# Patient Record
Sex: MALE | Race: WHITE | Employment: FULL TIME | URBAN - NONMETROPOLITAN AREA
[De-identification: names, ages, dates, MRNs, and addresses within clinical notes are randomized per-mention and may not be internally consistent; named-entity substitution may affect disease eponyms.]

---

## 2017-02-07 PROBLEM — M25.561 CHRONIC PAIN OF RIGHT KNEE: Status: ACTIVE | Noted: 2017-02-07

## 2017-02-07 PROBLEM — G89.29 CHRONIC PAIN OF RIGHT KNEE: Status: ACTIVE | Noted: 2017-02-07

## 2019-01-11 LAB
ALBUMIN SERPL-MCNC: NORMAL G/DL
ALP BLD-CCNC: NORMAL U/L
ALT SERPL-CCNC: NORMAL U/L
ANION GAP SERPL CALCULATED.3IONS-SCNC: NORMAL MMOL/L
AST SERPL-CCNC: NORMAL U/L
BILIRUB SERPL-MCNC: NORMAL MG/DL
BUN BLDV-MCNC: NORMAL MG/DL
CALCIUM SERPL-MCNC: NORMAL MG/DL
CHLORIDE BLD-SCNC: NORMAL MMOL/L
CHOLESTEROL, TOTAL: 258 MG/DL
CHOLESTEROL/HDL RATIO: 5
CO2: NORMAL
CREAT SERPL-MCNC: NORMAL MG/DL
GFR CALCULATED: NORMAL
GLUCOSE BLD-MCNC: NORMAL MG/DL
HDLC SERPL-MCNC: 52 MG/DL (ref 35–70)
LDL CHOLESTEROL CALCULATED: 180 MG/DL (ref 0–160)
POTASSIUM SERPL-SCNC: NORMAL MMOL/L
SODIUM BLD-SCNC: NORMAL MMOL/L
TOTAL PROTEIN: NORMAL
TRIGL SERPL-MCNC: 125 MG/DL
TSH SERPL DL<=0.05 MIU/L-ACNC: NORMAL M[IU]/L
VLDLC SERPL CALC-MCNC: ABNORMAL MG/DL

## 2019-03-19 ENCOUNTER — OFFICE VISIT (OUTPATIENT)
Dept: ORTHOPEDIC SURGERY | Age: 69
End: 2019-03-19
Payer: COMMERCIAL

## 2019-03-19 VITALS
HEART RATE: 73 BPM | DIASTOLIC BLOOD PRESSURE: 89 MMHG | HEIGHT: 65 IN | SYSTOLIC BLOOD PRESSURE: 155 MMHG | WEIGHT: 156 LBS | BODY MASS INDEX: 25.99 KG/M2

## 2019-03-19 DIAGNOSIS — G89.29 CHRONIC PAIN OF RIGHT KNEE: Primary | ICD-10-CM

## 2019-03-19 DIAGNOSIS — M25.561 CHRONIC PAIN OF RIGHT KNEE: Primary | ICD-10-CM

## 2019-03-19 PROCEDURE — 99213 OFFICE O/P EST LOW 20 MIN: CPT | Performed by: ORTHOPAEDIC SURGERY

## 2019-03-19 RX ORDER — IBUPROFEN 800 MG/1
TABLET ORAL
Refills: 0 | COMMUNITY
Start: 2019-02-28 | End: 2021-10-11

## 2019-03-19 RX ORDER — AMOXICILLIN 500 MG/1
CAPSULE ORAL
Refills: 0 | COMMUNITY
Start: 2019-01-02 | End: 2019-06-24 | Stop reason: ALTCHOICE

## 2019-04-23 ENCOUNTER — OFFICE VISIT (OUTPATIENT)
Dept: CARDIOLOGY CLINIC | Age: 69
End: 2019-04-23
Payer: COMMERCIAL

## 2019-04-23 VITALS
SYSTOLIC BLOOD PRESSURE: 128 MMHG | DIASTOLIC BLOOD PRESSURE: 80 MMHG | HEART RATE: 56 BPM | BODY MASS INDEX: 26.31 KG/M2 | HEIGHT: 65 IN | RESPIRATION RATE: 16 BRPM | WEIGHT: 157.9 LBS

## 2019-04-23 DIAGNOSIS — R00.2 PALPITATIONS: ICD-10-CM

## 2019-04-23 DIAGNOSIS — I34.1 MVP (MITRAL VALVE PROLAPSE): Primary | ICD-10-CM

## 2019-04-23 DIAGNOSIS — R42 DIZZINESS: ICD-10-CM

## 2019-04-23 PROCEDURE — 99214 OFFICE O/P EST MOD 30 MIN: CPT | Performed by: INTERNAL MEDICINE

## 2019-04-23 PROCEDURE — 93000 ELECTROCARDIOGRAM COMPLETE: CPT | Performed by: INTERNAL MEDICINE

## 2019-04-23 NOTE — PROGRESS NOTES
OFFICE FOLLOW-UP    Name: Michael Bauer     Age: 80 y.o. Date of Service: 4/23/2019    Chief Complaint: Follow-up for Mitral valve prolapse, Mitral regurgitation, S/P MV repair    Referring Physician: Dr. Tania Alatorre    Interim History:   Still with intermittent dizziness/vertigo. Otherwise with no new cardiac complaints since last office visit. Maintaining sinus rhythm. He has been off metoprolol for > 3 years due to prior history of hypotension. No chest pain or MOSHER -- he walks 2-3 miles/day with no difficulties.     Review of Systems:   Cardiac: As per HPI  General: No fever, chills  Pulmonary: As per HPI  HEENT: No visual disturbances, difficult swallowing  GI: No nausea, vomiting, abdominal pain  Endocrine: No thyroid disease or diabetes  Musculoskeletal: HAWTHORNE x 4, no arthritis  Skin: Intact, no rashes  Neuro/Psych: No headache or seizures    Past Medical History:  Past Medical History:   Diagnosis Date    Acid reflux     GERD (gastroesophageal reflux disease)        Past Surgical History:  Past Surgical History:   Procedure Laterality Date    COLONOSCOPY      MITRAL VALVE REPLACEMENT  9/17/14    RECTAL SURGERY      TUMOR REMOVAL      lipoma on chest       Family History:  Family History   Problem Relation Age of Onset    Heart Disease Mother     Heart Disease Father     Other Brother     Other Sister        Social History:  Social History     Socioeconomic History    Marital status:      Spouse name: Not on file    Number of children: Not on file    Years of education: Not on file    Highest education level: Not on file   Occupational History    Not on file   Social Needs    Financial resource strain: Not on file    Food insecurity:     Worry: Not on file     Inability: Not on file    Transportation needs:     Medical: Not on file     Non-medical: Not on file   Tobacco Use    Smoking status: Never Smoker    Smokeless tobacco: Former User     Types: Chew    Tobacco comment: snuff user daily Substance and Sexual Activity    Alcohol use: Yes     Comment: 5-6 beers a week    Drug use: No    Sexual activity: Not on file   Lifestyle    Physical activity:     Days per week: Not on file     Minutes per session: Not on file    Stress: Not on file   Relationships    Social connections:     Talks on phone: Not on file     Gets together: Not on file     Attends Scientologist service: Not on file     Active member of club or organization: Not on file     Attends meetings of clubs or organizations: Not on file     Relationship status: Not on file    Intimate partner violence:     Fear of current or ex partner: Not on file     Emotionally abused: Not on file     Physically abused: Not on file     Forced sexual activity: Not on file   Other Topics Concern    Not on file   Social History Narrative    Not on file       Allergies:  No Known Allergies    Current Medications:  Current Outpatient Medications   Medication Sig Dispense Refill    ibuprofen (ADVIL;MOTRIN) 800 MG tablet take 1 tablet by mouth every 8 hours if needed for pain  0    amoxicillin (AMOXIL) 500 MG capsule take 1 capsule by mouth three times a day for 7 days and 4 capsul. ..  (REFER TO PRESCRIPTION NOTES). 0    meclizine (ANTIVERT) 25 MG tablet       omeprazole (PRILOSEC) 20 MG capsule Take 20 mg by mouth every other day       sennosides-docusate sodium (SENOKOT-S) 8.6-50 MG tablet Take 1 tablet by mouth as needed       Naproxen Sodium (ALEVE) 220 MG CAPS Take 2 capsules by mouth as needed for Pain.  meloxicam (MOBIC) 15 MG tablet 15 mg daily  0    aspirin 81 MG chewable tablet Take 1 tablet by mouth daily. 30 tablet 3     No current facility-administered medications for this visit.       Physical Exam:  /80   Pulse 56   Resp 16   Ht 5' 5\" (1.651 m)   Wt 157 lb 14.4 oz (71.6 kg)   BMI 26.28 kg/m²   Wt Readings from Last 3 Encounters:   04/23/19 157 lb 14.4 oz (71.6 kg)   03/19/19 156 lb (70.8 kg)   10/24/17 162 lb (73.5 kg)     Appearance: Awake, no distress  Skin: Intact, no rash  Head: Normocephalic, atraumatic  Eyes: EOMI, no conjunctival erythema  ENMT: MMM, no rhinorrhea  Neck: Supple, no carotid bruits  Lungs: CTA B/L, no wheezing or rales, no distress  Cardiac: Regular rate and rhythm, +S1S2, no murmurs  Abdomen: Soft, nontender, +bowel sounds  Extremities: Moves all extremities x 4, no lower extremity edema  Neurologic: No focal motor deficits apparent, normal mood and affect    Cardiac Tests:  Echocardiogram: 7/3/14  Normal LV size and function  EF 65%  Normal RV function  Mitral valve prolapse (posterior leaflet) with severe anterior directed mitral regurgitation (eccentric jet)  Mild AI  Stage 3 DD    VOLODYMYR: 8/19/14  Normal left ventricular systolic function. Ejection fraction is visually estimated at 65%. Normal right ventricular size and function. No evidence of a left atrial appendage thrombus. No evidence of intra-atrial shunting on bubble study. Severe prolapse of the P2 segment of the posterior mitral leaflet with severe anteriorly-directed mitral regurgitation (eccentric jet). Trace-mild aortic regurgitation. Echocardiogram: 5/7/15  Normal LV size  Normal LV function, EF 60%  Normal RV size and function  S/P mitral valve repair  Mean MV gradient 2.3 mmHg, trace MR  Mild AI  MIld TR  Normal estimated PASP    EKG: SB, rate 56, NSSTT changes    ASSESSMENT / PLAN:  1. Mitral valve prolapse (P2 segment of posterior leaflet) with severe MR --> s/p minimally invasive MV repair on 9/17/14 (normal functioning valve on 5/2015 echocardiogram)  2. History of stage 3 diastolic dysfunction on 2/9293 echocardiogram  3. History of palpitations -- historically in sinus rhythm, he has been off metoprolol for > 3 years due to prior history of hypotension (resolved)  4. Post-op RLE ischemia --> s/p R femoral artery thrombectomy and saphenous vein patch repair on 9/18/14  5.  Long-standing history of dizziness/vertigo --> work-up ongoing per PCP  6.  Normal coronaries on 8/19/14 cardiac catheterization    - 30 day CardioNet ordered today  - Continue current medications  - Serial echocardiograms  - Endocarditis prophylaxis    Michael Mclaughlin MD  [facility]

## 2019-05-02 ENCOUNTER — TELEPHONE (OUTPATIENT)
Dept: CARDIOLOGY CLINIC | Age: 69
End: 2019-05-02

## 2019-05-02 NOTE — TELEPHONE ENCOUNTER
Received a Cardionet strip noting sinus tachycardia 140 bpm on 4/27/19 12:48:27 (scanned to chart). I spoke with patient. He states he takes his dogs out for walks mid day. Denies any problems at this time. He did request to only wear the monitor for 15 days instead of 30. 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 He is afraid that the monitor will get damaged or hinder him working. Please advise.

## 2019-05-10 ENCOUNTER — OFFICE VISIT (OUTPATIENT)
Dept: PODIATRY | Age: 69
End: 2019-05-10
Payer: COMMERCIAL

## 2019-05-10 VITALS
BODY MASS INDEX: 25.86 KG/M2 | WEIGHT: 155.2 LBS | HEIGHT: 65 IN | SYSTOLIC BLOOD PRESSURE: 138 MMHG | DIASTOLIC BLOOD PRESSURE: 80 MMHG

## 2019-05-10 DIAGNOSIS — M79.671 PAIN OF RIGHT FOOT: ICD-10-CM

## 2019-05-10 DIAGNOSIS — R26.2 DIFFICULTY WALKING: ICD-10-CM

## 2019-05-10 DIAGNOSIS — M72.2 PLANTAR FASCIITIS: Primary | ICD-10-CM

## 2019-05-10 PROCEDURE — 99213 OFFICE O/P EST LOW 20 MIN: CPT | Performed by: PODIATRIST

## 2019-05-10 PROCEDURE — MISCD314: Performed by: PODIATRIST

## 2019-05-10 NOTE — PROGRESS NOTES
Patient in today for 2 week follow up evaluation of right foot and ankle pain. Patient states the pain has decreased since his last visit and only has discomfort during high activity times.  pcp Angel Cornell, 
paresthesias are noted along percussion tarsal tunnel region right foot. DERMATOLOGICAL: Edematous issues and ecchymotic skin changes are resolved right lower extremity. MUSCULOSKELETAL: Increased range of motion of the right ankle, subtalar joint, and MTPJ areas noted right lower extremity. No tenderness noted into the plantar fascial heel and arch region right lower extremity. Diagnostic Studies:     No results found. No results found. Procedures:    None    Plan Per Assessment  Lennie Nunez was seen today for foot pain and ankle pain. Diagnoses and all orders for this visit:    Plantar fasciitis    Pain of right foot    Difficulty walking      1. Evaluation and management  2. We did discuss additional treatment options with the patient in detail today. We did recommend continued stretching and strengthening exercises on a daily basis. We also did recommend OTC insoles to be utilized with his existing shoe gear to prevent reoccurrence. 3. The patient was dispensed a pair of Powerstep insoles. They are medically necessary and within the standard of care for the patient's diagnosis. Their purpose is to mechanically control the heel and arch to decrease the stress and strain associated with the patient's biomechanical fault. The patient was instructed on the proper fit and break-in period. The patient was also instructed to watch for areas of rubbing, irritation, blister formation, or any other signs of abnormal pressure. If this occurs, the patient is to contact the office immediately. 4. Patient will be followed up at a later date for continued evaluation and management if any further issues recur. Seen By:    Perlita Garcia DPM    Electronically signed by Perlita Garcia DPM on 5/10/2019 at 9:53 AM    This note was created using voice recognition software. The note was reviewed however may contain grammatical errors.

## 2019-05-28 DIAGNOSIS — R42 DIZZINESS: ICD-10-CM

## 2019-05-28 DIAGNOSIS — R00.2 PALPITATIONS: ICD-10-CM

## 2019-06-17 ENCOUNTER — OFFICE VISIT (OUTPATIENT)
Dept: PODIATRY | Age: 69
End: 2019-06-17
Payer: COMMERCIAL

## 2019-06-17 VITALS — HEIGHT: 65 IN | BODY MASS INDEX: 25.83 KG/M2 | SYSTOLIC BLOOD PRESSURE: 122 MMHG | DIASTOLIC BLOOD PRESSURE: 84 MMHG

## 2019-06-17 DIAGNOSIS — M25.572 LEFT ANKLE PAIN, UNSPECIFIED CHRONICITY: ICD-10-CM

## 2019-06-17 DIAGNOSIS — R60.0 LOCALIZED EDEMA: ICD-10-CM

## 2019-06-17 DIAGNOSIS — S93.402A SPRAIN OF LIGAMENT OF LEFT ANKLE, INITIAL ENCOUNTER: Primary | ICD-10-CM

## 2019-06-17 DIAGNOSIS — R26.2 DIFFICULTY WALKING: ICD-10-CM

## 2019-06-17 PROCEDURE — 99213 OFFICE O/P EST LOW 20 MIN: CPT | Performed by: PODIATRIST

## 2019-06-17 PROCEDURE — 29580 STRAPPING UNNA BOOT: CPT | Performed by: PODIATRIST

## 2019-06-17 NOTE — PROGRESS NOTES
Patient in today for evaluation of possible left ankle sprain. Patient states he fell and twisted it Saturday and the pain has been increasing since the injury occurred.  pcp is Aurelio Tee DO last ov 4-9-19

## 2019-06-18 NOTE — PROGRESS NOTES
19     Ranell Bending    : 1950   Sex: male    Age: 77 y.o. Patient's PCP/Provider is: Barb Khoury DO    Subjective:  Patient is seen today for evaluation regarding sprain to the left ankle which occurred over the weekend. He denies any other complaints. Patient stepped in a hole and twisted his left ankle. Patient has not tried any treatments at this time. Chief Complaint   Patient presents with    Ankle Injury     left ankle       ROS:  Const: Positives and pertinent negatives as per HPI. Musculo: Denies symptoms other than stated above. Neuro: Denies symptoms other than stated above. Skin: Denies symptoms other than stated above. Current Medications:    Current Outpatient Medications:     ibuprofen (ADVIL;MOTRIN) 800 MG tablet, take 1 tablet by mouth every 8 hours if needed for pain, Disp: , Rfl: 0    amoxicillin (AMOXIL) 500 MG capsule, take 1 capsule by mouth three times a day for 7 days and 4 capsul. ..  (REFER TO PRESCRIPTION NOTES). , Disp: , Rfl: 0    meclizine (ANTIVERT) 25 MG tablet, , Disp: , Rfl:     meloxicam (MOBIC) 15 MG tablet, 15 mg daily, Disp: , Rfl: 0    aspirin 81 MG chewable tablet, Take 1 tablet by mouth daily. , Disp: 30 tablet, Rfl: 3    omeprazole (PRILOSEC) 20 MG capsule, Take 20 mg by mouth every other day , Disp: , Rfl:     sennosides-docusate sodium (SENOKOT-S) 8.6-50 MG tablet, Take 1 tablet by mouth as needed , Disp: , Rfl:     Naproxen Sodium (ALEVE) 220 MG CAPS, Take 2 capsules by mouth as needed for Pain., Disp: , Rfl:     Allergies:  No Known Allergies    Vitals:    19 1621   BP: 122/84   Weight: Comment: patient unable to stand on scale-injury   Height: 5' 5\" (1.651 m)       Exam:  NVS intact. Localized edema noted to the lateral left ankle. No ecchymotic changes noted. No skin abrasions or signs of infection noted.           Diagnostic Studies:     Xr Ankle Left (min 3 Views)    Result Date: 2019  Imaging studies performed: 3 views left ankle evaluated. Reason: Left ankle pain Results: No acute fractures or dislocations noted. Mild degenerative changes noted to both the distal medial and lateral malleolar regions. Ankle mortise maintained left. Spurring noted to both the posterior/superior and plantar surfaces calcaneus left. Vladislav Kruger DPM Electronically signed by Vladislav Kruger DPM on 6/17/2019 at 4:54 PM         Procedures: An unna boot  compressive wrap was applied to the left lower extremity. It's purpose is to  decrease  the amount of edema present, and to allow proper healing of the soft tissues. The patient was instructed to keep the unna boot clean, dry and intact until the next follow up visit. The patient was instructed to look for signs of redness, irritation, blistering and pain. If these or any other symptoms were to develop, they were advised to contact the office immediately for reevaluation. Plan Per Assessment  Heena Hagan was seen today for ankle injury. Diagnoses and all orders for this visit:    Sprain of ligament of left ankle, initial encounter    Localized edema    Left ankle pain, unspecified chronicity  -     XR ANKLE LEFT (MIN 3 VIEWS); Future    Difficulty walking      1. Evaluation and management  2. Compression dressing applied left lower extremity as described above. 3. Patient was advised on limiting his daily activities. 4. He will be followed up in 1 week or sooner if needed. Seen By:    Vladislav Kruger DPM    Electronically signed by Vladislav Kruger DPM on 6/17/2019 at 9:12 PM    This note was created using voice recognition software. The note was reviewed however may contain grammatical errors.

## 2019-06-24 ENCOUNTER — OFFICE VISIT (OUTPATIENT)
Dept: PODIATRY | Age: 69
End: 2019-06-24
Payer: COMMERCIAL

## 2019-06-24 VITALS
SYSTOLIC BLOOD PRESSURE: 122 MMHG | WEIGHT: 158 LBS | HEIGHT: 65 IN | DIASTOLIC BLOOD PRESSURE: 72 MMHG | BODY MASS INDEX: 26.33 KG/M2

## 2019-06-24 DIAGNOSIS — R26.2 DIFFICULTY WALKING: ICD-10-CM

## 2019-06-24 DIAGNOSIS — S93.402A SPRAIN OF LIGAMENT OF LEFT ANKLE, INITIAL ENCOUNTER: Primary | ICD-10-CM

## 2019-06-24 DIAGNOSIS — R60.0 LOCALIZED EDEMA: ICD-10-CM

## 2019-06-24 PROCEDURE — L4350 ANKLE CONTROL ORTHO PRE OTS: HCPCS | Performed by: PODIATRIST

## 2019-06-24 PROCEDURE — 99213 OFFICE O/P EST LOW 20 MIN: CPT | Performed by: PODIATRIST

## 2019-06-24 NOTE — PROGRESS NOTES
Patient in today for 1 week evaluation of left ankle pain. Patient states the swelling has decreased and he can walk easier than last week but he is still experiencing discomfort.  pcp is Jean Jenkins DO  Last ov 6-6-09
active range of motion and muscle testing. Diagnostic Studies:     Xr Ankle Left (min 3 Views)    Result Date: 6/17/2019  Imaging studies performed: 3 views left ankle evaluated. Reason: Left ankle pain Results: No acute fractures or dislocations noted. Mild degenerative changes noted to both the distal medial and lateral malleolar regions. Ankle mortise maintained left. Spurring noted to both the posterior/superior and plantar surfaces calcaneus left. Giacomo Devries DPM Electronically signed by Giacomo Devries DPM on 6/17/2019 at 4:54 PM         Procedures:    None    Plan Per Assessment  Altagracia Hurley was seen today for follow-up. Diagnoses and all orders for this visit:    Sprain of ligament of left ankle, initial encounter    Localized edema    Difficulty walking      1. Evaluation and management  2. Discussed continued treatment options with the patient in detail today. Did recommend the use of an ankle support to prevent further reoccurrence of symptoms. The patient was dispensed an ankle brace. It is medically necessary and within the standard of care for the patient's diagnosis. Its purpose is to stabilize the ankle; thus allowing the inflammation and pain to subside. It will also allow the patient to remain ambulatory but at the same time controlling the motion at the ankle subtalar joints. The patient was instructed on its proper application and use. The patient was was also instructed to watch for areas of rubbing irritation, blister formation, or any other signs of abnormal pressure. If this occurs, the patient is to contact the office immediately. The ABN was reviewed and signed by the patient prior to leaving this appointment. 3. Patient was advised on appropriate shoe gear to utilize to give him added support with his daily activities. 4. Patient will be followed up in 1 month's time or sooner if needed for reevaluation.       Seen By:    Giacomo Devries

## 2019-07-24 ENCOUNTER — OFFICE VISIT (OUTPATIENT)
Dept: PODIATRY | Age: 69
End: 2019-07-24
Payer: COMMERCIAL

## 2019-07-24 VITALS
HEIGHT: 65 IN | BODY MASS INDEX: 26.66 KG/M2 | SYSTOLIC BLOOD PRESSURE: 108 MMHG | WEIGHT: 160 LBS | DIASTOLIC BLOOD PRESSURE: 70 MMHG

## 2019-07-24 DIAGNOSIS — R26.2 DIFFICULTY WALKING: ICD-10-CM

## 2019-07-24 DIAGNOSIS — S93.402D SPRAIN OF LIGAMENT OF LEFT ANKLE, SUBSEQUENT ENCOUNTER: Primary | ICD-10-CM

## 2019-07-24 PROBLEM — R60.0 LOCALIZED EDEMA: Status: RESOLVED | Noted: 2019-06-17 | Resolved: 2019-07-24

## 2019-07-24 PROCEDURE — 99213 OFFICE O/P EST LOW 20 MIN: CPT | Performed by: PODIATRIST

## 2019-07-24 NOTE — PROGRESS NOTES
Patient here for a 1 month follow up on L ankle sprain. He states his ankle is feeling better sometimes, but he is still having pain off and on. PCP Matteo MARI 01/23/19.

## 2019-08-07 RX ORDER — SILDENAFIL CITRATE 20 MG/1
20 TABLET ORAL PRN
COMMUNITY
End: 2020-06-11 | Stop reason: SDUPTHER

## 2019-08-07 RX ORDER — SENNA PLUS 8.6 MG/1
1 TABLET ORAL
COMMUNITY

## 2019-08-07 RX ORDER — SENNA PLUS 8.6 MG/1
TABLET ORAL
COMMUNITY
End: 2019-08-09

## 2019-08-07 RX ORDER — AMOXICILLIN 500 MG/1
500 CAPSULE ORAL
COMMUNITY
End: 2019-08-09

## 2019-08-09 ENCOUNTER — OFFICE VISIT (OUTPATIENT)
Dept: FAMILY MEDICINE CLINIC | Age: 69
End: 2019-08-09
Payer: COMMERCIAL

## 2019-08-09 VITALS
BODY MASS INDEX: 25.16 KG/M2 | TEMPERATURE: 97.6 F | SYSTOLIC BLOOD PRESSURE: 130 MMHG | OXYGEN SATURATION: 98 % | WEIGHT: 151 LBS | DIASTOLIC BLOOD PRESSURE: 80 MMHG | HEIGHT: 65 IN | HEART RATE: 58 BPM

## 2019-08-09 DIAGNOSIS — R63.4 WEIGHT LOSS: Primary | ICD-10-CM

## 2019-08-09 PROCEDURE — G0444 DEPRESSION SCREEN ANNUAL: HCPCS | Performed by: FAMILY MEDICINE

## 2019-08-09 PROCEDURE — 99213 OFFICE O/P EST LOW 20 MIN: CPT | Performed by: FAMILY MEDICINE

## 2019-08-09 ASSESSMENT — PATIENT HEALTH QUESTIONNAIRE - PHQ9
1. LITTLE INTEREST OR PLEASURE IN DOING THINGS: 3
4. FEELING TIRED OR HAVING LITTLE ENERGY: 2
6. FEELING BAD ABOUT YOURSELF - OR THAT YOU ARE A FAILURE OR HAVE LET YOURSELF OR YOUR FAMILY DOWN: 0
9. THOUGHTS THAT YOU WOULD BE BETTER OFF DEAD, OR OF HURTING YOURSELF: 0
SUM OF ALL RESPONSES TO PHQ QUESTIONS 1-9: 12
3. TROUBLE FALLING OR STAYING ASLEEP: 1
10. IF YOU CHECKED OFF ANY PROBLEMS, HOW DIFFICULT HAVE THESE PROBLEMS MADE IT FOR YOU TO DO YOUR WORK, TAKE CARE OF THINGS AT HOME, OR GET ALONG WITH OTHER PEOPLE: 0
8. MOVING OR SPEAKING SO SLOWLY THAT OTHER PEOPLE COULD HAVE NOTICED. OR THE OPPOSITE, BEING SO FIGETY OR RESTLESS THAT YOU HAVE BEEN MOVING AROUND A LOT MORE THAN USUAL: 3
5. POOR APPETITE OR OVEREATING: 3
7. TROUBLE CONCENTRATING ON THINGS, SUCH AS READING THE NEWSPAPER OR WATCHING TELEVISION: 0
SUM OF ALL RESPONSES TO PHQ QUESTIONS 1-9: 12
SUM OF ALL RESPONSES TO PHQ9 QUESTIONS 1 & 2: 3
2. FEELING DOWN, DEPRESSED OR HOPELESS: 0

## 2019-08-09 NOTE — PROGRESS NOTES
8/9/19    Chief Complaint   Patient presents with    Weight Loss        S: patient here with wife with history of 10 lb weight loss since last visit. Admits he has 4-5 beers at night and doesn't eat. Dentures are not right either. Family History   Problem Relation Age of Onset    Heart Disease Mother     Atrial Fibrillation Mother     Heart Disease Father     Heart Attack Father     Other Brother     Other Sister        Past Surgical History:   Procedure Laterality Date    COLONOSCOPY      MITRAL VALVE REPLACEMENT  9/17/14    RECTAL SURGERY      TUMOR REMOVAL      lipoma on chest       Social History     Tobacco Use    Smoking status: Never Smoker    Smokeless tobacco: Former User     Types: Chew    Tobacco comment: snuff user daily   Substance Use Topics    Alcohol use: Yes     Comment: 5-6 beers a week    Drug use: No       ROS:  No CP, No palpitations,   No sob, No cough,   No abd pain, No heartburn,   No headaches,   No tingling, No numbness, No weakness,   No bowel changes, No hematochezia, No melena,  No bladder changes, No hematuria  No skin rashes, No skin lesions. No vision changes, No hearing changes,   No polyuria, polydipsia, polyphagia. Stable mood. ROS otherwise negative unless as listed in HPI. Chart reviewed and updated where appropriate for PMH, Fam, and Soc Hx. Physical Exam   /80   Pulse 58   Temp 97.6 °F (36.4 °C)   Ht 5' 5\" (1.651 m)   Wt 151 lb (68.5 kg)   SpO2 98%   BMI 25.13 kg/m²   Wt Readings from Last 3 Encounters:   08/09/19 151 lb (68.5 kg)   07/24/19 160 lb (72.6 kg)   06/24/19 158 lb (71.7 kg)       Constitutional:    He is oriented to person, place, and time. He appears well-developed and well-nourished.    HENT:    Right Ear: Tympanic membrane, external ear and ear canal normal.    Left Ear: Tympanic membrane, external ear and ear canal normal.    Nose: Nose normal.    Mouth/Throat: Oropharynx is clear and moist.   Eyes:    Conjunctivae are for this visit:    Weight loss    Reviewed PMhx, diet, meds, exercise, beer!! Dc beer and eat supper, discussed supplements. Recheck one month. No follow-ups on file. Patient counseled to follow up sooner or seek more acute care if symptoms worsening. Electronically signed by Myriam Han DO on 8/9/2019    This note may have been created using dictation software.  Efforts were made to reduce grammatical or syntax errors, but some may persist.

## 2019-09-30 ENCOUNTER — OFFICE VISIT (OUTPATIENT)
Dept: FAMILY MEDICINE CLINIC | Age: 69
End: 2019-09-30
Payer: COMMERCIAL

## 2019-09-30 VITALS
BODY MASS INDEX: 24.68 KG/M2 | TEMPERATURE: 98.5 F | SYSTOLIC BLOOD PRESSURE: 116 MMHG | DIASTOLIC BLOOD PRESSURE: 76 MMHG | HEIGHT: 65 IN | HEART RATE: 60 BPM | WEIGHT: 148.13 LBS | OXYGEN SATURATION: 99 %

## 2019-09-30 DIAGNOSIS — Z00.00 ROUTINE GENERAL MEDICAL EXAMINATION AT A HEALTH CARE FACILITY: Primary | ICD-10-CM

## 2019-09-30 PROCEDURE — G0439 PPPS, SUBSEQ VISIT: HCPCS | Performed by: PHYSICIAN ASSISTANT

## 2019-09-30 ASSESSMENT — LIFESTYLE VARIABLES
HOW OFTEN DO YOU HAVE A DRINK CONTAINING ALCOHOL: 4
AUDIT-C TOTAL SCORE: 5
HOW MANY STANDARD DRINKS CONTAINING ALCOHOL DO YOU HAVE ON A TYPICAL DAY: 1
HOW OFTEN DO YOU HAVE SIX OR MORE DRINKS ON ONE OCCASION: 0

## 2019-09-30 ASSESSMENT — PATIENT HEALTH QUESTIONNAIRE - PHQ9
SUM OF ALL RESPONSES TO PHQ9 QUESTIONS 1 & 2: 0
SUM OF ALL RESPONSES TO PHQ QUESTIONS 1-9: 0
SUM OF ALL RESPONSES TO PHQ QUESTIONS 1-9: 0
2. FEELING DOWN, DEPRESSED OR HOPELESS: 0
SUM OF ALL RESPONSES TO PHQ QUESTIONS 1-9: 0
1. LITTLE INTEREST OR PLEASURE IN DOING THINGS: 0
SUM OF ALL RESPONSES TO PHQ QUESTIONS 1-9: 0

## 2019-09-30 NOTE — PROGRESS NOTES
driving, watching TV, or doing any of your daily activities because of your eyesight?: No  Have you had an eye exam within the past year?: Yes  Hearing/Vision Interventions:  · Hearing concerns:  patient declines any further evaluation/treatment for hearing issues, hHe has been evaluated by Audiology    Safety:  Safety  Do you have working smoke detectors?: Yes  Have all throw rugs been removed or fastened?: (!) No  Do you have non-slip mats or surfaces in all bathtubs/showers?: Yes  Do all of your stairways have a railing or banister?: Yes  Are your doorways, halls and stairs free of clutter?: Yes  Do you always fasten your seatbelt when you are in a car?: Yes  Safety Interventions:  · Home safety tips provided    Personalized Preventive Plan   Current Health Maintenance Status  Immunization History   Administered Date(s) Administered    Influenza Vaccine, unspecified formulation 2014    Influenza Virus Vaccine 2014, 2017, 2018    Influenza, Triv, inactivated, subunit, adjuvanted, IM (Fluad 65 yrs and older) 2018    Pneumococcal Conjugate 13-valent (Danelle Nan) 2019        Health Maintenance   Topic Date Due    Hepatitis C screen  1950    DTaP/Tdap/Td vaccine (1 - Tdap) 1970    Shingles Vaccine (1 of 2) 2001    Colon cancer screen colonoscopy  2018    Annual Wellness Visit (AWV)  2019    Flu vaccine (1) 2019    Pneumococcal 65+ years Vaccine (2 of 2 - PPSV23) 2020    Lipid screen  2024     Recommendations for Preventive Services Due: see orders and patient instructions/AVS.  . Recommended screening schedule for the next 5-10 years is provided to the patient in written form: see Patient Instructions/AVS.    There are no diagnoses linked to this encounter. Medicare Annual Wellness Visit  Name: Luis Duggan Date: 2019   MRN: [id number] Sex: Male   Age: 77 y.o.  Ethnicity: Non-/Non    :

## 2019-09-30 NOTE — PATIENT INSTRUCTIONS
decide for yourself. The person you choose is called your health care agent. Another type of advance directive is a living will. It lets you write down what kinds of treatment or life support you want or do not want. What should you think about when choosing a health care agent? Choose your health care agent carefully. This person may or may not be a family member. Talk to the person before you make your final decision. Make sure he or she is comfortable with this responsibility. It's a good idea to choose someone who:  Is at least 25years old. Knows you well and understands what makes life meaningful for you. Understands your Holiness and moral values. Will do what you want, not what he or she wants. Will be able to make difficult choices at a stressful time. Will be able to refuse or stop treatment, if that is what you would want, even if you could die. Will be firm and confident with health professionals if needed. Will ask questions to get necessary information. Lives near you or agrees to travel to you if needed. Your family may help you make medical decisions while you can still be part of that process. But it is important to choose one person to be your health care agent in case you are not able to make decisions for yourself. If you don't fill out the legal form and name a health care agent, the decisions your family can make may be limited. Who will make decisions for you if you do not have a health care agent? If you don't have a health care agent or a living will, your family members may disagree about your medical care. And then some medical professionals who may not know you as well might have to make decisions for you. In some cases, a  makes the decisions. When you name a health care agent, it is very clear who has the power to make health decisions for you. How do you name a health care agent? You name your health care agent on a legal form.  It is usually called a durable

## 2019-12-12 ENCOUNTER — OFFICE VISIT (OUTPATIENT)
Dept: FAMILY MEDICINE CLINIC | Age: 69
End: 2019-12-12
Payer: COMMERCIAL

## 2019-12-12 VITALS
WEIGHT: 152.6 LBS | OXYGEN SATURATION: 99 % | SYSTOLIC BLOOD PRESSURE: 140 MMHG | HEART RATE: 76 BPM | BODY MASS INDEX: 25.43 KG/M2 | HEIGHT: 65 IN | DIASTOLIC BLOOD PRESSURE: 86 MMHG | TEMPERATURE: 97.2 F

## 2019-12-12 DIAGNOSIS — M70.21 OLECRANON BURSITIS OF RIGHT ELBOW: ICD-10-CM

## 2019-12-12 DIAGNOSIS — R42 VERTIGO: Primary | ICD-10-CM

## 2019-12-12 PROCEDURE — 99213 OFFICE O/P EST LOW 20 MIN: CPT | Performed by: FAMILY MEDICINE

## 2019-12-12 RX ORDER — NAPROXEN 500 MG/1
500 TABLET ORAL 2 TIMES DAILY WITH MEALS
Qty: 20 TABLET | Refills: 1 | Status: SHIPPED
Start: 2019-12-12 | End: 2022-06-17 | Stop reason: SDUPTHER

## 2020-01-03 ENCOUNTER — OFFICE VISIT (OUTPATIENT)
Dept: FAMILY MEDICINE CLINIC | Age: 70
End: 2020-01-03
Payer: MEDICARE

## 2020-01-03 VITALS
TEMPERATURE: 97.8 F | DIASTOLIC BLOOD PRESSURE: 82 MMHG | HEART RATE: 59 BPM | SYSTOLIC BLOOD PRESSURE: 140 MMHG | HEIGHT: 65 IN | BODY MASS INDEX: 26.12 KG/M2 | WEIGHT: 156.8 LBS | OXYGEN SATURATION: 99 %

## 2020-01-03 PROCEDURE — G8510 SCR DEP NEG, NO PLAN REQD: HCPCS | Performed by: FAMILY MEDICINE

## 2020-01-03 PROCEDURE — 99213 OFFICE O/P EST LOW 20 MIN: CPT | Performed by: FAMILY MEDICINE

## 2020-01-03 ASSESSMENT — PATIENT HEALTH QUESTIONNAIRE - PHQ9
SUM OF ALL RESPONSES TO PHQ QUESTIONS 1-9: 0
1. LITTLE INTEREST OR PLEASURE IN DOING THINGS: 0
SUM OF ALL RESPONSES TO PHQ QUESTIONS 1-9: 0
2. FEELING DOWN, DEPRESSED OR HOPELESS: 0
SUM OF ALL RESPONSES TO PHQ9 QUESTIONS 1 & 2: 0

## 2020-01-03 NOTE — PROGRESS NOTES
Mouth/Throat: Oropharynx is clear and moist.   Eyes:    Conjunctivae are normal.    Pupils are equal, round, and reactive to light. EOMI. Neck:    Normal range of motion. No thyromegaly or nodules noted. No bruit. Cardiovascular:    Normal rate, regular rhythm and normal heart sounds. No murmur. No gallop and no friction rub. Pulmonary/Chest:    Effort normal and breath sounds normal.    No wheezes. No rales or rhonchi. Abdominal:    Soft. Bowel sounds are normal.    No distension. No tenderness. Musculoskeletal: Right olecranon bursa with effusion. No erythema. Normal range of motion. No joint swelling noted. No peripheral edema. Neurological:    He is alert and oriented to person, place, and time. Motor and sensation grossly intact. Normal Gait. Skin:    Skin is warm and dry. No rashes, lesions. Psychiatric:    He has a normal mood and affect. Normal groom and dress. Current Outpatient Medications on File Prior to Visit   Medication Sig Dispense Refill    naproxen (NAPROSYN) 500 MG tablet Take 1 tablet by mouth 2 times daily (with meals) 20 tablet 1    sildenafil (REVATIO) 20 MG tablet Take 20 mg by mouth as needed      senna (SENOKOT) 8.6 MG tablet Take 1 tablet by mouth      ibuprofen (ADVIL;MOTRIN) 800 MG tablet take 1 tablet by mouth every 8 hours if needed for pain  0    omeprazole (PRILOSEC) 20 MG capsule Take 20 mg by mouth every other day       Naproxen Sodium (ALEVE) 220 MG CAPS Take 2 capsules by mouth as needed for Pain.  meclizine (ANTIVERT) 25 MG tablet        No current facility-administered medications on file prior to visit.         Patient Active Problem List   Diagnosis Code    MVP (mitral valve prolapse) I34.1    Mitral regurgitation I34.0    S/P MVR (mitral valve repair) H32.403    Diastolic dysfunction I54.40    Lower limb ischemia I99.8    Palpitations R00.2    Chronic pain of right knee M25.561, G89.29    Plantar

## 2020-01-08 ENCOUNTER — TELEPHONE (OUTPATIENT)
Dept: FAMILY MEDICINE CLINIC | Age: 70
End: 2020-01-08

## 2020-01-08 NOTE — TELEPHONE ENCOUNTER
Patient calling in. He has been unable to reach . He is wondering if he can have the referral switched to [address] [place], [place] [address] in [place]?   Please advise, thank you

## 2020-06-11 ENCOUNTER — HOSPITAL ENCOUNTER (OUTPATIENT)
Age: 70
Discharge: HOME | End: 2020-06-13
Payer: MEDICARE

## 2020-06-11 ENCOUNTER — OFFICE VISIT (OUTPATIENT)
Dept: PRIMARY CARE CLINIC | Age: 70
End: 2020-06-11
Payer: MEDICARE

## 2020-06-11 VITALS
SYSTOLIC BLOOD PRESSURE: 120 MMHG | DIASTOLIC BLOOD PRESSURE: 80 MMHG | OXYGEN SATURATION: 98 % | WEIGHT: 170 LBS | HEIGHT: 65 IN | TEMPERATURE: 98.5 F | HEART RATE: 81 BPM | BODY MASS INDEX: 28.32 KG/M2

## 2020-06-11 PROBLEM — S93.402A SPRAIN OF LIGAMENT OF LEFT ANKLE: Status: RESOLVED | Noted: 2019-06-17 | Resolved: 2020-06-11

## 2020-06-11 LAB
ALBUMIN SERPL-MCNC: 4.4 G/DL (ref 3.5–5.2)
ALP BLD-CCNC: 64 U/L (ref 40–129)
ALT SERPL-CCNC: 19 U/L (ref 0–40)
ANION GAP SERPL CALCULATED.3IONS-SCNC: 12 MMOL/L (ref 7–16)
AST SERPL-CCNC: 23 U/L (ref 0–39)
BASOPHILS ABSOLUTE: 0.03 E9/L (ref 0–0.2)
BASOPHILS RELATIVE PERCENT: 0.6 % (ref 0–2)
BILIRUB SERPL-MCNC: 0.3 MG/DL (ref 0–1.2)
BUN BLDV-MCNC: 19 MG/DL (ref 8–23)
CALCIUM SERPL-MCNC: 9.8 MG/DL (ref 8.6–10.2)
CHLORIDE BLD-SCNC: 103 MMOL/L (ref 98–107)
CHOLESTEROL, TOTAL: 255 MG/DL (ref 0–199)
CO2: 23 MMOL/L (ref 22–29)
CREAT SERPL-MCNC: 1.3 MG/DL (ref 0.7–1.2)
CREATININE URINE: 164 MG/DL (ref 40–278)
EOSINOPHILS ABSOLUTE: 0.11 E9/L (ref 0.05–0.5)
EOSINOPHILS RELATIVE PERCENT: 2.1 % (ref 0–6)
FERRITIN: 307 NG/ML
FOLATE: >20 NG/ML (ref 4.8–24.2)
GFR AFRICAN AMERICAN: >60
GFR NON-AFRICAN AMERICAN: 55 ML/MIN/1.73
GLUCOSE BLD-MCNC: 101 MG/DL (ref 74–99)
HBA1C MFR BLD: 5.3 % (ref 4–5.6)
HCT VFR BLD CALC: 45.6 % (ref 37–54)
HDLC SERPL-MCNC: 61 MG/DL
HEMOGLOBIN: 14.5 G/DL (ref 12.5–16.5)
IMMATURE GRANULOCYTES #: 0.01 E9/L
IMMATURE GRANULOCYTES %: 0.2 % (ref 0–5)
IRON SATURATION: 42 % (ref 20–55)
IRON: 119 MCG/DL (ref 59–158)
LDL CHOLESTEROL CALCULATED: 169 MG/DL (ref 0–99)
LYMPHOCYTES ABSOLUTE: 1.12 E9/L (ref 1.5–4)
LYMPHOCYTES RELATIVE PERCENT: 21.8 % (ref 20–42)
MCH RBC QN AUTO: 30.4 PG (ref 26–35)
MCHC RBC AUTO-ENTMCNC: 31.8 % (ref 32–34.5)
MCV RBC AUTO: 95.6 FL (ref 80–99.9)
MICROALBUMIN UR-MCNC: 27 MG/L
MICROALBUMIN/CREAT UR-RTO: 16.5 (ref 0–30)
MONOCYTES ABSOLUTE: 0.44 E9/L (ref 0.1–0.95)
MONOCYTES RELATIVE PERCENT: 8.6 % (ref 2–12)
NEUTROPHILS ABSOLUTE: 3.43 E9/L (ref 1.8–7.3)
NEUTROPHILS RELATIVE PERCENT: 66.7 % (ref 43–80)
PDW BLD-RTO: 12.5 FL (ref 11.5–15)
PLATELET # BLD: 320 E9/L (ref 130–450)
PMV BLD AUTO: 10.7 FL (ref 7–12)
POTASSIUM SERPL-SCNC: 4.7 MMOL/L (ref 3.5–5)
PROSTATE SPECIFIC ANTIGEN: 1.91 NG/ML (ref 0–4)
RBC # BLD: 4.77 E12/L (ref 3.8–5.8)
SODIUM BLD-SCNC: 138 MMOL/L (ref 132–146)
T4 FREE: 0.96 NG/DL (ref 0.93–1.7)
TOTAL IRON BINDING CAPACITY: 285 MCG/DL (ref 250–450)
TOTAL PROTEIN: 7.4 G/DL (ref 6.4–8.3)
TRIGL SERPL-MCNC: 125 MG/DL (ref 0–149)
TSH SERPL DL<=0.05 MIU/L-ACNC: 2.98 UIU/ML (ref 0.27–4.2)
VITAMIN B-12: 356 PG/ML (ref 211–946)
VITAMIN D 25-HYDROXY: 19 NG/ML (ref 30–100)
VLDLC SERPL CALC-MCNC: 25 MG/DL
WBC # BLD: 5.1 E9/L (ref 4.5–11.5)

## 2020-06-11 PROCEDURE — G0103 PSA SCREENING: HCPCS

## 2020-06-11 PROCEDURE — 82746 ASSAY OF FOLIC ACID SERUM: CPT

## 2020-06-11 PROCEDURE — 84439 ASSAY OF FREE THYROXINE: CPT

## 2020-06-11 PROCEDURE — 84443 ASSAY THYROID STIM HORMONE: CPT

## 2020-06-11 PROCEDURE — 83550 IRON BINDING TEST: CPT

## 2020-06-11 PROCEDURE — 82306 VITAMIN D 25 HYDROXY: CPT

## 2020-06-11 PROCEDURE — 80061 LIPID PANEL: CPT

## 2020-06-11 PROCEDURE — 85025 COMPLETE CBC W/AUTO DIFF WBC: CPT

## 2020-06-11 PROCEDURE — 99214 OFFICE O/P EST MOD 30 MIN: CPT | Performed by: FAMILY MEDICINE

## 2020-06-11 PROCEDURE — 83540 ASSAY OF IRON: CPT

## 2020-06-11 PROCEDURE — 82570 ASSAY OF URINE CREATININE: CPT

## 2020-06-11 PROCEDURE — 83036 HEMOGLOBIN GLYCOSYLATED A1C: CPT

## 2020-06-11 PROCEDURE — 82607 VITAMIN B-12: CPT

## 2020-06-11 PROCEDURE — 82044 UR ALBUMIN SEMIQUANTITATIVE: CPT

## 2020-06-11 PROCEDURE — 80053 COMPREHEN METABOLIC PANEL: CPT

## 2020-06-11 PROCEDURE — 36415 COLL VENOUS BLD VENIPUNCTURE: CPT

## 2020-06-11 PROCEDURE — 82728 ASSAY OF FERRITIN: CPT

## 2020-06-11 RX ORDER — SILDENAFIL CITRATE 20 MG/1
20 TABLET ORAL PRN
Qty: 30 TABLET | Refills: 5 | Status: SHIPPED
Start: 2020-06-11 | End: 2020-12-11 | Stop reason: SDUPTHER

## 2020-06-11 ASSESSMENT — ENCOUNTER SYMPTOMS
DIARRHEA: 0
COUGH: 0
CONSTIPATION: 0
VOMITING: 0
BACK PAIN: 0
ABDOMINAL PAIN: 0
SHORTNESS OF BREATH: 0
NAUSEA: 0
WHEEZING: 0

## 2020-06-11 NOTE — PROGRESS NOTES
20  [place] Apgar : 1950 Sex: male  Age: 72 y.o. Chief Complaint   Patient presents with    Medication Refill     HPI:  72 y.o. male patient of Dr. Ryne Noonan presents today for 6 month(s) follow up of chronic medical conditions and FBW. Patient's chart, medical, surgical and medication history all reviewed. Fatigue: Patient complains of fatigue. Symptoms began several months ago. Sentinal symptom the patient feels fatigue began with: none. Symptoms of his fatigue have been change in appetite, decreased libido, fatigue with paradoxical insomnia, general malaise and hypersomnolence. Patient describes the following psychologic symptoms: none. Patient denies fever, significant change in weight, symptoms of arthritis, exercise intolerance, unusual rashes, cold intolerance, constipation and change in hair texture. and witnessed or suspected sleep apnea. Symptoms have gradually worsened. Severity has been moderate. Previous visits for this problem: none. ROS:  Review of Systems   Constitutional: Positive for activity change, appetite change and fatigue. Negative for chills, fever and unexpected weight change. Respiratory: Negative for cough, shortness of breath and wheezing. Cardiovascular: Negative for chest pain and palpitations. Gastrointestinal: Negative for abdominal pain, constipation, diarrhea, nausea and vomiting. Musculoskeletal: Negative for arthralgias and back pain. Skin: Negative for rash. Neurological: Negative for dizziness and headaches. Psychiatric/Behavioral: Positive for sleep disturbance. Negative for dysphoric mood. The patient is not nervous/anxious. All other systems reviewed and are negative.      Current Outpatient Medications on File Prior to Visit   Medication Sig Dispense Refill    naproxen (NAPROSYN) 500 MG tablet Take 1 tablet by mouth 2 times daily (with meals) 20 tablet 1    senna (SENOKOT) 8.6 MG tablet Take 1 tablet by mouth      ibuprofen  Other Brother         Hemachromatosis     Other Sister         MS     Social History     Socioeconomic History    Marital status:      Spouse name: Not on file    Number of children: Not on file    Years of education: Not on file    Highest education level: Not on file   Occupational History    Occupation: [employer], mows grass,etc   Social Needs    Financial resource strain: Not on file    Food insecurity     Worry: Not on file     Inability: Not on file   [employer] needs     Medical: Not on file     Non-medical: Not on file   Tobacco Use    Smoking status: Never Smoker    Smokeless tobacco: Former User     Types: Chew    Tobacco comment: snuff user daily   Substance and Sexual Activity    Alcohol use: Yes     Comment: 5-6 beers a week    Drug use: No    Sexual activity: Not on file   Lifestyle    Physical activity     Days per week: Not on file     Minutes per session: Not on file    Stress: Not on file   Relationships    Social connections     Talks on phone: Not on file     Gets together: Not on file     Attends Church service: Not on file     Active member of club or organization: Not on file     Attends meetings of clubs or organizations: Not on file     Relationship status: Not on file    Intimate partner violence     Fear of current or ex partner: Not on file     Emotionally abused: Not on file     Physically abused: Not on file     Forced sexual activity: Not on file   Other Topics Concern    Not on file   Social History Narrative    Not on file       Vitals:    06/11/20 0815   BP: 120/80   Pulse: 81   Temp: 98.5 °F (36.9 °C)   SpO2: 98%   Weight: 170 lb (77.1 kg)   Height: 5' 4.5\" (1.638 m)       Physical Exam:  Physical Exam  Vitals signs and nursing note reviewed. Constitutional:       General: He is not in acute distress. Appearance: Normal appearance. He is well-developed and normal weight. He is not ill-appearing.    HENT:      Head: Normocephalic AST 25 09/15/2014    ALT 19 09/15/2014     Uric Acid:  No results found for: Sky Devries  HgBA1c:    Lab Results   Component Value Date    LABA1C 5.3 09/15/2014     Microalbumen/Creatinine ratio:  No components found for: RUCREAT  FLP:    Lab Results   Component Value Date    TRIG 125 01/11/2019    HDL 52 01/11/2019    LDLCALC 180 01/11/2019     TSH:  No results found for: TSH  VITAMIN B12: No components found for: B12  IRON:  No results found for: IRON  FERRITIN:  No results found for: FERRITIN       Assessment and Plan:  Luis Robles was seen today for medication refill. Diagnoses and all orders for this visit:    Other fatigue  -     TSH without Reflex; Future  -     Vitamin B12 & Folate; Future  -     T4, Free; Future  Will check labs to determine patient's cause for fatigue. Dizziness  Discussed with patient and wife that symptoms may be more related to sinuses. Needs to take Zyrtec and Flonase every day. Has had ENT work up. Vitamin D deficiency  -     Vitamin D 25 Hydroxy; Future    Vasculogenic erectile dysfunction, unspecified vasculogenic erectile dysfunction type  -     sildenafil (REVATIO) 20 MG tablet; Take 1 tablet by mouth as needed (ED)    Impaired fasting blood sugar  -     Hemoglobin A1C; Future    Anemia, unspecified type  -     CBC Auto Differential; Future  -     Iron and TIBC; Future  -     Ferritin; Future    Screening for cardiovascular condition  -     CBC Auto Differential; Future  -     Comprehensive Metabolic Panel; Future  -     Lipid Panel; Future  -     Microalbumin / Creatinine Urine Ratio; Future    Screening for prostate cancer  -     Psa screening; Future    At high risk for falls  On the basis of positive falls risk screening, assessment and plan is as follows: patient declines any further evaluation/treatment for increased falls risk. Return in about 6 months (around 12/11/2020) for Recheck.       Seen By:  Corona Webb DO

## 2020-06-12 ENCOUNTER — TELEPHONE (OUTPATIENT)
Dept: PRIMARY CARE CLINIC | Age: 70
End: 2020-06-12

## 2020-06-26 ENCOUNTER — TELEPHONE (OUTPATIENT)
Dept: FAMILY MEDICINE CLINIC | Age: 70
End: 2020-06-26

## 2020-06-26 RX ORDER — ERGOCALCIFEROL 1.25 MG/1
50000 CAPSULE ORAL WEEKLY
Qty: 12 CAPSULE | Refills: 1 | Status: SHIPPED
Start: 2020-06-26 | End: 2020-12-11 | Stop reason: SDUPTHER

## 2020-06-26 NOTE — TELEPHONE ENCOUNTER
Vito's labs all look stable. His kidney function is mildly reduced, but not significantly. We will monitor this. His cholesterol is improved. His vitamin D is low at 19. I will send a once a week prescription for him to take.      Please mail them a copy of labs

## 2020-12-01 ENCOUNTER — TELEPHONE (OUTPATIENT)
Dept: FAMILY MEDICINE CLINIC | Age: 70
End: 2020-12-01

## 2020-12-01 NOTE — TELEPHONE ENCOUNTER
Nanda Berger has an appointment with you on the 11th and asking for a lab order to be put in today or tomorrow.

## 2020-12-03 DIAGNOSIS — I10 ESSENTIAL HYPERTENSION: ICD-10-CM

## 2020-12-03 DIAGNOSIS — E55.9 VITAMIN D INSUFFICIENCY: ICD-10-CM

## 2020-12-03 DIAGNOSIS — R73.01 IMPAIRED FASTING GLUCOSE: ICD-10-CM

## 2020-12-03 DIAGNOSIS — D50.9 IRON DEFICIENCY ANEMIA, UNSPECIFIED IRON DEFICIENCY ANEMIA TYPE: ICD-10-CM

## 2020-12-03 LAB
ALBUMIN SERPL-MCNC: 3.9 G/DL (ref 3.5–5.2)
ALP BLD-CCNC: 69 U/L (ref 40–129)
ALT SERPL-CCNC: 14 U/L (ref 0–40)
ANION GAP SERPL CALCULATED.3IONS-SCNC: 9 MMOL/L (ref 7–16)
AST SERPL-CCNC: 19 U/L (ref 0–39)
BASOPHILS ABSOLUTE: 0.05 E9/L (ref 0–0.2)
BASOPHILS RELATIVE PERCENT: 0.8 % (ref 0–2)
BILIRUB SERPL-MCNC: 0.4 MG/DL (ref 0–1.2)
BILIRUBIN URINE: NEGATIVE
BLOOD, URINE: NEGATIVE
BUN BLDV-MCNC: 14 MG/DL (ref 8–23)
CALCIUM SERPL-MCNC: 9.4 MG/DL (ref 8.6–10.2)
CHLORIDE BLD-SCNC: 103 MMOL/L (ref 98–107)
CHOLESTEROL, TOTAL: 243 MG/DL (ref 0–199)
CLARITY: CLEAR
CO2: 27 MMOL/L (ref 22–29)
COLOR: YELLOW
CREAT SERPL-MCNC: 1.1 MG/DL (ref 0.7–1.2)
EOSINOPHILS ABSOLUTE: 0.35 E9/L (ref 0.05–0.5)
EOSINOPHILS RELATIVE PERCENT: 5.4 % (ref 0–6)
FERRITIN: 280 NG/ML
FOLATE: 19.8 NG/ML (ref 4.8–24.2)
GFR AFRICAN AMERICAN: >60
GFR NON-AFRICAN AMERICAN: >60 ML/MIN/1.73
GLUCOSE BLD-MCNC: 84 MG/DL (ref 74–99)
GLUCOSE URINE: NEGATIVE MG/DL
HBA1C MFR BLD: 5.4 % (ref 4–5.6)
HCT VFR BLD CALC: 44.8 % (ref 37–54)
HDLC SERPL-MCNC: 51 MG/DL
HEMOGLOBIN: 14 G/DL (ref 12.5–16.5)
IMMATURE GRANULOCYTES #: 0.02 E9/L
IMMATURE GRANULOCYTES %: 0.3 % (ref 0–5)
IRON SATURATION: 49 % (ref 20–55)
IRON: 125 MCG/DL (ref 59–158)
KETONES, URINE: NEGATIVE MG/DL
LDL CHOLESTEROL CALCULATED: 154 MG/DL (ref 0–99)
LEUKOCYTE ESTERASE, URINE: NEGATIVE
LYMPHOCYTES ABSOLUTE: 1.76 E9/L (ref 1.5–4)
LYMPHOCYTES RELATIVE PERCENT: 27 % (ref 20–42)
MCH RBC QN AUTO: 29.8 PG (ref 26–35)
MCHC RBC AUTO-ENTMCNC: 31.3 % (ref 32–34.5)
MCV RBC AUTO: 95.3 FL (ref 80–99.9)
MONOCYTES ABSOLUTE: 0.72 E9/L (ref 0.1–0.95)
MONOCYTES RELATIVE PERCENT: 11.1 % (ref 2–12)
NEUTROPHILS ABSOLUTE: 3.61 E9/L (ref 1.8–7.3)
NEUTROPHILS RELATIVE PERCENT: 55.4 % (ref 43–80)
NITRITE, URINE: NEGATIVE
PDW BLD-RTO: 12.6 FL (ref 11.5–15)
PH UA: 6.5 (ref 5–9)
PLATELET # BLD: 304 E9/L (ref 130–450)
PMV BLD AUTO: 10.6 FL (ref 7–12)
POTASSIUM SERPL-SCNC: 4.2 MMOL/L (ref 3.5–5)
PROTEIN UA: NEGATIVE MG/DL
RBC # BLD: 4.7 E12/L (ref 3.8–5.8)
SODIUM BLD-SCNC: 139 MMOL/L (ref 132–146)
SPECIFIC GRAVITY UA: 1.02 (ref 1–1.03)
TOTAL IRON BINDING CAPACITY: 256 MCG/DL (ref 250–450)
TOTAL PROTEIN: 7.1 G/DL (ref 6.4–8.3)
TRIGL SERPL-MCNC: 191 MG/DL (ref 0–149)
TSH SERPL DL<=0.05 MIU/L-ACNC: 3.12 UIU/ML (ref 0.27–4.2)
UROBILINOGEN, URINE: 0.2 E.U./DL
VITAMIN B-12: 329 PG/ML (ref 211–946)
VITAMIN D 25-HYDROXY: 35 NG/ML (ref 30–100)
VLDLC SERPL CALC-MCNC: 38 MG/DL
WBC # BLD: 6.5 E9/L (ref 4.5–11.5)

## 2020-12-11 ENCOUNTER — OFFICE VISIT (OUTPATIENT)
Dept: FAMILY MEDICINE CLINIC | Age: 70
End: 2020-12-11
Payer: MEDICARE

## 2020-12-11 VITALS
TEMPERATURE: 97.8 F | SYSTOLIC BLOOD PRESSURE: 138 MMHG | WEIGHT: 167 LBS | HEIGHT: 64 IN | BODY MASS INDEX: 28.51 KG/M2 | DIASTOLIC BLOOD PRESSURE: 86 MMHG | HEART RATE: 64 BPM | OXYGEN SATURATION: 98 %

## 2020-12-11 PROCEDURE — 90732 PPSV23 VACC 2 YRS+ SUBQ/IM: CPT | Performed by: FAMILY MEDICINE

## 2020-12-11 PROCEDURE — G0439 PPPS, SUBSEQ VISIT: HCPCS | Performed by: FAMILY MEDICINE

## 2020-12-11 PROCEDURE — G0009 ADMIN PNEUMOCOCCAL VACCINE: HCPCS | Performed by: FAMILY MEDICINE

## 2020-12-11 RX ORDER — ERGOCALCIFEROL 1.25 MG/1
50000 CAPSULE ORAL WEEKLY
Qty: 12 CAPSULE | Refills: 1 | Status: SHIPPED
Start: 2020-12-11 | End: 2021-06-14

## 2020-12-11 RX ORDER — SILDENAFIL CITRATE 20 MG/1
20 TABLET ORAL PRN
Qty: 30 TABLET | Refills: 5 | Status: SHIPPED
Start: 2020-12-11 | End: 2021-06-11 | Stop reason: SDUPTHER

## 2020-12-11 RX ORDER — ATORVASTATIN CALCIUM 20 MG/1
20 TABLET, FILM COATED ORAL EVERY EVENING
Qty: 90 TABLET | Refills: 1 | Status: SHIPPED
Start: 2020-12-11 | End: 2021-08-02

## 2020-12-11 ASSESSMENT — LIFESTYLE VARIABLES
HAVE YOU OR SOMEONE ELSE BEEN INJURED AS A RESULT OF YOUR DRINKING: 0
HOW OFTEN DO YOU HAVE A DRINK CONTAINING ALCOHOL: TWO TO THREE TIMES A WEEK
HOW OFTEN DURING THE LAST YEAR HAVE YOU HAD A FEELING OF GUILT OR REMORSE AFTER DRINKING: 0
HOW OFTEN DO YOU HAVE SIX OR MORE DRINKS ON ONE OCCASION: MONTHLY
HOW OFTEN DURING THE LAST YEAR HAVE YOU FAILED TO DO WHAT WAS NORMALLY EXPECTED FROM YOU BECAUSE OF DRINKING: NEVER
AUDIT TOTAL SCORE: 6
HAS A RELATIVE, FRIEND, DOCTOR, OR ANOTHER HEALTH PROFESSIONAL EXPRESSED CONCERN ABOUT YOUR DRINKING OR SUGGESTED YOU CUT DOWN: 0
HOW OFTEN DURING THE LAST YEAR HAVE YOU FOUND THAT YOU WERE NOT ABLE TO STOP DRINKING ONCE YOU HAD STARTED: 0
HAS A RELATIVE, FRIEND, DOCTOR, OR ANOTHER HEALTH PROFESSIONAL EXPRESSED CONCERN ABOUT YOUR DRINKING OR SUGGESTED YOU CUT DOWN: NO
AUDIT-C TOTAL SCORE: 6
HOW OFTEN DO YOU HAVE SIX OR MORE DRINKS ON ONE OCCASION: 2
HOW OFTEN DURING THE LAST YEAR HAVE YOU HAD A FEELING OF GUILT OR REMORSE AFTER DRINKING: NEVER
HOW OFTEN DURING THE LAST YEAR HAVE YOU FOUND THAT YOU WERE NOT ABLE TO STOP DRINKING ONCE YOU HAD STARTED: NEVER
HOW MANY STANDARD DRINKS CONTAINING ALCOHOL DO YOU HAVE ON A TYPICAL DAY: 1
AUDIT-C TOTAL SCORE: 0
HOW OFTEN DO YOU HAVE A DRINK CONTAINING ALCOHOL: 3
HOW OFTEN DURING THE LAST YEAR HAVE YOU NEEDED AN ALCOHOLIC DRINK FIRST THING IN THE MORNING TO GET YOURSELF GOING AFTER A NIGHT OF HEAVY DRINKING: 0
HOW MANY STANDARD DRINKS CONTAINING ALCOHOL DO YOU HAVE ON A TYPICAL DAY: THREE OR FOUR
HAVE YOU OR SOMEONE ELSE BEEN INJURED AS A RESULT OF YOUR DRINKING: NO
HOW OFTEN DURING THE LAST YEAR HAVE YOU BEEN UNABLE TO REMEMBER WHAT HAPPENED THE NIGHT BEFORE BECAUSE YOU HAD BEEN DRINKING: NEVER
HOW OFTEN DURING THE LAST YEAR HAVE YOU BEEN UNABLE TO REMEMBER WHAT HAPPENED THE NIGHT BEFORE BECAUSE YOU HAD BEEN DRINKING: 0
HOW OFTEN DURING THE LAST YEAR HAVE YOU FAILED TO DO WHAT WAS NORMALLY EXPECTED FROM YOU BECAUSE OF DRINKING: 0
AUDIT TOTAL SCORE: 0
HOW OFTEN DURING THE LAST YEAR HAVE YOU NEEDED AN ALCOHOLIC DRINK FIRST THING IN THE MORNING TO GET YOURSELF GOING AFTER A NIGHT OF HEAVY DRINKING: NEVER

## 2020-12-11 ASSESSMENT — PATIENT HEALTH QUESTIONNAIRE - PHQ9
SUM OF ALL RESPONSES TO PHQ9 QUESTIONS 1 & 2: 0
2. FEELING DOWN, DEPRESSED OR HOPELESS: 0
SUM OF ALL RESPONSES TO PHQ QUESTIONS 1-9: 0
1. LITTLE INTEREST OR PLEASURE IN DOING THINGS: 0
SUM OF ALL RESPONSES TO PHQ QUESTIONS 1-9: 0
SUM OF ALL RESPONSES TO PHQ QUESTIONS 1-9: 0

## 2021-02-08 LAB — COLOGUARD TEST, EXTERNAL: NEGATIVE

## 2021-03-01 ENCOUNTER — TELEPHONE (OUTPATIENT)
Dept: CARDIOLOGY CLINIC | Age: 71
End: 2021-03-01

## 2021-03-01 DIAGNOSIS — R00.2 PALPITATIONS: Primary | ICD-10-CM

## 2021-03-01 NOTE — TELEPHONE ENCOUNTER
Patient called with complaints of his heart pounding when he lays down to go to sleep. Symptoms have been occurring over the last 2-3 weeks. No other complaints. Last seen 2019. Please advise.

## 2021-03-02 ENCOUNTER — NURSE ONLY (OUTPATIENT)
Dept: CARDIOLOGY CLINIC | Age: 71
End: 2021-03-02

## 2021-03-02 NOTE — PROGRESS NOTES
Patient was seen in office today for the placement of a 48 hour holter per . Patient tolerated well & understood instructions. Device # [id number]  Shari Parks MA.

## 2021-03-02 NOTE — TELEPHONE ENCOUNTER
Patient notified of Dr. Ching Jay recommendation. Patient states he would like to talk to his insurance company first regarding coverage. He will call me back if he wishes to proceed.

## 2021-03-15 DIAGNOSIS — R00.2 PALPITATIONS: ICD-10-CM

## 2021-03-22 ENCOUNTER — TELEPHONE (OUTPATIENT)
Dept: CARDIOLOGY CLINIC | Age: 71
End: 2021-03-22

## 2021-03-22 NOTE — TELEPHONE ENCOUNTER
----- Message from Hannah Jones MD sent at 3/22/2021  6:32 AM EDT -----  Sinus rhythm, average HR normal (HR 79), infrequent PAC's and PVC's, continue current care

## 2021-06-02 ENCOUNTER — TELEPHONE (OUTPATIENT)
Dept: FAMILY MEDICINE CLINIC | Age: 71
End: 2021-06-02

## 2021-06-02 DIAGNOSIS — E55.9 VITAMIN D DEFICIENCY: ICD-10-CM

## 2021-06-02 DIAGNOSIS — Z12.5 SCREENING FOR PROSTATE CANCER: ICD-10-CM

## 2021-06-02 DIAGNOSIS — R73.01 IMPAIRED FASTING GLUCOSE: ICD-10-CM

## 2021-06-02 DIAGNOSIS — I10 ESSENTIAL HYPERTENSION: Primary | ICD-10-CM

## 2021-06-02 NOTE — TELEPHONE ENCOUNTER
Anai - Wife  She is calling about his appointment with you on the 11th. He would like to go to the lab before that appt for blood work. Her order is in, but he does not have one.

## 2021-06-11 ENCOUNTER — OFFICE VISIT (OUTPATIENT)
Dept: FAMILY MEDICINE CLINIC | Age: 71
End: 2021-06-11
Payer: MEDICARE

## 2021-06-11 VITALS
HEART RATE: 56 BPM | OXYGEN SATURATION: 98 % | TEMPERATURE: 97.2 F | SYSTOLIC BLOOD PRESSURE: 138 MMHG | DIASTOLIC BLOOD PRESSURE: 88 MMHG | HEIGHT: 63 IN | WEIGHT: 157 LBS | BODY MASS INDEX: 27.82 KG/M2

## 2021-06-11 DIAGNOSIS — G89.29 CHRONIC PAIN OF RIGHT KNEE: ICD-10-CM

## 2021-06-11 DIAGNOSIS — N52.9 VASCULOGENIC ERECTILE DYSFUNCTION, UNSPECIFIED VASCULOGENIC ERECTILE DYSFUNCTION TYPE: ICD-10-CM

## 2021-06-11 DIAGNOSIS — Z98.890 S/P MVR (MITRAL VALVE REPAIR): ICD-10-CM

## 2021-06-11 DIAGNOSIS — I34.1 MVP (MITRAL VALVE PROLAPSE): ICD-10-CM

## 2021-06-11 DIAGNOSIS — R42 DIZZINESS AND GIDDINESS: ICD-10-CM

## 2021-06-11 DIAGNOSIS — E78.2 MIXED HYPERLIPIDEMIA: Primary | ICD-10-CM

## 2021-06-11 DIAGNOSIS — M25.561 CHRONIC PAIN OF RIGHT KNEE: ICD-10-CM

## 2021-06-11 DIAGNOSIS — I34.0 NONRHEUMATIC MITRAL VALVE REGURGITATION: ICD-10-CM

## 2021-06-11 PROBLEM — R26.2 DIFFICULTY WALKING: Status: RESOLVED | Noted: 2019-06-17 | Resolved: 2021-06-11

## 2021-06-11 PROCEDURE — 99214 OFFICE O/P EST MOD 30 MIN: CPT | Performed by: FAMILY MEDICINE

## 2021-06-11 RX ORDER — MECLIZINE HYDROCHLORIDE 25 MG/1
25 TABLET ORAL 3 TIMES DAILY PRN
Qty: 90 TABLET | Refills: 5 | Status: SHIPPED
Start: 2021-06-11 | End: 2021-12-17

## 2021-06-11 RX ORDER — SILDENAFIL CITRATE 20 MG/1
20 TABLET ORAL PRN
Qty: 30 TABLET | Refills: 5 | Status: SHIPPED
Start: 2021-06-11 | End: 2021-09-21

## 2021-06-11 ASSESSMENT — PATIENT HEALTH QUESTIONNAIRE - PHQ9
2. FEELING DOWN, DEPRESSED OR HOPELESS: 0
SUM OF ALL RESPONSES TO PHQ QUESTIONS 1-9: 0
1. LITTLE INTEREST OR PLEASURE IN DOING THINGS: 0
SUM OF ALL RESPONSES TO PHQ9 QUESTIONS 1 & 2: 0

## 2021-06-11 ASSESSMENT — ENCOUNTER SYMPTOMS
CONSTIPATION: 0
NAUSEA: 0
COUGH: 0
BACK PAIN: 0
ABDOMINAL PAIN: 0
DIARRHEA: 0
WHEEZING: 0
SHORTNESS OF BREATH: 0
VOMITING: 0

## 2021-06-11 NOTE — PROGRESS NOTES
21  Freddie Burroughs : 1950 Sex: male  Age: 80 y.o. Chief Complaint   Patient presents with    Fatigue     HPI:  80 y.o. male patient presents today for 6 month(s) follow up of chronic medical conditions, medication refills and FBW. Patient's chart, medical, surgical and medication history all reviewed. Hyperlipidemia  The 10-year ASCVD risk score (Jessi Mancia, et al., 2013) is: 22.1%    Values used to calculate the score:      Age: 80 years      Sex: Male      Is Non- : No      Diabetic: No      Tobacco smoker: No      Systolic Blood Pressure: 915 mmHg      Is BP treated: No      HDL Cholesterol: 51 mg/dL      Total Cholesterol: 243 mg/dL    Fatigue  Patient complains of fatigue. Symptoms began several months ago. Sentinal symptom the patient feels fatigue began with: none. Symptoms of his fatigue have been change in appetite, decreased libido, fatigue with paradoxical insomnia, general malaise and hypersomnolence. Patient describes the following psychologic symptoms: none. Patient denies fever, significant change in weight, symptoms of arthritis, exercise intolerance, unusual rashes, cold intolerance, constipation and change in hair texture. and witnessed or suspected sleep apnea. Symptoms have gradually worsened. Severity has been moderate. Previous visits for this problem: yes, last seen 1 year ago by me. Previous labs have provided no benefit. Patient having intermittent episodes of vertigo for the last 3-4 years. States they come on all of a sudden- will turn his head quickly or bend in a certain way prior to onset- and then he has to drop to the ground and sit there for a while before it stops. Becomes very nauseated and diaphoretic. Has seen ENT and Audiology without benefit. Also having significant palpitations at bedtime. Has seen cardiology. Cardiac event monitor only showing few PACs and PVCs, but nothing significant at night when he feels this. ROS:  Review of Systems   Constitutional: Positive for activity change, appetite change, diaphoresis and fatigue. Negative for chills, fever and unexpected weight change. Respiratory: Negative for cough, shortness of breath and wheezing. Cardiovascular: Positive for palpitations. Negative for chest pain. Gastrointestinal: Negative for abdominal pain, constipation, diarrhea, nausea and vomiting. Musculoskeletal: Positive for arthralgias and gait problem. Negative for back pain. Skin: Negative for rash. Neurological: Positive for dizziness. Negative for headaches. Psychiatric/Behavioral: Positive for sleep disturbance. Negative for dysphoric mood. The patient is not nervous/anxious. All other systems reviewed and are negative. Current Outpatient Medications on File Prior to Visit   Medication Sig Dispense Refill    vitamin D (ERGOCALCIFEROL) 1.25 MG (50446 UT) CAPS capsule Take 1 capsule by mouth once a week 12 capsule 1    atorvastatin (LIPITOR) 20 MG tablet Take 1 tablet by mouth every evening 90 tablet 1    naproxen (NAPROSYN) 500 MG tablet Take 1 tablet by mouth 2 times daily (with meals) 20 tablet 1    senna (SENOKOT) 8.6 MG tablet Take 1 tablet by mouth      ibuprofen (ADVIL;MOTRIN) 800 MG tablet take 1 tablet by mouth every 8 hours if needed for pain  0    omeprazole (PRILOSEC) 20 MG capsule Take 20 mg by mouth every other day        No current facility-administered medications on file prior to visit.        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 Carlos Garcia; will need phlebotomy if ferritin >500    DDD (degenerative disc disease), cervical     Diverticulosis     DJD (degenerative joint disease)     C/T/L Spines    GERD Resource Strain:     Difficulty of Paying Living Expenses:    Food Insecurity:     Worried About Running Out of Food in the Last Year:     [address] in the Last Year:    Transportation Needs:     Lack of Transportation (Medical):  Lack of Transportation (Non-Medical):    Physical Activity:     Days of Exercise per Week:     Minutes of Exercise per Session:    Stress:     Feeling of Stress :    Social Connections:     Frequency of Communication with Friends and Family:     Frequency of Social Gatherings with Friends and Family:     Attends Mu-[employer] Services:     Active Member of Clubs or Organizations:     Attends Club or Organization Meetings:     Marital Status:    Intimate Partner Violence:     Fear of Current or Ex-Partner:     Emotionally Abused:     Physically Abused:     Sexually Abused:        Vitals:    06/11/21 1312   BP: 138/88   Pulse: 56   Temp: 97.2 °F (36.2 °C)   TempSrc: Temporal   SpO2: 98%   Weight: 157 lb (71.2 kg)   Height: 5' 2.75\" (1.594 m)       Physical Exam:  Physical Exam  Vitals and nursing note reviewed. Constitutional:       General: He is not in acute distress. Appearance: Normal appearance. He is well-developed and normal weight. He is not ill-appearing. HENT:      Head: Normocephalic and atraumatic. Right Ear: Hearing and external ear normal.      Left Ear: Hearing and external ear normal.      Nose:      Comments: Patient wearing mask  Eyes:      General: Lids are normal. No scleral icterus. Extraocular Movements: Extraocular movements intact. Conjunctiva/sclera: Conjunctivae normal.   Neck:      Thyroid: No thyromegaly. Vascular: No carotid bruit. Cardiovascular:      Rate and Rhythm: Normal rate and regular rhythm. Heart sounds: Normal heart sounds. No murmur heard. Pulmonary:      Effort: Pulmonary effort is normal. No respiratory distress. Breath sounds: Normal breath sounds. No wheezing.    Musculoskeletal: General: No tenderness or deformity. Normal range of motion. Cervical back: Normal range of motion and neck supple. Right lower leg: No edema. Left lower leg: No edema. Lymphadenopathy:      Cervical: No cervical adenopathy. Skin:     General: Skin is warm and dry. Findings: No rash. Neurological:      General: No focal deficit present. Mental Status: He is alert and oriented to person, place, and time. Gait: Gait normal.   Psychiatric:         Mood and Affect: Mood and affect normal.         Speech: Speech normal.         Behavior: Behavior normal.         Thought Content:  Thought content normal.         Labs:  CBC with Differential:    Lab Results   Component Value Date    WBC 6.5 12/03/2020    RBC 4.70 12/03/2020    HGB 14.0 12/03/2020    HCT 44.8 12/03/2020     12/03/2020    MCV 95.3 12/03/2020    MCH 29.8 12/03/2020    MCHC 31.3 12/03/2020    RDW 12.6 12/03/2020    LYMPHOPCT 27.0 12/03/2020    MONOPCT 11.1 12/03/2020    BASOPCT 0.8 12/03/2020    MONOSABS 0.72 12/03/2020    LYMPHSABS 1.76 12/03/2020    EOSABS 0.35 12/03/2020    BASOSABS 0.05 12/03/2020     CMP:    Lab Results   Component Value Date     12/03/2020    K 4.2 12/03/2020     12/03/2020    CO2 27 12/03/2020    BUN 14 12/03/2020    CREATININE 1.1 12/03/2020    GFRAA >60 12/03/2020    LABGLOM >60 12/03/2020    GLUCOSE 84 12/03/2020    PROT 7.1 12/03/2020    LABALBU 3.9 12/03/2020    CALCIUM 9.4 12/03/2020    BILITOT 0.4 12/03/2020    ALKPHOS 69 12/03/2020    AST 19 12/03/2020    ALT 14 12/03/2020     Uric Acid:  No results found for: Lidia Verma  HgBA1c:    Lab Results   Component Value Date    LABA1C 5.4 12/03/2020     Microalbumen/Creatinine ratio:  No components found for: RUCREAT  FLP:    Lab Results   Component Value Date    TRIG 191 12/03/2020    HDL 51 12/03/2020    LDLCALC 154 12/03/2020    LABVLDL 38 12/03/2020     TSH:    Lab Results   Component Value Date    TSH 3.120 12/03/2020     VITAMIN B12: No components found for: B12  IRON:    Lab Results   Component Value Date    IRON 125 12/03/2020     FERRITIN:    Lab Results   Component Value Date    FERRITIN 280 12/03/2020          Assessment and Plan:  Estela Veras was seen today for fatigue. Diagnoses and all orders for this visit:    Mixed hyperlipidemia  Due for fasting labs. Already ordered. Dizziness and giddiness  -     meclizine (ANTIVERT) 25 MG tablet; Take 1 tablet by mouth 3 times daily as needed for Dizziness  -     US CAROTID ARTERY BILATERAL; Future  -     ECHO Complete 2D W Doppler W Color; Future  Will check Echo and Carotids due to dizziness and palpitations. Has history of diastolic dysfunction, but hasn't been rechecked in a while. Nonrheumatic mitral valve regurgitation  -     ECHO Complete 2D W Doppler W Color; Future    MVP (mitral valve prolapse)  -     ECHO Complete 2D W Doppler W Color; Future    S/P MVR (mitral valve repair)  -     ECHO Complete 2D W Doppler W Color; Future    Vasculogenic erectile dysfunction, unspecified vasculogenic erectile dysfunction type  -     sildenafil (REVATIO) 20 MG tablet; Take 1 tablet by mouth as needed (ED)    Chronic pain of right knee  Needs replacement, but patient not ready yet. Return in about 6 months (around 12/11/2021) for AWV.       Seen By:  Nicole Amaral DO

## 2021-06-14 DIAGNOSIS — E55.9 VITAMIN D DEFICIENCY: ICD-10-CM

## 2021-06-14 RX ORDER — ERGOCALCIFEROL 1.25 MG/1
CAPSULE ORAL
Qty: 12 CAPSULE | Refills: 1 | Status: SHIPPED
Start: 2021-06-14 | End: 2021-12-17 | Stop reason: SDUPTHER

## 2021-06-16 DIAGNOSIS — E55.9 VITAMIN D DEFICIENCY: ICD-10-CM

## 2021-06-16 DIAGNOSIS — R73.01 IMPAIRED FASTING GLUCOSE: ICD-10-CM

## 2021-06-16 DIAGNOSIS — I10 ESSENTIAL HYPERTENSION: ICD-10-CM

## 2021-06-16 LAB
ALBUMIN SERPL-MCNC: 4.2 G/DL (ref 3.5–5.2)
ALP BLD-CCNC: 66 U/L (ref 40–129)
ALT SERPL-CCNC: 13 U/L (ref 0–40)
ANION GAP SERPL CALCULATED.3IONS-SCNC: 15 MMOL/L (ref 7–16)
AST SERPL-CCNC: 21 U/L (ref 0–39)
BACTERIA: ABNORMAL /HPF
BASOPHILS ABSOLUTE: 0.04 E9/L (ref 0–0.2)
BASOPHILS RELATIVE PERCENT: 0.8 % (ref 0–2)
BILIRUB SERPL-MCNC: 0.3 MG/DL (ref 0–1.2)
BILIRUBIN URINE: NEGATIVE
BLOOD, URINE: NEGATIVE
BUN BLDV-MCNC: 23 MG/DL (ref 6–23)
CALCIUM SERPL-MCNC: 9.9 MG/DL (ref 8.6–10.2)
CHLORIDE BLD-SCNC: 107 MMOL/L (ref 98–107)
CHOLESTEROL, TOTAL: 211 MG/DL (ref 0–199)
CLARITY: ABNORMAL
CO2: 20 MMOL/L (ref 22–29)
COLOR: YELLOW
CREAT SERPL-MCNC: 1.2 MG/DL (ref 0.7–1.2)
EOSINOPHILS ABSOLUTE: 0.26 E9/L (ref 0.05–0.5)
EOSINOPHILS RELATIVE PERCENT: 4.9 % (ref 0–6)
GFR AFRICAN AMERICAN: >60
GFR NON-AFRICAN AMERICAN: 60 ML/MIN/1.73
GLUCOSE BLD-MCNC: 77 MG/DL (ref 74–99)
GLUCOSE URINE: NEGATIVE MG/DL
HBA1C MFR BLD: 5.2 % (ref 4–5.6)
HCT VFR BLD CALC: 42.2 % (ref 37–54)
HDLC SERPL-MCNC: 55 MG/DL
HEMOGLOBIN: 13.3 G/DL (ref 12.5–16.5)
IMMATURE GRANULOCYTES #: 0.01 E9/L
IMMATURE GRANULOCYTES %: 0.2 % (ref 0–5)
KETONES, URINE: NEGATIVE MG/DL
LDL CHOLESTEROL CALCULATED: 140 MG/DL (ref 0–99)
LEUKOCYTE ESTERASE, URINE: NEGATIVE
LYMPHOCYTES ABSOLUTE: 1.4 E9/L (ref 1.5–4)
LYMPHOCYTES RELATIVE PERCENT: 26.6 % (ref 20–42)
MCH RBC QN AUTO: 29.6 PG (ref 26–35)
MCHC RBC AUTO-ENTMCNC: 31.5 % (ref 32–34.5)
MCV RBC AUTO: 94 FL (ref 80–99.9)
MONOCYTES ABSOLUTE: 0.58 E9/L (ref 0.1–0.95)
MONOCYTES RELATIVE PERCENT: 11 % (ref 2–12)
NEUTROPHILS ABSOLUTE: 2.98 E9/L (ref 1.8–7.3)
NEUTROPHILS RELATIVE PERCENT: 56.5 % (ref 43–80)
NITRITE, URINE: NEGATIVE
PDW BLD-RTO: 13.2 FL (ref 11.5–15)
PH UA: 5.5 (ref 5–9)
PLATELET # BLD: 335 E9/L (ref 130–450)
PMV BLD AUTO: 10.4 FL (ref 7–12)
POTASSIUM SERPL-SCNC: 4.8 MMOL/L (ref 3.5–5)
PROTEIN UA: NEGATIVE MG/DL
RBC # BLD: 4.49 E12/L (ref 3.8–5.8)
RBC UA: ABNORMAL /HPF (ref 0–2)
SODIUM BLD-SCNC: 142 MMOL/L (ref 132–146)
SPECIFIC GRAVITY UA: >=1.03 (ref 1–1.03)
TOTAL PROTEIN: 7.4 G/DL (ref 6.4–8.3)
TRIGL SERPL-MCNC: 79 MG/DL (ref 0–149)
TSH SERPL DL<=0.05 MIU/L-ACNC: 3.58 UIU/ML (ref 0.27–4.2)
UROBILINOGEN, URINE: 0.2 E.U./DL
VITAMIN D 25-HYDROXY: 32 NG/ML (ref 30–100)
VLDLC SERPL CALC-MCNC: 16 MG/DL
WBC # BLD: 5.3 E9/L (ref 4.5–11.5)
WBC UA: ABNORMAL /HPF (ref 0–5)

## 2021-07-19 ENCOUNTER — HOSPITAL ENCOUNTER (OUTPATIENT)
Dept: NON INVASIVE DIAGNOSTICS | Age: 71
Discharge: HOME | End: 2021-07-19
Payer: MEDICARE

## 2021-07-19 DIAGNOSIS — Z98.890 S/P MVR (MITRAL VALVE REPAIR): ICD-10-CM

## 2021-07-19 DIAGNOSIS — I34.1 MVP (MITRAL VALVE PROLAPSE): ICD-10-CM

## 2021-07-19 DIAGNOSIS — R42 DIZZINESS AND GIDDINESS: ICD-10-CM

## 2021-07-19 DIAGNOSIS — I34.0 NONRHEUMATIC MITRAL VALVE REGURGITATION: ICD-10-CM

## 2021-07-19 LAB
LV EF: 60 %
LVEF MODALITY: NORMAL

## 2021-07-19 PROCEDURE — 93306 TTE W/DOPPLER COMPLETE: CPT

## 2021-07-31 DIAGNOSIS — E78.2 MIXED HYPERLIPIDEMIA: ICD-10-CM

## 2021-08-02 RX ORDER — ATORVASTATIN CALCIUM 20 MG/1
20 TABLET, FILM COATED ORAL EVERY EVENING
Qty: 90 TABLET | Refills: 1 | Status: SHIPPED
Start: 2021-08-02 | End: 2021-12-17 | Stop reason: SDUPTHER

## 2021-08-03 ENCOUNTER — OFFICE VISIT (OUTPATIENT)
Dept: ORTHOPEDIC SURGERY | Age: 71
End: 2021-08-03
Payer: MEDICARE

## 2021-08-03 DIAGNOSIS — M17.11 PRIMARY OSTEOARTHRITIS OF RIGHT KNEE: ICD-10-CM

## 2021-08-03 DIAGNOSIS — M25.561 RIGHT KNEE PAIN, UNSPECIFIED CHRONICITY: Primary | ICD-10-CM

## 2021-08-03 PROCEDURE — 99204 OFFICE O/P NEW MOD 45 MIN: CPT | Performed by: ORTHOPAEDIC SURGERY

## 2021-08-03 NOTE — PROGRESS NOTES
Follow Up Visit     Ingrid Mccoy returns today for follow up visit regarding right knee pain. Treatment thus far has included Cortisone injection and Euflexxa series completed in March 2017 with mild improvement. He reports symptoms are about the same. His pain has worsened. He is interested in surgery discussion. REVIEW OF SYSTEMS:     Constitutional:  Negative for weight loss, fevers, chills, fatigue  Cardiovascular: Negative for chest pain, palpitations  Pulmonary: Negative for shortness of breath, labored breathing, cough  GI: negative for abdominal pain, nausea, vomitting   MSK: per HPI  Skin: negative for rash, open wounds    All other systems reviewed and are negative       Physical Exam:     No data recorded    General: Alert and oriented x3, no acute distress  Cardiovascular/pulmonary: No labored breathing, peripheral perfusion intact  Musculoskeletal:    Exam of the knee shows lateral joint line tenderness mild valgus alignment. Range of motion is about 5 to 120 degrees. The knee is grossly stable. Controlled Substances Monitoring:      Imaging:  X-rays right knee 4 views show end-stage arthritic change of the right knee with valgus alignment    Impression: End-stage arthritis right knee with valgus alignment      Assessment: Right knee osteoarthritis that has failed conservative treatment      Plan:   We discussed his knee today. He has attempted injections and extensive conservative treatment and his pain has progressed. He is now at a point where he is interested in proceeding with surgery. We discussed this would involve right knee Sabas robot assisted total knee arthroplasty. We will obtain a CT scan for planning. He will require preoperative clearance. We will see him back for a preoperative visit.     Dee Brooke MD  Orthopaedic Surgery   8/3/21  8:57 AM

## 2021-08-06 ENCOUNTER — TELEPHONE (OUTPATIENT)
Dept: FAMILY MEDICINE CLINIC | Age: 71
End: 2021-08-06

## 2021-08-06 NOTE — TELEPHONE ENCOUNTER
Patients wife calling to let you know that patient has decided to have knee replacement done. She was told by Dr Lupe Wallace to call and see if you could clear him for surgery.  His surgery date is 10/18/21 Sarecycline Counseling: Patient advised regarding possible photosensitivity and discoloration of the teeth, skin, lips, tongue and gums.  Patient instructed to avoid sunlight, if possible.  When exposed to sunlight, patients should wear protective clothing, sunglasses, and sunscreen.  The patient was instructed to call the office immediately if the following severe adverse effects occur:  hearing changes, easy bruising/bleeding, severe headache, or vision changes.  The patient verbalized understanding of the proper use and possible adverse effects of sarecycline.  All of the patient's questions and concerns were addressed. Azithromycin Counseling:  I discussed with the patient the risks of azithromycin including but not limited to GI upset, allergic reaction, drug rash, diarrhea, and yeast infections. Bactrim Pregnancy And Lactation Text: This medication is Pregnancy Category D and is known to cause fetal risk.  It is also excreted in breast milk. Dapsone Counseling: I discussed with the patient the risks of dapsone including but not limited to hemolytic anemia, agranulocytosis, rashes, methemoglobinemia, kidney failure, peripheral neuropathy, headaches, GI upset, and liver toxicity.  Patients who start dapsone require monitoring including baseline LFTs and weekly CBCs for the first month, then every month thereafter.  The patient verbalized understanding of the proper use and possible adverse effects of dapsone.  All of the patient's questions and concerns were addressed. Tazorac Counseling:  Patient advised that medication is irritating and drying.  Patient may need to apply sparingly and wash off after an hour before eventually leaving it on overnight.  The patient verbalized understanding of the proper use and possible adverse effects of tazorac.  All of the patient's questions and concerns were addressed. Detail Level: Zone Use Enhanced Medication Counseling?: No Benzoyl Peroxide Pregnancy And Lactation Text: This medication is Pregnancy Category C. It is unknown if benzoyl peroxide is excreted in breast milk. Doxycycline Pregnancy And Lactation Text: This medication is Pregnancy Category D and not consider safe during pregnancy. It is also excreted in breast milk but is considered safe for shorter treatment courses. Topical Sulfur Applications Counseling: Topical Sulfur Counseling: Patient counseled that this medication may cause skin irritation or allergic reactions.  In the event of skin irritation, the patient was advised to reduce the amount of the drug applied or use it less frequently.   The patient verbalized understanding of the proper use and possible adverse effects of topical sulfur application.  All of the patient's questions and concerns were addressed. Isotretinoin Counseling: Patient should get monthly blood tests, not donate blood, not drive at night if vision affected, not share medication, and not undergo elective surgery for 6 months after tx completed. Side effects reviewed, pt to contact office should one occur. High Dose Vitamin A Pregnancy And Lactation Text: High dose vitamin A therapy is contraindicated during pregnancy and breast feeding. Tazorac Pregnancy And Lactation Text: This medication is not safe during pregnancy. It is unknown if this medication is excreted in breast milk. Sarecycline Pregnancy And Lactation Text: This medication is Pregnancy Category D and not consider safe during pregnancy. It is also excreted in breast milk. Tetracycline Counseling: Patient counseled regarding possible photosensitivity and increased risk for sunburn.  Patient instructed to avoid sunlight, if possible.  When exposed to sunlight, patients should wear protective clothing, sunglasses, and sunscreen.  The patient was instructed to call the office immediately if the following severe adverse effects occur:  hearing changes, easy bruising/bleeding, severe headache, or vision changes.  The patient verbalized understanding of the proper use and possible adverse effects of tetracycline.  All of the patient's questions and concerns were addressed. Patient understands to avoid pregnancy while on therapy due to potential birth defects. Azithromycin Pregnancy And Lactation Text: This medication is considered safe during pregnancy and is also secreted in breast milk. Topical Clindamycin Counseling: Patient counseled that this medication may cause skin irritation or allergic reactions.  In the event of skin irritation, the patient was advised to reduce the amount of the drug applied or use it less frequently.   The patient verbalized understanding of the proper use and possible adverse effects of clindamycin.  All of the patient's questions and concerns were addressed. Birth Control Pills Counseling: Birth Control Pill Counseling: I discussed with the patient the potential side effects of OCPs including but not limited to increased risk of stroke, heart attack, thrombophlebitis, deep venous thrombosis, hepatic adenomas, breast changes, GI upset, headaches, and depression.  The patient verbalized understanding of the proper use and possible adverse effects of OCPs. All of the patient's questions and concerns were addressed. Topical Retinoid counseling:  Patient advised to apply a pea-sized amount only at bedtime and wait 30 minutes after washing their face before applying.  If too drying, patient may add a non-comedogenic moisturizer. The patient verbalized understanding of the proper use and possible adverse effects of retinoids.  All of the patient's questions and concerns were addressed. Dapsone Pregnancy And Lactation Text: This medication is Pregnancy Category C and is not considered safe during pregnancy or breast feeding. Erythromycin Counseling:  I discussed with the patient the risks of erythromycin including but not limited to GI upset, allergic reaction, drug rash, diarrhea, increase in liver enzymes, and yeast infections. Isotretinoin Pregnancy And Lactation Text: This medication is Pregnancy Category X and is considered extremely dangerous during pregnancy. It is unknown if it is excreted in breast milk. Topical Sulfur Applications Pregnancy And Lactation Text: This medication is Pregnancy Category C and has an unknown safety profile during pregnancy. It is unknown if this topical medication is excreted in breast milk. Minocycline Counseling: Patient advised regarding possible photosensitivity and discoloration of the teeth, skin, lips, tongue and gums.  Patient instructed to avoid sunlight, if possible.  When exposed to sunlight, patients should wear protective clothing, sunglasses, and sunscreen.  The patient was instructed to call the office immediately if the following severe adverse effects occur:  hearing changes, easy bruising/bleeding, severe headache, or vision changes.  The patient verbalized understanding of the proper use and possible adverse effects of minocycline.  All of the patient's questions and concerns were addressed. Spironolactone Counseling: Patient advised regarding risks of diarrhea, abdominal pain, hyperkalemia, birth defects (for female patients), liver toxicity and renal toxicity. The patient may need blood work to monitor liver and kidney function and potassium levels while on therapy. The patient verbalized understanding of the proper use and possible adverse effects of spironolactone.  All of the patient's questions and concerns were addressed. Doxycycline Counseling:  Patient counseled regarding possible photosensitivity and increased risk for sunburn.  Patient instructed to avoid sunlight, if possible.  When exposed to sunlight, patients should wear protective clothing, sunglasses, and sunscreen.  The patient was instructed to call the office immediately if the following severe adverse effects occur:  hearing changes, easy bruising/bleeding, severe headache, or vision changes.  The patient verbalized understanding of the proper use and possible adverse effects of doxycycline.  All of the patient's questions and concerns were addressed. Erythromycin Pregnancy And Lactation Text: This medication is Pregnancy Category B and is considered safe during pregnancy. It is also excreted in breast milk. High Dose Vitamin A Counseling: Side effects reviewed, pt to contact office should one occur. Topical Retinoid Pregnancy And Lactation Text: This medication is Pregnancy Category C. It is unknown if this medication is excreted in breast milk. Bactrim Counseling:  I discussed with the patient the risks of sulfa antibiotics including but not limited to GI upset, allergic reaction, drug rash, diarrhea, dizziness, photosensitivity, and yeast infections.  Rarely, more serious reactions can occur including but not limited to aplastic anemia, agranulocytosis, methemoglobinemia, blood dyscrasias, liver or kidney failure, lung infiltrates or desquamative/blistering drug rashes. Birth Control Pills Pregnancy And Lactation Text: This medication should be avoided if pregnant and for the first 30 days post-partum. Spironolactone Pregnancy And Lactation Text: This medication can cause feminization of the male fetus and should be avoided during pregnancy. The active metabolite is also found in breast milk. Benzoyl Peroxide Counseling: Patient counseled that medicine may cause skin irritation and bleach clothing.  In the event of skin irritation, the patient was advised to reduce the amount of the drug applied or use it less frequently.   The patient verbalized understanding of the proper use and possible adverse effects of benzoyl peroxide.  All of the patient's questions and concerns were addressed. Topical Clindamycin Pregnancy And Lactation Text: This medication is Pregnancy Category B and is considered safe during pregnancy. It is unknown if it is excreted in breast milk. Detail Level: Detailed

## 2021-09-17 ENCOUNTER — OFFICE VISIT (OUTPATIENT)
Dept: CARDIOLOGY CLINIC | Age: 71
End: 2021-09-17
Payer: MEDICARE

## 2021-09-17 VITALS
HEIGHT: 63 IN | BODY MASS INDEX: 27.39 KG/M2 | SYSTOLIC BLOOD PRESSURE: 126 MMHG | DIASTOLIC BLOOD PRESSURE: 72 MMHG | WEIGHT: 154.6 LBS | RESPIRATION RATE: 18 BRPM | HEART RATE: 57 BPM

## 2021-09-17 DIAGNOSIS — Z01.810 PREOP CARDIOVASCULAR EXAM: ICD-10-CM

## 2021-09-17 DIAGNOSIS — R00.2 PALPITATIONS: ICD-10-CM

## 2021-09-17 DIAGNOSIS — I34.1 MVP (MITRAL VALVE PROLAPSE): Primary | ICD-10-CM

## 2021-09-17 DIAGNOSIS — I51.89 DIASTOLIC DYSFUNCTION: ICD-10-CM

## 2021-09-17 DIAGNOSIS — I34.0 NONRHEUMATIC MITRAL VALVE REGURGITATION: ICD-10-CM

## 2021-09-17 PROCEDURE — 93000 ELECTROCARDIOGRAM COMPLETE: CPT | Performed by: INTERNAL MEDICINE

## 2021-09-17 PROCEDURE — 99214 OFFICE O/P EST MOD 30 MIN: CPT | Performed by: INTERNAL MEDICINE

## 2021-09-17 NOTE — PROGRESS NOTES
OFFICE FOLLOW-UP    Name: Omid Betts     Age: 80 y.o. Date of Service: 9/17/2021    Chief Complaint: Follow-up for Mitral valve prolapse, Mitral regurgitation, S/P MV repair, preoperative cardiac evaluation    Referring Physician: Dr. Curtis Tellez    Interim History:   Still with intermittent dizziness/vertigo. Otherwise with no new cardiac complaints since last office visit. Maintaining sinus rhythm. He has been off metoprolol for > 4 years due to prior history of hypotension. No chest pain or MOSHER -- he walks 2-3 miles/day with no difficulties. Right knee replacement pending.     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 Master Caba; will need phlebotomy if ferritin >500    DDD (degenerative disc disease), cervical     Diverticulosis     DJD (degenerative joint disease)     C/T/L Spines    GERD (gastroesophageal reflux disease)     H/O calcium pyrophosphate deposition disease (CPPD)     [facility]2016    negative Gene mutation per testing - [place] 4/21/16 will require phlebotomy if Ferritin >500    History of calcium pyrophosphate deposition disease (CPPD) 02/2016    xray left hand, 2nd/3rd MCP    History of DVT (deep vein thrombosis)     post op    Hyperlipidemia     Hypertension     Osteoporosis     SNHL (sensorineural hearing loss) 07/05/2016    has seen Dr. Cee Alfaro       Past Surgical History:  Past Surgical History:   Procedure Laterality Date    CARDIAC CATHETERIZATION  08/19/2014    Normal    COLONOSCOPY  2013    Dr. Ivonne Hartley- repeat 5 years    CYST INCISION AND DRAINAGE      Pilonidal    LIPOMA RESECTION Left 1970's    under breast; Dr. Harleen Bynum  09/17/2014    [place]    OTHER SURGICAL HISTORY  02/17/2017    Euflexxa Injection Right knee - Darliss Rathke X3    RECTAL SURGERY      pilonidal cyst    TUMOR REMOVAL      lipoma on chest       Family History:  Family History   Problem Relation Age of Onset    Heart Disease Mother         A fib    Atrial Fibrillation Mother     Heart Disease Father     Heart Attack Father     Other Brother         Hemachromatosis     Other Sister         MS       Social History:  Social History     Socioeconomic History    Marital status:      Spouse name: Not on file    Number of children: Not on file    Years of education: Not on file    Highest education level: Not on file   Occupational History    Occupation: [employer], mows grass,etc   Tobacco Use    Smoking status: Never Smoker    Smokeless tobacco: Former User     Types: Chew    Tobacco comment: snuff user daily   Vaping Use    Vaping Use: Never used   Substance and Sexual Activity    Alcohol use: Yes     Comment: 5-6 beers a week    Drug use: No    Sexual activity: Not on file   Other Topics Concern    Not on file   Social History Narrative    Not on file     Social Determinants of Health     Financial Resource Strain:     Difficulty of Paying Living Expenses:    Food Insecurity:     Worried About [address] in the Last Year:     [facility] in the Last Year:    Transportation Needs:     Lack of Transportation (Medical):      Lack of Transportation (Non-Medical):    Physical Activity:     Days of Exercise per Week:     Minutes of Exercise per Session:    Stress:     Feeling of Stress :    Social Connections:     Frequency of Communication with Friends and Family:     Frequency of Social Gatherings with Friends and Family:     Attends Cheondoism Services:     Active Member of Clubs or Organizations:     Attends Club or Organization Meetings:     Marital Status:    Intimate Partner Violence:     Fear of Current or Ex-Partner:     Emotionally Abused:     Physically Abused:     Sexually Abused: Allergies:  No Known Allergies    Current Medications:  Current Outpatient Medications   Medication Sig Dispense Refill    atorvastatin (LIPITOR) 20 MG tablet take 1 tablet by mouth every evening 90 tablet 1    vitamin D (ERGOCALCIFEROL) 1.25 MG (62996 UT) CAPS capsule take 1 capsule by mouth every week 12 capsule 1    sildenafil (REVATIO) 20 MG tablet Take 1 tablet by mouth as needed (ED) 30 tablet 5    meclizine (ANTIVERT) 25 MG tablet Take 1 tablet by mouth 3 times daily as needed for Dizziness 90 tablet 5    naproxen (NAPROSYN) 500 MG tablet Take 1 tablet by mouth 2 times daily (with meals) 20 tablet 1    senna (SENOKOT) 8.6 MG tablet Take 1 tablet by mouth      ibuprofen (ADVIL;MOTRIN) 800 MG tablet take 1 tablet by mouth every 8 hours if needed for pain  0    omeprazole (PRILOSEC) 20 MG capsule Take 20 mg by mouth every other day        No current facility-administered medications for this visit.      Physical Exam:  /72   Pulse 57   Resp 18   Ht 5' 3\" (1.6 m)   Wt 154 lb 9.6 oz (70.1 kg)   BMI 27.39 kg/m²   Wt Readings from Last 3 Encounters:   09/17/21 154 lb 9.6 oz (70.1 kg)   06/11/21 157 lb (71.2 kg)   12/11/20 167 lb (75.8 kg)     Appearance: Awake, no distress  Skin: Intact, no rash  Head: Normocephalic, atraumatic  Eyes: EOMI, no conjunctival erythema  ENMT: MMM, no rhinorrhea  Neck: Supple, no carotid bruits  Lungs: CTA B/L, no wheezing or rales, no distress  Cardiac: Regular rate and rhythm, +S1S2, no murmurs  Abdomen: Soft, nontender, +bowel sounds  Extremities: Moves all extremities x 4, no lower extremity edema  Neurologic: No focal motor deficits apparent, normal mood and affect    Cardiac Tests:  Echocardiogram: 7/3/14  Normal LV size and function  EF 65%  Normal RV function  Mitral valve prolapse (posterior leaflet) with severe anterior directed mitral regurgitation (eccentric jet)  Mild AI  Stage 3 DD    VOLODYMYR: 8/19/14  Normal left ventricular systolic function. Ejection fraction is visually estimated at 65%. Normal right ventricular size and function. No evidence of a left atrial appendage thrombus. No evidence of intra-atrial shunting on bubble study. Severe prolapse of the P2 segment of the posterior mitral leaflet with severe anteriorly-directed mitral regurgitation (eccentric jet). Trace-mild aortic regurgitation. Echocardiogram: 5/7/15  Normal LV size  Normal LV function, EF 60%  Normal RV size and function  S/P mitral valve repair  Mean MV gradient 2.3 mmHg, trace MR  Mild AI  MIld TR  Normal estimated PASP    Echocardiogram: 7/19/21 (Dr. Efren Larios)   Left ventricular internal dimensions were normal in diastole and systole. No regional wall motion abnormalities seen. Normal left ventricular ejection fraction. Ejection fraction is visually estimated at 60%. The left atrium is mildly dilated. Borderline dilated right ventricle. Moderate mitral annular calcification. Mild mitral regurgitation is present. MV mean gradient 2.9 mmHg   The aortic valve appears mildly sclerotic. Mild aortic regurgitation is noted. EKG: SB, rate 57, no acute STT changes    ASSESSMENT / PLAN:  1. Mitral valve prolapse (P2 segment of posterior leaflet) with severe MR --> s/p minimally invasive MV repair on 9/17/14 --> results of 5/2015 and 7/2021 echocardiograms outlined above  2. History of stage 3 diastolic dysfunction on 4/1147 echocardiogram  3.  History of palpitations -- historically in sinus rhythm, he has been off metoprolol for > 4 years due to prior history of hypotension (resolved)  4. Post-op RLE ischemia --> s/p R femoral artery thrombectomy and saphenous vein patch repair on 9/18/14  5. Long-standing history of dizziness/vertigo --> work-up ongoing per PCP  6. Normal coronaries on 8/19/14 cardiac catheterization  7. Preoperative cardiac evaluation (right knee replacement)    - 3/2021 Holter monitoring (48 hours): sinus rhythm, average HR 79, no atrial fibrillation, 0.19% PVC burden (isolated PVC's, no runs), 0.06% PAC burden (longest run 6 beats)    - Results of 7/2021 echocardiogram and 3/2021 Holter monitoring reviewed with the patient today  - Continue current medications  - Endocarditis prophylaxis  - He may proceed with surgery from a cardiology standpoint    Greater than 30 minutes was spent counseling the patient, reviewing the rationale for the above recommendations and reviewing the patient's current medication list, problem list and results of all previously ordered testing.     Reyes Loaiza MD  [facility]) Cardiology

## 2021-09-21 ENCOUNTER — TELEPHONE (OUTPATIENT)
Dept: PHARMACY | Facility: CLINIC | Age: 71
End: 2021-09-21

## 2021-09-21 ENCOUNTER — OFFICE VISIT (OUTPATIENT)
Dept: PRIMARY CARE CLINIC | Age: 71
End: 2021-09-21
Payer: MEDICARE

## 2021-09-21 VITALS
HEIGHT: 63 IN | OXYGEN SATURATION: 99 % | HEART RATE: 63 BPM | SYSTOLIC BLOOD PRESSURE: 126 MMHG | TEMPERATURE: 97.3 F | DIASTOLIC BLOOD PRESSURE: 72 MMHG | WEIGHT: 153 LBS | BODY MASS INDEX: 27.11 KG/M2

## 2021-09-21 DIAGNOSIS — Z01.818 PRE-OP EXAMINATION: ICD-10-CM

## 2021-09-21 DIAGNOSIS — I34.1 MVP (MITRAL VALVE PROLAPSE): ICD-10-CM

## 2021-09-21 DIAGNOSIS — Z23 NEED FOR INFLUENZA VACCINATION: ICD-10-CM

## 2021-09-21 DIAGNOSIS — Z98.890 S/P MVR (MITRAL VALVE REPAIR): ICD-10-CM

## 2021-09-21 DIAGNOSIS — M17.11 PRIMARY OSTEOARTHRITIS OF RIGHT KNEE: Primary | ICD-10-CM

## 2021-09-21 PROCEDURE — 90694 VACC AIIV4 NO PRSRV 0.5ML IM: CPT | Performed by: FAMILY MEDICINE

## 2021-09-21 PROCEDURE — G0008 ADMIN INFLUENZA VIRUS VAC: HCPCS | Performed by: FAMILY MEDICINE

## 2021-09-21 PROCEDURE — 99214 OFFICE O/P EST MOD 30 MIN: CPT | Performed by: FAMILY MEDICINE

## 2021-09-21 RX ORDER — SILDENAFIL CITRATE 20 MG/1
20 TABLET ORAL PRN
COMMUNITY
End: 2022-01-04 | Stop reason: SDUPTHER

## 2021-09-21 ASSESSMENT — ENCOUNTER SYMPTOMS
DIARRHEA: 0
SHORTNESS OF BREATH: 0
CONSTIPATION: 0
BACK PAIN: 0
NAUSEA: 0
VOMITING: 0
COUGH: 0
WHEEZING: 0
ABDOMINAL PAIN: 0

## 2021-09-21 NOTE — PROGRESS NOTES
21  Jodie Heard : 1950 Sex: male  Age: 80 y.o. Chief Complaint   Patient presents with    Pre-op Exam     right total knee- dr Shaylee Aburto 10/18     HPI:  80 y.o. male presents today for pre-op examination. Patient's chart, medical, surgical and medication history all reviewed. Pre-Operative Risk assessment using 2014 ACC/AHA guidelines   Procedure: R TKA   Date of procedure:  10/18/21  Surgeon: Dr. Bard Lopez  Emergent procedure No  Active Cardiac Condition No (decompensated HF, Arrhythmia, MI <3 weeks, severe valve disease)  Risk Level of Procedure Intermediate Risk (intraperitoneal, intrathoracic, HENT, orthopedic, or carotid endarterectomy, etc.)  Revised Cardiac Risk Index Risk factors: History of heart failure  Measurement of Exercise Tolerance before Surgery >4 Yes    According to the 2014 ACC/AHA pre-operative risk assessment guidelines Jodie Heard is a low risk for major cardiac complications during a intermediate risk procedure and may continue as planned. Specific medication recommendations are listed below. Medications recommended to continue should be taken with a sip of water even when NPO. According to [facility] perioperative risk calculation, there is a 0.17% risk of myocardial infarction or cardiac arrest, intraoperatively or up to 30 days post-op. Further recommendations from consultants: None- patient already cleared by cardiology    Medication Recommendations:  Statins should be continued the day of surgery- after surgery  HOLD Aleve for 1 week prior to surgery- OK to use Tylenol      ROS:  Review of Systems   Constitutional: Negative for chills, fever and unexpected weight change. Respiratory: Negative for cough, shortness of breath and wheezing. Cardiovascular: Negative for chest pain and palpitations. Gastrointestinal: Negative for abdominal pain, constipation, diarrhea, nausea and vomiting. Musculoskeletal: Positive for arthralgias and gait problem.  Negative for back pain. Skin: Negative for rash. Neurological: Positive for dizziness. Negative for headaches. Psychiatric/Behavioral: Positive for sleep disturbance. Negative for dysphoric mood. The patient is not nervous/anxious. All other systems reviewed and are negative. Current Outpatient Medications on File Prior to Visit   Medication Sig Dispense Refill    sildenafil (REVATIO) 20 MG tablet Take 20 mg by mouth as needed      atorvastatin (LIPITOR) 20 MG tablet take 1 tablet by mouth every evening 90 tablet 1    meclizine (ANTIVERT) 25 MG tablet Take 1 tablet by mouth 3 times daily as needed for Dizziness 90 tablet 5    naproxen (NAPROSYN) 500 MG tablet Take 1 tablet by mouth 2 times daily (with meals) 20 tablet 1    senna (SENOKOT) 8.6 MG tablet Take 1 tablet by mouth      ibuprofen (ADVIL;MOTRIN) 800 MG tablet take 1 tablet by mouth every 8 hours if needed for pain  0    omeprazole (PRILOSEC) 20 MG capsule Take 20 mg by mouth every other day       vitamin D (ERGOCALCIFEROL) 1.25 MG (17406 UT) CAPS capsule take 1 capsule by mouth every week 12 capsule 1     No current facility-administered medications on file prior to visit.        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 Bruce Washington     Past Surgical History:   Procedure Laterality Date    CARDIAC CATHETERIZATION  08/19/2014    Normal    COLONOSCOPY  2013    Dr. Ronit Araujo- repeat 5 years    CYST INCISION AND DRAINAGE      Pilonidal    LIPOMA RESECTION Left 1970's    under breast; Dr. Andino Gone  09/17/2014    [place]    OTHER SURGICAL HISTORY  02/17/2017    Euflexxa Injection Right knee - Ginny Bi X3    RECTAL SURGERY      pilonidal cyst    TUMOR REMOVAL      lipoma on chest     Family History   Problem Relation Age of Onset    Heart Disease Mother         A fib    Atrial Fibrillation Mother     Heart Disease Father     Heart Attack Father     Other Brother         Hemachromatosis     Other Sister         MS     Social History     Socioeconomic History    Marital status:      Spouse name: Not on file    Number of children: Not on file    Years of education: Not on file    Highest education level: Not on file   Occupational History    Occupation: [employer], mows grass,etc   Tobacco Use    Smoking status: Never Smoker    Smokeless tobacco: Former User     Types: Chew    Tobacco comment: snuff user daily   Vaping Use    Vaping Use: Never used   Substance and Sexual Activity    Alcohol use: Yes     Comment: 5-6 beers a week    Drug use: No    Sexual activity: Not on file   Other Topics Concern    Not on file   Social History Narrative    Not on file     Social Determinants of Health     Financial Resource Strain:     Difficulty of Paying Living Expenses:    Food Insecurity:     Worried About [address] in the Last Year:     [address] [place] in the Last Year:    Transportation Needs:     Lack of Transportation (Medical):      Lack of Transportation (Non-Medical):    Physical Activity:     Days of Exercise per Week:     Minutes of Exercise per Session:    Stress:     Feeling of Stress :    Social Connections:     Frequency of Communication with Friends and Family:     Frequency of Social Gatherings with Friends and Family:     Attends Yazidi Services:     Active Member of Clubs or Organizations:     Attends Club or Organization Meetings:     Marital Status:    Intimate Partner Violence:     Fear of Current or Ex-Partner:     Emotionally Abused:     Physically Abused:     Sexually Abused:        Vitals:    09/21/21 1528   BP: 126/72   Pulse: 63   Temp: 97.3 °F (36.3 °C)   SpO2: 99%   Weight: 153 lb (69.4 kg)   Height: 5' 3\" (1.6 m)       Physical Exam:  Physical Exam  Vitals and nursing note reviewed. Constitutional:       General: He is not in acute distress. Appearance: Normal appearance. He is well-developed and normal weight. He is not ill-appearing. HENT:      Head: Normocephalic and atraumatic. Right Ear: Hearing and external ear normal.      Left Ear: Hearing and external ear normal.      Nose:      Comments: Patient wearing mask  Eyes:      General: Lids are normal. No scleral icterus. Extraocular Movements: Extraocular movements intact. Conjunctiva/sclera: Conjunctivae normal.   Neck:      Thyroid: No thyromegaly. Vascular: No carotid bruit. Cardiovascular:      Rate and Rhythm: Normal rate and regular rhythm. Heart sounds: Normal heart sounds. No murmur heard. Pulmonary:      Effort: Pulmonary effort is normal. No respiratory distress. Breath sounds: Normal breath sounds. No wheezing. Musculoskeletal:         General: No tenderness or deformity. Normal range of motion. Cervical back: Normal range of motion and neck supple. Right lower leg: No edema. Left lower leg: No edema. Lymphadenopathy:      Cervical: No cervical adenopathy. Skin:     General: Skin is warm and dry. Findings: No rash. Neurological:      General: No focal deficit present. Mental Status: He is alert and oriented to person, place, and time. Gait: Gait normal.   Psychiatric:         Mood and Affect: Mood and affect normal.         Speech: Speech normal.         Behavior: Behavior normal.         Thought Content:  Thought content normal.         Labs:  CBC with Differential:    Lab Results   Component Value Date    WBC 5.3 06/16/2021    RBC 4.49 06/16/2021    HGB 13.3 06/16/2021    HCT 42.2 06/16/2021     06/16/2021    MCV 94.0 06/16/2021    MCH 29.6 06/16/2021    MCHC 31.5 06/16/2021    RDW 13.2 06/16/2021    LYMPHOPCT 26.6 06/16/2021    MONOPCT 11.0 06/16/2021    BASOPCT 0.8 06/16/2021    MONOSABS 0.58 06/16/2021    LYMPHSABS 1.40 06/16/2021    EOSABS 0.26 06/16/2021    BASOSABS 0.04 06/16/2021     CMP:    Lab Results   Component Value Date     06/16/2021    K 4.8 06/16/2021     06/16/2021    CO2 20 06/16/2021    BUN 23 06/16/2021    CREATININE 1.2 06/16/2021    GFRAA >60 06/16/2021    LABGLOM 60 06/16/2021    GLUCOSE 77 06/16/2021    PROT 7.4 06/16/2021    LABALBU 4.2 06/16/2021    CALCIUM 9.9 06/16/2021    BILITOT 0.3 06/16/2021    ALKPHOS 66 06/16/2021    AST 21 06/16/2021    ALT 13 06/16/2021     U/A:    Lab Results   Component Value Date    COLORU Yellow 06/16/2021    PROTEINU Negative 06/16/2021    PHUR 5.5 06/16/2021    WBCUA NONE 06/16/2021    RBCUA NONE 06/16/2021    BACTERIA FEW 06/16/2021    CLARITYU CLOUDY 06/16/2021    SPECGRAV >=1.030 06/16/2021    LEUKOCYTESUR Negative 06/16/2021    UROBILINOGEN 0.2 06/16/2021    BILIRUBINUR Negative 06/16/2021    BLOODU Negative 06/16/2021    GLUCOSEU Negative 06/16/2021     HgBA1c:    Lab Results   Component Value Date    LABA1C 5.2 06/16/2021     FLP:    Lab Results   Component Value Date    TRIG 79 06/16/2021    HDL 55 06/16/2021    LDLCALC 140 06/16/2021    LABVLDL 16 06/16/2021     TSH:    Lab Results   Component Value Date TSH 3.580 06/16/2021     PSA:   Lab Results   Component Value Date    PSA 1.91 06/11/2020        Assessment and Plan:  Ricky Tucker was seen today for pre-op exam.    Diagnoses and all orders for this visit:    Primary osteoarthritis of right knee  Patient seen and cleared by cardiology. Medically optimized at this time for surgery. Perioperative medication administration reviewed. Pre-op examination    MVP (mitral valve prolapse)  Cleared by cardio    S/P MVR (mitral valve repair)    Need for influenza vaccination  -     INFLUENZA, QUADV, ADJUVANTED, 65 YRS =, IM, PF, PREFILL SYR, 0.5ML (FLUAD)        Return if symptoms worsen or fail to improve.       Seen By:  Joe Narayanan, DO

## 2021-09-21 NOTE — TELEPHONE ENCOUNTER
[facility] HEALTH CLINICAL PHARMACY REVIEW: ADHERENCE REVIEW  Identified care gap per [employer]; fills at [facility]: Statin adherence    Last Visit: 6/11/21    Patient also appears to be prescribed: ATORVASTATIN TAB 20MG     Patient found in Outcomes MTM and is not currently eligible for CMR/TIP    ASSESSMENT  STATIN ADHERENCE    Per Insurance Records through [employer] (2020 [facility] = 0%; YTD [place] = 76%; Potential Fail Date: 9/23/21):   ATORVASTATIN TAB 20MG last filled on 5/10/21 for 90 day supply. Next refill due: 8/8/21    Per Reconciled Dispense Report:  ATORVASTATIN TAB 20MG last filled on 8/2/21 for 90 day supply. Per [facility]:   ATORVASTATIN TAB 20MG last picked up on 5/10/21 for 90 day supply. 2 refills remaining.  Billed through [employer]     Per pharmacy, Atorvastatin was RTS on 8/2/21      Lab Results   Component Value Date    CHOL 211 (H) 06/16/2021    TRIG 79 06/16/2021    HDL 55 06/16/2021    LDLCALC 140 (H) 06/16/2021     ALT   Date Value Ref Range Status   06/16/2021 13 0 - 40 U/L Final     AST   Date Value Ref Range Status   06/16/2021 21 0 - 39 U/L Final     The 10-year ASCVD risk score (Melina Patino, et al., 2013) is: 17.4%    Values used to calculate the score:      Age: 80 years      Sex: Male      Is Non- : No      Diabetic: No      Tobacco smoker: No      Systolic Blood Pressure: 390 mmHg      Is BP treated: No      HDL Cholesterol: 55 mg/dL      Total Cholesterol: 211 mg/dL     PLAN  The following are interventions that have been identified:   - Patient overdue refilling Atorvastatin and active on home medication list.     Unable to leave message a woman answered and then hung up after introduction        Future Appointments   Date Time Provider Belkis Morrison   9/21/2021  3:30 PM DO SELAM Leal [facility]   10/4/2021  9:00 AM SHEREE BRIDGETTE CT [facility] Shaq R   12/17/2021  1:30 PM Kassandra Alvarez DO [facility] [address] [place] [address] [address] [place] [address]  // Department, toll free [phone], Option 2

## 2021-09-30 ENCOUNTER — TELEPHONE (OUTPATIENT)
Dept: ORTHOPEDIC SURGERY | Age: 71
End: 2021-09-30

## 2021-09-30 NOTE — TELEPHONE ENCOUNTER
Patient wishes to proceed with surgery scheduling     Surgery: Right Knee CRISPIN Assisted Total Joint Arthroplasty   OR: 10/18/2021  [place]   PNB block

## 2021-09-30 NOTE — TELEPHONE ENCOUNTER
Spoke with Valentine with [facility]   Clinical documentation submitted   Pending # [id number]

## 2021-10-01 NOTE — TELEPHONE ENCOUNTER
Noted fail date has passed - will gulshan for f/u on 1/1/22 to send to PCP to consider updating his prescription on file.     Miriam Paredes, PharmD, [place] // Department, toll free [phone], option 1

## 2021-10-01 NOTE — TELEPHONE ENCOUNTER
Spoke with and he stated he have a hard time with pills sometimes and takes his medication every 2 days instead of every evening. He would like the directions updated on his pill bottle.  He stated he does have quite a few pills remaining    Sending encounter to pharmacist to request an updated SIG

## 2021-10-04 ENCOUNTER — HOSPITAL ENCOUNTER (OUTPATIENT)
Dept: CT IMAGING | Age: 71
Discharge: HOME | End: 2021-10-06
Payer: MEDICARE

## 2021-10-04 DIAGNOSIS — M17.11 PRIMARY OSTEOARTHRITIS OF RIGHT KNEE: ICD-10-CM

## 2021-10-04 PROCEDURE — 73700 CT LOWER EXTREMITY W/O DYE: CPT

## 2021-10-11 ENCOUNTER — ANESTHESIA EVENT (OUTPATIENT)
Dept: OPERATING ROOM | Age: 71
End: 2021-10-11
Payer: MEDICARE

## 2021-10-11 ENCOUNTER — HOSPITAL ENCOUNTER (OUTPATIENT)
Dept: PREADMISSION TESTING | Age: 71
Discharge: HOME | End: 2021-10-11
Payer: MEDICARE

## 2021-10-11 VITALS
RESPIRATION RATE: 18 BRPM | SYSTOLIC BLOOD PRESSURE: 145 MMHG | OXYGEN SATURATION: 99 % | HEIGHT: 64 IN | DIASTOLIC BLOOD PRESSURE: 65 MMHG | HEART RATE: 57 BPM | WEIGHT: 151 LBS | BODY MASS INDEX: 25.78 KG/M2 | TEMPERATURE: 96.9 F

## 2021-10-11 DIAGNOSIS — Z01.818 PREOP TESTING: ICD-10-CM

## 2021-10-11 LAB
ANION GAP SERPL CALCULATED.3IONS-SCNC: 9 MMOL/L (ref 7–16)
BASOPHILS ABSOLUTE: 0.04 E9/L (ref 0–0.2)
BASOPHILS RELATIVE PERCENT: 0.8 % (ref 0–2)
BUN BLDV-MCNC: 17 MG/DL (ref 6–23)
CALCIUM SERPL-MCNC: 9.7 MG/DL (ref 8.6–10.2)
CHLORIDE BLD-SCNC: 105 MMOL/L (ref 98–107)
CO2: 26 MMOL/L (ref 22–29)
CREAT SERPL-MCNC: 1.2 MG/DL (ref 0.7–1.2)
EOSINOPHILS ABSOLUTE: 0.17 E9/L (ref 0.05–0.5)
EOSINOPHILS RELATIVE PERCENT: 3.5 % (ref 0–6)
GFR AFRICAN AMERICAN: >60
GFR NON-AFRICAN AMERICAN: 60 ML/MIN/1.73
GLUCOSE BLD-MCNC: 102 MG/DL (ref 74–99)
HCT VFR BLD CALC: 42.6 % (ref 37–54)
HEMOGLOBIN: 13.5 G/DL (ref 12.5–16.5)
IMMATURE GRANULOCYTES #: 0.01 E9/L
IMMATURE GRANULOCYTES %: 0.2 % (ref 0–5)
LYMPHOCYTES ABSOLUTE: 1.27 E9/L (ref 1.5–4)
LYMPHOCYTES RELATIVE PERCENT: 26.4 % (ref 20–42)
MCH RBC QN AUTO: 29.7 PG (ref 26–35)
MCHC RBC AUTO-ENTMCNC: 31.7 % (ref 32–34.5)
MCV RBC AUTO: 93.6 FL (ref 80–99.9)
MONOCYTES ABSOLUTE: 0.5 E9/L (ref 0.1–0.95)
MONOCYTES RELATIVE PERCENT: 10.4 % (ref 2–12)
NEUTROPHILS ABSOLUTE: 2.82 E9/L (ref 1.8–7.3)
NEUTROPHILS RELATIVE PERCENT: 58.7 % (ref 43–80)
PDW BLD-RTO: 13 FL (ref 11.5–15)
PLATELET # BLD: 366 E9/L (ref 130–450)
PMV BLD AUTO: 10.3 FL (ref 7–12)
POTASSIUM SERPL-SCNC: 4.6 MMOL/L (ref 3.5–5)
PREALBUMIN: 26 MG/DL (ref 20–40)
RBC # BLD: 4.55 E12/L (ref 3.8–5.8)
SODIUM BLD-SCNC: 140 MMOL/L (ref 132–146)
WBC # BLD: 4.8 E9/L (ref 4.5–11.5)

## 2021-10-11 PROCEDURE — 84134 ASSAY OF PREALBUMIN: CPT

## 2021-10-11 PROCEDURE — 85025 COMPLETE CBC W/AUTO DIFF WBC: CPT

## 2021-10-11 PROCEDURE — 36415 COLL VENOUS BLD VENIPUNCTURE: CPT

## 2021-10-11 PROCEDURE — 80048 BASIC METABOLIC PNL TOTAL CA: CPT

## 2021-10-11 PROCEDURE — 87081 CULTURE SCREEN ONLY: CPT

## 2021-10-11 RX ORDER — FENTANYL CITRATE 50 UG/ML
25 INJECTION, SOLUTION INTRAMUSCULAR; INTRAVENOUS PRN
Status: CANCELLED | OUTPATIENT
Start: 2021-10-11

## 2021-10-11 RX ORDER — LIDOCAINE HYDROCHLORIDE 10 MG/ML
10 INJECTION, SOLUTION INFILTRATION; PERINEURAL
Status: CANCELLED | OUTPATIENT
Start: 2021-10-11 | End: 2021-10-11

## 2021-10-11 RX ORDER — ROPIVACAINE HYDROCHLORIDE 5 MG/ML
30 INJECTION, SOLUTION EPIDURAL; INFILTRATION; PERINEURAL
Status: CANCELLED | OUTPATIENT
Start: 2021-10-11 | End: 2021-10-11

## 2021-10-11 RX ORDER — MIDAZOLAM HYDROCHLORIDE 2 MG/2ML
0.5 INJECTION, SOLUTION INTRAMUSCULAR; INTRAVENOUS PRN
Status: CANCELLED | OUTPATIENT
Start: 2021-10-11

## 2021-10-11 ASSESSMENT — KOOS JR
STRAIGHTENING KNEE FULLY: 1
TWISING OR PIVOTING ON KNEE: 4
STANDING UPRIGHT: 4
HOW SEVERE IS YOUR KNEE STIFFNESS AFTER FIRST WAKING IN MORNING: 4
RISING FROM SITTING: 3
GOING UP OR DOWN STAIRS: 3
BENDING TO THE FLOOR TO PICK UP OBJECT: 2

## 2021-10-11 ASSESSMENT — PAIN SCALES - GENERAL: PAINLEVEL_OUTOF10: 5

## 2021-10-11 ASSESSMENT — PAIN DESCRIPTION - PROGRESSION: CLINICAL_PROGRESSION: GRADUALLY WORSENING

## 2021-10-11 ASSESSMENT — PAIN DESCRIPTION - DESCRIPTORS: DESCRIPTORS: THROBBING;SHARP;CONSTANT

## 2021-10-11 ASSESSMENT — PAIN DESCRIPTION - LOCATION: LOCATION: KNEE

## 2021-10-11 ASSESSMENT — PAIN DESCRIPTION - PAIN TYPE: TYPE: CHRONIC PAIN

## 2021-10-11 ASSESSMENT — PAIN DESCRIPTION - FREQUENCY: FREQUENCY: CONTINUOUS

## 2021-10-11 ASSESSMENT — PAIN DESCRIPTION - ONSET: ONSET: AWAKENED FROM SLEEP

## 2021-10-11 ASSESSMENT — PAIN DESCRIPTION - ORIENTATION: ORIENTATION: RIGHT

## 2021-10-11 NOTE — ANESTHESIA PRE PROCEDURE
Department of Anesthesiology  Preprocedure Note       Name:  Petey Presley   Age:  80 y.o.  :  1950                                          MRN:  [id number]         Date:  10/11/2021      Surgeon: Jani Singh):  Melida Betts MD    Procedure: Procedure(s):  RIGHT KNEE TOTAL ROBOTIC CRISPIN ASSISTED ARTHROPLASTY   +++GRANT+++   ++PNB++    Medications prior to admission:   Prior to Admission medications    Medication Sig Start Date End Date Taking?  Authorizing Provider   sildenafil (REVATIO) 20 MG tablet Take 20 mg by mouth as needed   Yes Historical Provider, MD   atorvastatin (LIPITOR) 20 MG tablet take 1 tablet by mouth every evening 8/2/21 10/31/21 Yes Kassandra Alvarez DO   vitamin D (ERGOCALCIFEROL) 1.25 MG (65937 UT) CAPS capsule take 1 capsule by mouth every week 21  Yes Kassandra Alvarez DO   meclizine (ANTIVERT) 25 MG tablet Take 1 tablet by mouth 3 times daily as needed for Dizziness 21  Yes Kassandra Alvarez DO   naproxen (NAPROSYN) 500 MG tablet Take 1 tablet by mouth 2 times daily (with meals) 19  Yes Ryan Obando DO   senna (SENOKOT) 8.6 MG tablet Take 1 tablet by mouth   Yes Historical Provider, MD   omeprazole (PRILOSEC) 20 MG capsule Take 20 mg by mouth every other day    Yes Historical Provider, MD       Current medications:    Current Outpatient Medications   Medication Sig Dispense Refill    sildenafil (REVATIO) 20 MG tablet Take 20 mg by mouth as needed      atorvastatin (LIPITOR) 20 MG tablet take 1 tablet by mouth every evening 90 tablet 1    vitamin D (ERGOCALCIFEROL) 1.25 MG (75247 UT) CAPS capsule take 1 capsule by mouth every week 12 capsule 1    meclizine (ANTIVERT) 25 MG tablet Take 1 tablet by mouth 3 times daily as needed for Dizziness 90 tablet 5    naproxen (NAPROSYN) 500 MG tablet Take 1 tablet by mouth 2 times daily (with meals) 20 tablet 1    senna (SENOKOT) 8.6 MG tablet Take 1 tablet by mouth      omeprazole (PRILOSEC) 20 MG capsule Take 20 mg by mouth every other day        No current facility-administered medications for this encounter.        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 Clair Gambino       Past Surgical History:        Procedure Laterality Date    CARDIAC CATHETERIZATION  08/19/2014    Normal    COLONOSCOPY  2013    Dr. Hoskins Batch- repeat 5 years    CYST INCISION AND DRAINAGE      Pilonidal    LIPOMA RESECTION Left 1970's    under breast; Dr. Cris Lora  09/17/2014    [place]    OTHER SURGICAL HISTORY  02/17/2017    Euflexxa Injection Right knee - Jarred Nagelet X3       Social History:    Social History     Tobacco Use    Smoking status: Never Smoker    Smokeless tobacco: Former User     Types: Leopold Luster Tobacco comment: snuff user daily   Substance Use Topics    Alcohol use: Yes     Comment: 5-6 beers a week                                Counseling given: Not Answered  Comment: snuff user daily      Vital Signs (Current):   Vitals:    10/11/21 0837   BP: (!) 145/65   Pulse: 57   Resp: 18   Temp: 96.9 °F (36.1 °C)   TempSrc: Infrared   SpO2: 99%   Weight: 151 lb (68.5 kg)   Height: 5' 4\" (1.626 m)                                              BP Readings from Last 3 Encounters:   10/11/21 (!) 145/65   09/21/21 126/72   09/17/21 126/72       NPO Status:                                                                                 BMI:   Wt Readings from Last 3 Encounters:   10/11/21 151 lb (68.5 kg)   09/21/21 153 lb (69.4 kg)   09/17/21 154 lb 9.6 oz (70.1 kg)     Body mass index is 25.92 kg/m². CBC:   Lab Results   Component Value Date    WBC 4.8 10/11/2021    RBC 4.55 10/11/2021    HGB 13.5 10/11/2021    HCT 42.6 10/11/2021    MCV 93.6 10/11/2021    RDW 13.0 10/11/2021     10/11/2021       CMP:   Lab Results   Component Value Date     06/16/2021    K 4.8 06/16/2021     06/16/2021    CO2 20 06/16/2021    BUN 23 06/16/2021    CREATININE 1.2 06/16/2021    GFRAA >60 06/16/2021    LABGLOM 60 06/16/2021    GLUCOSE 77 06/16/2021    PROT 7.4 06/16/2021    CALCIUM 9.9 06/16/2021    BILITOT 0.3 06/16/2021    ALKPHOS 66 06/16/2021    AST 21 06/16/2021    ALT 13 06/16/2021       POC Tests: No results for input(s): POCGLU, POCNA, POCK, POCCL, POCBUN, POCHEMO, POCHCT in the last 72 hours.     Coags:   Lab Results   Component Value Date    PROTIME 13.0 09/17/2014    INR 1.2 09/17/2014    APTT 27.7 09/17/2014       HCG (If Applicable): No results found for: PREGTESTUR, PREGSERUM, HCG, HCGQUANT     ABGs: No results found for: PHART, PO2ART, LEE9OAZ, AEA6VEM, BEART, K9NABXKM     Type & Screen (If Applicable):  No results found for: LABABO, LABRH    Drug/Infectious Status (If Applicable):  No results found for: HIV, HEPCAB    COVID-19 Screening (If Applicable): No results found for: COVID19        Anesthesia Evaluation  Patient summary reviewed and Nursing notes reviewed no history of anesthetic complications:   Airway: Mallampati: II  TM distance: >3 FB   Neck ROM: full  Mouth opening: > = 3 FB Dental:    (+) upper dentures and lower dentures      Pulmonary:Negative Pulmonary ROS breath sounds clear to auscultation                             Cardiovascular:  Exercise tolerance: good (>4 METS),   (+) hypertension:, valvular problems/murmurs: MR, hyperlipidemia        Rhythm: regular  Rate: normal      Cleared by cardiology     Beta Blocker:  Not on Beta Blocker         Neuro/Psych:                ROS comment: vertigo GI/Hepatic/Renal:   (+) GERD: well controlled,           Endo/Other:    (+) : arthritis: OA., .                 Abdominal:             Vascular:   + DVT, . Other Findings:           Anesthesia Plan      general and spinal     ASA 3     (Prefers spinal and PNB.)        Anesthetic plan and risks discussed with patient.                       Mirta Mirza MD   10/11/2021

## 2021-10-11 NOTE — PROGRESS NOTES
[facility] PRE-ADMISSION TESTING INSTRUCTIONS    The Preadmission Testing patient is instructed accordingly using the following criteria (check applicable):    ARRIVAL INSTRUCTIONS:  [x] Parking the day of Surgery is located in the Main Entrance lot. Upon entering the door, make an immediate right to the surgery reception desk    [x] Bring photo ID and insurance card    [] Bring in a copy of Living will or Durable Power of  papers. [x] Please be sure to arrange for responsible adult to provide transportation to and from the hospital    [] Please arrange for responsible adult to be with you for the 24 hour period post procedure due to having anesthesia      GENERAL INSTRUCTIONS:    [x] Nothing by mouth after midnight, including gum, candy, mints or water    [x] You may brush your teeth, but do not swallow any water    [x] Take medications as instructed with 1-2 oz of water    [] Stop herbal supplements and vitamins 5 days prior to procedure    [] Follow preop dosing of blood thinners per physician instructions    [] Take 1/2 dose of evening insulin, but no insulin after midnight    [] No oral diabetic medications after midnight    [] If diabetic and have low blood sugar or feel symptomatic, take 1-2oz apple juice only    [] Bring inhalers day of surgery    [] Bring C-PAP/ Bi-Pap day of surgery    [] Bring urine specimen day of surgery    [] Shower or bath with soap, lather and rinse well, AM of Surgery, no lotion, powders or creams to surgical site    [] Follow bowel prep as instructed per surgeon    [] No tobacco products within 24 hours of surgery     [x] No alcohol or illegal drug use within 24 hours of surgery.     [x] Jewelry, body piercing's, eyeglasses, contact lenses and dentures are not permitted into surgery (bring cases)      [] Please do not wear any nail polish, make up or hair products on the day of surgery    [x] You can expect a call the business day prior to procedure to notify you if your arrival time changes    [x] If you receive a survey after surgery we would greatly appreciate your comments    [] Parent/guardian of a minor must accompany their child and remain on the premises  the entire time they are under our care     [] Pediatric patients may bring favorite toy, blanket or comfort item with them    [] A caregiver or family member must remain with the patient during their stay if they are mentally handicapped, have dementia, disoriented or unable to use a call light or would be a safety concern if left unattended    [x] Please notify surgeon if you develop any illness between now and time of surgery (cold, cough, sore throat, fever, nausea, vomiting) or any signs of infections  including skin, wounds, and dental.    [x]  The Outpatient Pharmacy is available to fill your prescription here on your day of surgery, ask your preop nurse for details    [] Other instructions    EDUCATIONAL MATERIALS PROVIDED:    [x] PAT Preoperative Education Packet/Booklet     [x] Medication List    [] Transfusion bracelet applied with instructions    [x] Shower with soap, lather and rinse well, and use CHG wipes provided the evening before surgery as instructed    [x] Incentive spirometer with instructions        Have you been tested for COVID  No           Have you been told you were positive for COVID No  Have you had any known exposure to someone that is positive for COVID No  Do you have a cough                   No              Do you have shortness of breath No                 Do you have a sore throat            No                Are you having chills                    No                Are you having muscle aches. No                    Please come to the hospital wearing a mask and have your significant other wear a mask as well. Both of you should check your temperature before leaving to come here,  if it is 100 or higher please call [phone] for instruction.

## 2021-10-12 ENCOUNTER — TELEPHONE (OUTPATIENT)
Dept: ORTHOPEDIC SURGERY | Age: 71
End: 2021-10-12

## 2021-10-12 LAB — MRSA CULTURE ONLY: NORMAL

## 2021-10-12 NOTE — TELEPHONE ENCOUNTER
Left message for pt to call the office to be rescheduled for his pre-operative appt, he will need to be seen in [facility] d/t missing appt today and surgery being Monday.

## 2021-10-13 ENCOUNTER — OFFICE VISIT (OUTPATIENT)
Dept: ORTHOPEDIC SURGERY | Age: 71
End: 2021-10-13
Payer: MEDICARE

## 2021-10-13 ENCOUNTER — TELEPHONE (OUTPATIENT)
Dept: ORTHOPEDIC SURGERY | Age: 71
End: 2021-10-13

## 2021-10-13 VITALS — BODY MASS INDEX: 25.78 KG/M2 | RESPIRATION RATE: 18 BRPM | WEIGHT: 151 LBS | HEIGHT: 64 IN

## 2021-10-13 DIAGNOSIS — M17.11 PRIMARY OSTEOARTHRITIS OF RIGHT KNEE: Primary | ICD-10-CM

## 2021-10-13 PROCEDURE — 99213 OFFICE O/P EST LOW 20 MIN: CPT | Performed by: NURSE PRACTITIONER

## 2021-10-13 NOTE — PROGRESS NOTES
Department of Orthopedic Surgery  Attending Pre-operative History and Physical        DIAGNOSIS: Right knee osteoarthritis    INDICATION: Failed conservative treatment    PROCEDURE: Right Sabas robotic assisted total knee arthroplasty    CHIEF COMPLAINT: Right knee pain    History Obtained From:  patient    HISTORY OF PRESENT ILLNESS:      The patient is a 80 y.o. male with significant past medical history of right knee osteoarthritis who presents with right knee pain. Patient is felt conservative treatment including series of cortisone injections, Visco supplement injections, over-the-counter medications, activity modification, and home exercises. He continues to have persistent pain recurrent swelling, stiffness that affects activities of daily living. For this reason he wishes to proceed with surgical intervention specifically a right Sabas robotic assisted total knee arthroplasty. The risks, benefits, and alternatives to the procedure were discussed at length with the patient and family members. Risks include but are not limited to infection, neurovascular injury, persistent pain, arthrofibrosis, failure of hardware, need for revision surgery, pulmonary embolism, and the risks of general anesthesia. Patient verbalized understanding of the risks and wishes to proceed.       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 Rita Mayes       Past Surgical History:        Procedure Laterality Date    CARDIAC CATHETERIZATION  08/19/2014    Normal    COLONOSCOPY  2013    Dr. Carine Barahona- repeat 5 years    CYST INCISION AND DRAINAGE      Pilonidal    LIPOMA RESECTION Left 1970's    under breast; Dr. Vitor Garcia  09/17/2014    [address] HISTORY  02/17/2017    Euflexxa Injection Right knee - Bertis Crape X3       Medications Prior to Admission:   Not in a hospital admission. Allergies:  Patient has no known allergies. History of allergic reaction to anesthesia:  No    Social History:   TOBACCO:   reports that he has never smoked. He has quit using smokeless tobacco.  His smokeless tobacco use included chew. ETOH:   reports current alcohol use. DRUGS:   reports no history of drug use.     Family History:       Problem Relation Age of Onset    Heart Disease Mother         A fib    Atrial Fibrillation Mother     Heart Disease Father     Heart Attack Father     Other Brother         Hemachromatosis     Other Sister         MS       REVIEW OF SYSTEMS:  CONSTITUTIONAL:  negative  RESPIRATORY:  negative  CARDIOVASCULAR:  negative  MUSCULOSKELETAL:  negative except for  HPI  NEUROLOGICAL:  negative    PHYSICAL EXAM:     VITALS:  Resp 18   Ht 5' 3.5\" (1.613 m)   Wt 151 lb (68.5 kg)   BMI 26.33 kg/m²     Gen: AOx3, NAD    Heart:  normal apical pulses, normal S1 and S2 and no edema    Lungs:  No increased work of breathing, good air exchange     Abdomen:  no scars, non-distended and non-tender    Extremities:  No clubbing, cyanosis, or edema    Musculoskeletal: Right knee exam range of motion 0-130, joint line tenderness present on palpation. No swelling or deformity visualized on inspection. Stable patella tracking midline with crepitus. Valgus varus exams were intact. DATA:  CBC:   Lab Results   Component Value Date    WBC 4.8 10/11/2021    RBC 4.55 10/11/2021    HGB 13.5 10/11/2021    HCT 42.6 10/11/2021    MCV 93.6 10/11/2021    MCH 29.7 10/11/2021    MCHC 31.7 10/11/2021    RDW 13.0 10/11/2021     10/11/2021    MPV 10.3 10/11/2021     BMP:    Lab Results   Component Value Date     10/11/2021    K 4.6 10/11/2021     10/11/2021    CO2 26 10/11/2021    BUN 17 10/11/2021    LABALBU 4.2 06/16/2021    CREATININE 1.2 10/11/2021    CALCIUM 9.7 10/11/2021    GFRAA >60 10/11/2021    LABGLOM 60 10/11/2021    GLUCOSE 102 10/11/2021       Radiology Review: No new imaging obtained today. Previous x-rays of the right knee were reviewed showing tricompartmental degenerative changes with bone-on-bone to the lateral compartment    ASSESSMENT AND PLAN:    1. Patient is a 80 y.o. male with above specified procedure planned right Sabas robotic assisted total knee arthroplasty with anesthesia    2. Procedure options, risks and benefits reviewed with patient. Patient expresses understanding and has signed consent form to proceed. 3.  Patient and family were provided with pre-op and post-op instructions, prescription medications, and any other supplied needed post op (slings, braces, etc.)    Alma Whitaker CNP  Orthopaedic Surgery   10/13/21  2:14 PM    Staff Addendum    I have seen and evaluated the patient and agree with the assessment and plan as documented by Alma Whitaker CNP. I have performed the key components of the history and physical examination and concur with the findings and plan, and have made changes where appropriate/necessary.           Reba Malik MD  [address]

## 2021-10-13 NOTE — TELEPHONE ENCOUNTER
Patient called NS to pre op visit on 10/12/2021, he called office x3 on 10/13/201. Patient was called and left a voicemail he needs to be seen to call office to set up appointment for Friday 10/15/2021, needs seen prior to 10/18/2021 surgery.     JESUSITA

## 2021-10-18 ENCOUNTER — ANESTHESIA (OUTPATIENT)
Dept: OPERATING ROOM | Age: 71
End: 2021-10-18
Payer: MEDICARE

## 2021-10-18 ENCOUNTER — APPOINTMENT (OUTPATIENT)
Dept: GENERAL RADIOLOGY | Age: 71
End: 2021-10-18
Attending: ORTHOPAEDIC SURGERY
Payer: MEDICARE

## 2021-10-18 ENCOUNTER — HOSPITAL ENCOUNTER (OUTPATIENT)
Age: 71
Setting detail: OBSERVATION
Discharge: HOME | End: 2021-10-19
Attending: ORTHOPAEDIC SURGERY | Admitting: ORTHOPAEDIC SURGERY
Payer: MEDICARE

## 2021-10-18 VITALS — SYSTOLIC BLOOD PRESSURE: 130 MMHG | DIASTOLIC BLOOD PRESSURE: 69 MMHG | OXYGEN SATURATION: 97 % | TEMPERATURE: 93.2 F

## 2021-10-18 DIAGNOSIS — Z01.818 PREOP TESTING: ICD-10-CM

## 2021-10-18 DIAGNOSIS — Z96.651 STATUS POST TOTAL KNEE REPLACEMENT, RIGHT: Primary | ICD-10-CM

## 2021-10-18 PROCEDURE — G0378 HOSPITAL OBSERVATION PER HR: HCPCS

## 2021-10-18 PROCEDURE — 3700000001 HC ADD 15 MINUTES (ANESTHESIA): Performed by: ORTHOPAEDIC SURGERY

## 2021-10-18 PROCEDURE — 2720000010 HC SURG SUPPLY STERILE: Performed by: ORTHOPAEDIC SURGERY

## 2021-10-18 PROCEDURE — 6360000002 HC RX W HCPCS: Performed by: NURSE PRACTITIONER

## 2021-10-18 PROCEDURE — 2580000003 HC RX 258: Performed by: NURSE PRACTITIONER

## 2021-10-18 PROCEDURE — 3600000015 HC SURGERY LEVEL 5 ADDTL 15MIN: Performed by: ORTHOPAEDIC SURGERY

## 2021-10-18 PROCEDURE — 51701 INSERT BLADDER CATHETER: CPT

## 2021-10-18 PROCEDURE — 88305 TISSUE EXAM BY PATHOLOGIST: CPT

## 2021-10-18 PROCEDURE — 6370000000 HC RX 637 (ALT 250 FOR IP): Performed by: NURSE PRACTITIONER

## 2021-10-18 PROCEDURE — C1713 ANCHOR/SCREW BN/BN,TIS/BN: HCPCS | Performed by: ORTHOPAEDIC SURGERY

## 2021-10-18 PROCEDURE — C1776 JOINT DEVICE (IMPLANTABLE): HCPCS | Performed by: ORTHOPAEDIC SURGERY

## 2021-10-18 PROCEDURE — 2500000003 HC RX 250 WO HCPCS: Performed by: NURSE PRACTITIONER

## 2021-10-18 PROCEDURE — 88311 DECALCIFY TISSUE: CPT

## 2021-10-18 PROCEDURE — 64447 NJX AA&/STRD FEMORAL NRV IMG: CPT | Performed by: ANESTHESIOLOGY

## 2021-10-18 PROCEDURE — 6360000002 HC RX W HCPCS: Performed by: ANESTHESIOLOGY

## 2021-10-18 PROCEDURE — 2580000003 HC RX 258: Performed by: NURSE ANESTHETIST, CERTIFIED REGISTERED

## 2021-10-18 PROCEDURE — 3600000005 HC SURGERY LEVEL 5 BASE: Performed by: ORTHOPAEDIC SURGERY

## 2021-10-18 PROCEDURE — 2500000003 HC RX 250 WO HCPCS: Performed by: NURSE ANESTHETIST, CERTIFIED REGISTERED

## 2021-10-18 PROCEDURE — 73560 X-RAY EXAM OF KNEE 1 OR 2: CPT

## 2021-10-18 PROCEDURE — 51798 US URINE CAPACITY MEASURE: CPT

## 2021-10-18 PROCEDURE — 7100000001 HC PACU RECOVERY - ADDTL 15 MIN: Performed by: ORTHOPAEDIC SURGERY

## 2021-10-18 PROCEDURE — 6360000002 HC RX W HCPCS: Performed by: NURSE ANESTHETIST, CERTIFIED REGISTERED

## 2021-10-18 PROCEDURE — 2500000003 HC RX 250 WO HCPCS: Performed by: ORTHOPAEDIC SURGERY

## 2021-10-18 PROCEDURE — 2500000003 HC RX 250 WO HCPCS: Performed by: ANESTHESIOLOGY

## 2021-10-18 PROCEDURE — 6360000002 HC RX W HCPCS: Performed by: ORTHOPAEDIC SURGERY

## 2021-10-18 PROCEDURE — 7100000000 HC PACU RECOVERY - FIRST 15 MIN: Performed by: ORTHOPAEDIC SURGERY

## 2021-10-18 PROCEDURE — 3700000000 HC ANESTHESIA ATTENDED CARE: Performed by: ORTHOPAEDIC SURGERY

## 2021-10-18 PROCEDURE — 97165 OT EVAL LOW COMPLEX 30 MIN: CPT

## 2021-10-18 PROCEDURE — 2709999900 HC NON-CHARGEABLE SUPPLY: Performed by: ORTHOPAEDIC SURGERY

## 2021-10-18 PROCEDURE — 27447 TOTAL KNEE ARTHROPLASTY: CPT | Performed by: ORTHOPAEDIC SURGERY

## 2021-10-18 PROCEDURE — 20985 CPTR-ASST DIR MS PX: CPT | Performed by: ORTHOPAEDIC SURGERY

## 2021-10-18 PROCEDURE — 97161 PT EVAL LOW COMPLEX 20 MIN: CPT

## 2021-10-18 DEVICE — CEMENT BNE 20ML 40GM ACRYL RESIN HI VISC RADPQ FAST SET: Type: IMPLANTABLE DEVICE | Site: KNEE | Status: FUNCTIONAL

## 2021-10-18 DEVICE — IMPLANT PATELLAR DIA29MM THK9MM X3 ASYMMETRIC TRIATHLON: Type: IMPLANTABLE DEVICE | Site: KNEE | Status: FUNCTIONAL

## 2021-10-18 DEVICE — BASEPLATE TIB SZ 4 KNEE TRITANIUM CEM PRI LO PROF TRIATHLON: Type: IMPLANTABLE DEVICE | Site: KNEE | Status: FUNCTIONAL

## 2021-10-18 DEVICE — IMPLANTABLE DEVICE: Type: IMPLANTABLE DEVICE | Site: KNEE | Status: FUNCTIONAL

## 2021-10-18 RX ORDER — SODIUM CHLORIDE 0.9 % (FLUSH) 0.9 %
10 SYRINGE (ML) INJECTION PRN
Status: DISCONTINUED | OUTPATIENT
Start: 2021-10-18 | End: 2021-10-19 | Stop reason: HOSPADM

## 2021-10-18 RX ORDER — EPHEDRINE SULFATE/0.9% NACL/PF 50 MG/5 ML
SYRINGE (ML) INTRAVENOUS PRN
Status: DISCONTINUED | OUTPATIENT
Start: 2021-10-18 | End: 2021-10-18 | Stop reason: SDUPTHER

## 2021-10-18 RX ORDER — ONDANSETRON 4 MG/1
4 TABLET, ORALLY DISINTEGRATING ORAL EVERY 8 HOURS PRN
Status: DISCONTINUED | OUTPATIENT
Start: 2021-10-18 | End: 2021-10-19 | Stop reason: HOSPADM

## 2021-10-18 RX ORDER — MIDAZOLAM HYDROCHLORIDE 2 MG/2ML
0.5 INJECTION, SOLUTION INTRAMUSCULAR; INTRAVENOUS PRN
Status: DISCONTINUED | OUTPATIENT
Start: 2021-10-18 | End: 2021-10-18 | Stop reason: HOSPADM

## 2021-10-18 RX ORDER — LIDOCAINE HYDROCHLORIDE 10 MG/ML
10 INJECTION, SOLUTION INFILTRATION; PERINEURAL
Status: COMPLETED | OUTPATIENT
Start: 2021-10-18 | End: 2021-10-18

## 2021-10-18 RX ORDER — DEXAMETHASONE SODIUM PHOSPHATE 4 MG/ML
INJECTION, SOLUTION INTRA-ARTICULAR; INTRALESIONAL; INTRAMUSCULAR; INTRAVENOUS; SOFT TISSUE PRN
Status: DISCONTINUED | OUTPATIENT
Start: 2021-10-18 | End: 2021-10-18

## 2021-10-18 RX ORDER — BUPIVACAINE HYDROCHLORIDE 7.5 MG/ML
INJECTION, SOLUTION INTRASPINAL PRN
Status: DISCONTINUED | OUTPATIENT
Start: 2021-10-18 | End: 2021-10-18 | Stop reason: SDUPTHER

## 2021-10-18 RX ORDER — MIDAZOLAM HYDROCHLORIDE 1 MG/ML
INJECTION INTRAMUSCULAR; INTRAVENOUS PRN
Status: DISCONTINUED | OUTPATIENT
Start: 2021-10-18 | End: 2021-10-18 | Stop reason: SDUPTHER

## 2021-10-18 RX ORDER — GABAPENTIN 300 MG/1
300 CAPSULE ORAL ONCE
Status: COMPLETED | OUTPATIENT
Start: 2021-10-18 | End: 2021-10-18

## 2021-10-18 RX ORDER — PROPOFOL 10 MG/ML
INJECTION, EMULSION INTRAVENOUS CONTINUOUS PRN
Status: DISCONTINUED | OUTPATIENT
Start: 2021-10-18 | End: 2021-10-18 | Stop reason: SDUPTHER

## 2021-10-18 RX ORDER — SODIUM CHLORIDE 9 MG/ML
25 INJECTION, SOLUTION INTRAVENOUS PRN
Status: DISCONTINUED | OUTPATIENT
Start: 2021-10-18 | End: 2021-10-19 | Stop reason: HOSPADM

## 2021-10-18 RX ORDER — OXYCODONE HYDROCHLORIDE AND ACETAMINOPHEN 5; 325 MG/1; MG/1
1 TABLET ORAL PRN
Status: DISCONTINUED | OUTPATIENT
Start: 2021-10-18 | End: 2021-10-18 | Stop reason: HOSPADM

## 2021-10-18 RX ORDER — KETOROLAC TROMETHAMINE 30 MG/ML
15 INJECTION, SOLUTION INTRAMUSCULAR; INTRAVENOUS ONCE
Status: COMPLETED | OUTPATIENT
Start: 2021-10-18 | End: 2021-10-18

## 2021-10-18 RX ORDER — OXYCODONE HYDROCHLORIDE 5 MG/1
5 TABLET ORAL EVERY 4 HOURS PRN
Status: DISCONTINUED | OUTPATIENT
Start: 2021-10-18 | End: 2021-10-19 | Stop reason: HOSPADM

## 2021-10-18 RX ORDER — GABAPENTIN 300 MG/1
CAPSULE ORAL
Status: DISPENSED
Start: 2021-10-18 | End: 2021-10-18

## 2021-10-18 RX ORDER — ROPIVACAINE HYDROCHLORIDE 5 MG/ML
INJECTION, SOLUTION EPIDURAL; INFILTRATION; PERINEURAL
Status: COMPLETED | OUTPATIENT
Start: 2021-10-18 | End: 2021-10-18

## 2021-10-18 RX ORDER — ACETAMINOPHEN 325 MG/1
650 TABLET ORAL EVERY 6 HOURS
Status: DISCONTINUED | OUTPATIENT
Start: 2021-10-18 | End: 2021-10-19 | Stop reason: HOSPADM

## 2021-10-18 RX ORDER — FENTANYL CITRATE 50 UG/ML
25 INJECTION, SOLUTION INTRAMUSCULAR; INTRAVENOUS PRN
Status: DISCONTINUED | OUTPATIENT
Start: 2021-10-18 | End: 2021-10-18 | Stop reason: HOSPADM

## 2021-10-18 RX ORDER — PROMETHAZINE HYDROCHLORIDE 25 MG/ML
6.25 INJECTION, SOLUTION INTRAMUSCULAR; INTRAVENOUS PRN
Status: DISCONTINUED | OUTPATIENT
Start: 2021-10-18 | End: 2021-10-18 | Stop reason: HOSPADM

## 2021-10-18 RX ORDER — DIPHENHYDRAMINE HYDROCHLORIDE 50 MG/ML
12.5 INJECTION INTRAMUSCULAR; INTRAVENOUS
Status: DISCONTINUED | OUTPATIENT
Start: 2021-10-18 | End: 2021-10-18 | Stop reason: HOSPADM

## 2021-10-18 RX ORDER — PHENYLEPHRINE HCL IN 0.9% NACL 1 MG/10 ML
SYRINGE (ML) INTRAVENOUS PRN
Status: DISCONTINUED | OUTPATIENT
Start: 2021-10-18 | End: 2021-10-18 | Stop reason: SDUPTHER

## 2021-10-18 RX ORDER — SODIUM CHLORIDE 9 MG/ML
INJECTION, SOLUTION INTRAVENOUS CONTINUOUS PRN
Status: DISCONTINUED | OUTPATIENT
Start: 2021-10-18 | End: 2021-10-18 | Stop reason: SDUPTHER

## 2021-10-18 RX ORDER — SODIUM CHLORIDE 0.9 % (FLUSH) 0.9 %
10 SYRINGE (ML) INJECTION EVERY 12 HOURS SCHEDULED
Status: DISCONTINUED | OUTPATIENT
Start: 2021-10-18 | End: 2021-10-19 | Stop reason: HOSPADM

## 2021-10-18 RX ORDER — FENTANYL CITRATE 50 UG/ML
INJECTION, SOLUTION INTRAMUSCULAR; INTRAVENOUS PRN
Status: DISCONTINUED | OUTPATIENT
Start: 2021-10-18 | End: 2021-10-18 | Stop reason: SDUPTHER

## 2021-10-18 RX ORDER — SODIUM CHLORIDE 0.9 % (FLUSH) 0.9 %
10 SYRINGE (ML) INJECTION EVERY 12 HOURS SCHEDULED
Status: DISCONTINUED | OUTPATIENT
Start: 2021-10-18 | End: 2021-10-18 | Stop reason: HOSPADM

## 2021-10-18 RX ORDER — KETOROLAC TROMETHAMINE 30 MG/ML
15 INJECTION, SOLUTION INTRAMUSCULAR; INTRAVENOUS EVERY 6 HOURS
Status: DISCONTINUED | OUTPATIENT
Start: 2021-10-18 | End: 2021-10-19 | Stop reason: HOSPADM

## 2021-10-18 RX ORDER — MIDAZOLAM HYDROCHLORIDE 1 MG/ML
INJECTION INTRAMUSCULAR; INTRAVENOUS
Status: DISPENSED
Start: 2021-10-18 | End: 2021-10-18

## 2021-10-18 RX ORDER — ACETAMINOPHEN 500 MG
TABLET ORAL
Status: DISPENSED
Start: 2021-10-18 | End: 2021-10-18

## 2021-10-18 RX ORDER — ACETAMINOPHEN 500 MG
1000 TABLET ORAL ONCE
Status: COMPLETED | OUTPATIENT
Start: 2021-10-18 | End: 2021-10-18

## 2021-10-18 RX ORDER — ROPIVACAINE HYDROCHLORIDE 5 MG/ML
30 INJECTION, SOLUTION EPIDURAL; INFILTRATION; PERINEURAL
Status: COMPLETED | OUTPATIENT
Start: 2021-10-18 | End: 2021-10-18

## 2021-10-18 RX ORDER — DEXAMETHASONE SODIUM PHOSPHATE 4 MG/ML
INJECTION, SOLUTION INTRA-ARTICULAR; INTRALESIONAL; INTRAMUSCULAR; INTRAVENOUS; SOFT TISSUE PRN
Status: DISCONTINUED | OUTPATIENT
Start: 2021-10-18 | End: 2021-10-18 | Stop reason: SDUPTHER

## 2021-10-18 RX ORDER — DEXAMETHASONE SODIUM PHOSPHATE 10 MG/ML
8 INJECTION, SOLUTION INTRAMUSCULAR; INTRAVENOUS ONCE
Status: DISCONTINUED | OUTPATIENT
Start: 2021-10-18 | End: 2021-10-18 | Stop reason: HOSPADM

## 2021-10-18 RX ORDER — SODIUM CHLORIDE 9 MG/ML
25 INJECTION, SOLUTION INTRAVENOUS PRN
Status: DISCONTINUED | OUTPATIENT
Start: 2021-10-18 | End: 2021-10-18 | Stop reason: HOSPADM

## 2021-10-18 RX ORDER — OXYCODONE HYDROCHLORIDE 5 MG/1
10 TABLET ORAL EVERY 4 HOURS PRN
Status: DISCONTINUED | OUTPATIENT
Start: 2021-10-18 | End: 2021-10-19 | Stop reason: HOSPADM

## 2021-10-18 RX ORDER — OMEPRAZOLE 20 MG/1
20 CAPSULE, DELAYED RELEASE ORAL EVERY OTHER DAY
Status: DISCONTINUED | OUTPATIENT
Start: 2021-10-18 | End: 2021-10-18 | Stop reason: CLARIF

## 2021-10-18 RX ORDER — KETOROLAC TROMETHAMINE 30 MG/ML
INJECTION, SOLUTION INTRAMUSCULAR; INTRAVENOUS
Status: DISPENSED
Start: 2021-10-18 | End: 2021-10-18

## 2021-10-18 RX ORDER — FENTANYL CITRATE 50 UG/ML
INJECTION, SOLUTION INTRAMUSCULAR; INTRAVENOUS
Status: DISPENSED
Start: 2021-10-18 | End: 2021-10-18

## 2021-10-18 RX ORDER — ATORVASTATIN CALCIUM 20 MG/1
20 TABLET, FILM COATED ORAL EVERY EVENING
Status: DISCONTINUED | OUTPATIENT
Start: 2021-10-18 | End: 2021-10-19 | Stop reason: HOSPADM

## 2021-10-18 RX ORDER — ASPIRIN 325 MG
325 TABLET, DELAYED RELEASE (ENTERIC COATED) ORAL 2 TIMES DAILY
Qty: 56 TABLET | Refills: 0 | Status: SHIPPED | OUTPATIENT
Start: 2021-10-18 | End: 2021-12-08

## 2021-10-18 RX ORDER — VANCOMYCIN HYDROCHLORIDE 1 G/20ML
INJECTION, POWDER, LYOPHILIZED, FOR SOLUTION INTRAVENOUS PRN
Status: DISCONTINUED | OUTPATIENT
Start: 2021-10-18 | End: 2021-10-18 | Stop reason: ALTCHOICE

## 2021-10-18 RX ORDER — LIDOCAINE HYDROCHLORIDE 10 MG/ML
INJECTION, SOLUTION INFILTRATION; PERINEURAL
Status: DISPENSED
Start: 2021-10-18 | End: 2021-10-18

## 2021-10-18 RX ORDER — ACETAMINOPHEN 325 MG/1
650 TABLET ORAL EVERY 6 HOURS
Status: DISCONTINUED | OUTPATIENT
Start: 2021-10-18 | End: 2021-10-18 | Stop reason: CLARIF

## 2021-10-18 RX ORDER — PANTOPRAZOLE SODIUM 40 MG/1
40 TABLET, DELAYED RELEASE ORAL EVERY OTHER DAY
Status: DISCONTINUED | OUTPATIENT
Start: 2021-10-19 | End: 2021-10-19 | Stop reason: HOSPADM

## 2021-10-18 RX ORDER — MEPERIDINE HYDROCHLORIDE 25 MG/ML
12.5 INJECTION INTRAMUSCULAR; INTRAVENOUS; SUBCUTANEOUS EVERY 10 MIN PRN
Status: DISCONTINUED | OUTPATIENT
Start: 2021-10-18 | End: 2021-10-18 | Stop reason: HOSPADM

## 2021-10-18 RX ORDER — FENTANYL CITRATE 50 UG/ML
25 INJECTION, SOLUTION INTRAMUSCULAR; INTRAVENOUS EVERY 5 MIN PRN
Status: DISCONTINUED | OUTPATIENT
Start: 2021-10-18 | End: 2021-10-18 | Stop reason: HOSPADM

## 2021-10-18 RX ORDER — ROPIVACAINE HYDROCHLORIDE 5 MG/ML
INJECTION, SOLUTION EPIDURAL; INFILTRATION; PERINEURAL
Status: DISPENSED
Start: 2021-10-18 | End: 2021-10-18

## 2021-10-18 RX ORDER — ONDANSETRON 2 MG/ML
4 INJECTION INTRAMUSCULAR; INTRAVENOUS EVERY 6 HOURS PRN
Status: DISCONTINUED | OUTPATIENT
Start: 2021-10-18 | End: 2021-10-19 | Stop reason: HOSPADM

## 2021-10-18 RX ORDER — SENNA AND DOCUSATE SODIUM 50; 8.6 MG/1; MG/1
1 TABLET, FILM COATED ORAL 2 TIMES DAILY
Status: DISCONTINUED | OUTPATIENT
Start: 2021-10-18 | End: 2021-10-19 | Stop reason: HOSPADM

## 2021-10-18 RX ORDER — SODIUM CHLORIDE 0.9 % (FLUSH) 0.9 %
10 SYRINGE (ML) INJECTION PRN
Status: DISCONTINUED | OUTPATIENT
Start: 2021-10-18 | End: 2021-10-18 | Stop reason: HOSPADM

## 2021-10-18 RX ORDER — OXYCODONE HYDROCHLORIDE 5 MG/1
5 TABLET ORAL EVERY 6 HOURS PRN
Qty: 28 TABLET | Refills: 0 | Status: SHIPPED | OUTPATIENT
Start: 2021-10-18 | End: 2021-10-25

## 2021-10-18 RX ORDER — MECLIZINE HCL 12.5 MG/1
25 TABLET ORAL 3 TIMES DAILY PRN
Status: DISCONTINUED | OUTPATIENT
Start: 2021-10-18 | End: 2021-10-19 | Stop reason: HOSPADM

## 2021-10-18 RX ADMIN — Medication 2000 MG: at 23:28

## 2021-10-18 RX ADMIN — MIDAZOLAM 1 MG: 1 INJECTION INTRAMUSCULAR; INTRAVENOUS at 06:57

## 2021-10-18 RX ADMIN — ROPIVACAINE HYDROCHLORIDE 30 ML: 5 INJECTION, SOLUTION EPIDURAL; INFILTRATION; PERINEURAL at 06:31

## 2021-10-18 RX ADMIN — KETOROLAC TROMETHAMINE 15 MG: 30 INJECTION, SOLUTION INTRAMUSCULAR at 12:02

## 2021-10-18 RX ADMIN — Medication 2000 MG: at 17:58

## 2021-10-18 RX ADMIN — TRANEXAMIC ACID 1000 MG: 1 INJECTION, SOLUTION INTRAVENOUS at 10:08

## 2021-10-18 RX ADMIN — Medication 2000 MG: at 06:50

## 2021-10-18 RX ADMIN — BUPIVACAINE HYDROCHLORIDE IN DEXTROSE 1.7 ML: 7.5 INJECTION, SOLUTION SUBARACHNOID at 06:59

## 2021-10-18 RX ADMIN — LIDOCAINE HYDROCHLORIDE 5 ML: 10 INJECTION, SOLUTION INFILTRATION; PERINEURAL at 06:24

## 2021-10-18 RX ADMIN — ASPIRIN 325 MG: 325 TABLET, COATED ORAL at 20:51

## 2021-10-18 RX ADMIN — SODIUM CHLORIDE 25 ML: 9 INJECTION, SOLUTION INTRAVENOUS at 06:10

## 2021-10-18 RX ADMIN — ASPIRIN 325 MG: 325 TABLET, COATED ORAL at 12:01

## 2021-10-18 RX ADMIN — DEXAMETHASONE SODIUM PHOSPHATE 10 MG: 4 INJECTION, SOLUTION INTRAMUSCULAR; INTRAVENOUS at 07:18

## 2021-10-18 RX ADMIN — SODIUM CHLORIDE: 9 INJECTION, SOLUTION INTRAVENOUS at 08:00

## 2021-10-18 RX ADMIN — KETOROLAC TROMETHAMINE 15 MG: 30 INJECTION, SOLUTION INTRAMUSCULAR at 06:05

## 2021-10-18 RX ADMIN — PROPOFOL 50 MCG/KG/MIN: 10 INJECTION, EMULSION INTRAVENOUS at 07:04

## 2021-10-18 RX ADMIN — Medication 50 MCG: at 08:32

## 2021-10-18 RX ADMIN — ROPIVACAINE HYDROCHLORIDE 30 ML: 5 INJECTION, SOLUTION EPIDURAL; INFILTRATION; PERINEURAL at 06:28

## 2021-10-18 RX ADMIN — Medication 10 MG: at 07:31

## 2021-10-18 RX ADMIN — TRANEXAMIC ACID 1000 MG: 1 INJECTION, SOLUTION INTRAVENOUS at 07:00

## 2021-10-18 RX ADMIN — SODIUM CHLORIDE: 9 INJECTION, SOLUTION INTRAVENOUS at 06:45

## 2021-10-18 RX ADMIN — ATORVASTATIN CALCIUM 20 MG: 20 TABLET, FILM COATED ORAL at 17:58

## 2021-10-18 RX ADMIN — DOCUSATE SODIUM 50MG AND SENNOSIDES 8.6MG 1 TABLET: 8.6; 5 TABLET, FILM COATED ORAL at 20:51

## 2021-10-18 RX ADMIN — Medication 50 MCG: at 07:53

## 2021-10-18 RX ADMIN — DOCUSATE SODIUM 50MG AND SENNOSIDES 8.6MG 1 TABLET: 8.6; 5 TABLET, FILM COATED ORAL at 12:01

## 2021-10-18 RX ADMIN — KETOROLAC TROMETHAMINE 15 MG: 30 INJECTION, SOLUTION INTRAMUSCULAR at 23:40

## 2021-10-18 RX ADMIN — ACETAMINOPHEN 650 MG: 325 TABLET ORAL at 17:58

## 2021-10-18 RX ADMIN — GABAPENTIN 300 MG: 300 CAPSULE ORAL at 06:07

## 2021-10-18 RX ADMIN — MIDAZOLAM HYDROCHLORIDE 1 MG: 1 INJECTION, SOLUTION INTRAMUSCULAR; INTRAVENOUS at 06:22

## 2021-10-18 RX ADMIN — ACETAMINOPHEN 650 MG: 325 TABLET ORAL at 12:01

## 2021-10-18 RX ADMIN — ACETAMINOPHEN 1000 MG: 500 TABLET ORAL at 06:06

## 2021-10-18 RX ADMIN — KETOROLAC TROMETHAMINE 15 MG: 30 INJECTION, SOLUTION INTRAMUSCULAR at 17:58

## 2021-10-18 RX ADMIN — FENTANYL CITRATE 50 MCG: 0.05 INJECTION, SOLUTION INTRAMUSCULAR; INTRAVENOUS at 06:22

## 2021-10-18 RX ADMIN — FENTANYL CITRATE 25 MCG: 50 INJECTION, SOLUTION INTRAMUSCULAR; INTRAVENOUS at 06:57

## 2021-10-18 ASSESSMENT — PULMONARY FUNCTION TESTS
PIF_VALUE: 1
PIF_VALUE: 0
PIF_VALUE: 1
PIF_VALUE: 0
PIF_VALUE: 1
PIF_VALUE: 0
PIF_VALUE: 1
PIF_VALUE: 0
PIF_VALUE: 1
PIF_VALUE: 1
PIF_VALUE: 0
PIF_VALUE: 1

## 2021-10-18 ASSESSMENT — PAIN SCALES - GENERAL
PAINLEVEL_OUTOF10: 0
PAINLEVEL_OUTOF10: 7
PAINLEVEL_OUTOF10: 0
PAINLEVEL_OUTOF10: 7
PAINLEVEL_OUTOF10: 7
PAINLEVEL_OUTOF10: 3
PAINLEVEL_OUTOF10: 7
PAINLEVEL_OUTOF10: 2
PAINLEVEL_OUTOF10: 3
PAINLEVEL_OUTOF10: 0
PAINLEVEL_OUTOF10: 7
PAINLEVEL_OUTOF10: 0
PAINLEVEL_OUTOF10: 0

## 2021-10-18 ASSESSMENT — PAIN DESCRIPTION - FREQUENCY: FREQUENCY: INTERMITTENT

## 2021-10-18 ASSESSMENT — PAIN DESCRIPTION - LOCATION: LOCATION: KNEE

## 2021-10-18 ASSESSMENT — PAIN DESCRIPTION - DESCRIPTORS
DESCRIPTORS: ACHING;DISCOMFORT;SORE
DESCRIPTORS: DISCOMFORT

## 2021-10-18 ASSESSMENT — PAIN DESCRIPTION - PAIN TYPE: TYPE: ACUTE PAIN;SURGICAL PAIN

## 2021-10-18 ASSESSMENT — PAIN DESCRIPTION - ORIENTATION: ORIENTATION: RIGHT

## 2021-10-18 ASSESSMENT — PAIN DESCRIPTION - PROGRESSION: CLINICAL_PROGRESSION: GRADUALLY WORSENING

## 2021-10-18 ASSESSMENT — PAIN - FUNCTIONAL ASSESSMENT
PAIN_FUNCTIONAL_ASSESSMENT: PREVENTS OR INTERFERES SOME ACTIVE ACTIVITIES AND ADLS
PAIN_FUNCTIONAL_ASSESSMENT: 0-10

## 2021-10-18 ASSESSMENT — PAIN DESCRIPTION - ONSET: ONSET: GRADUAL

## 2021-10-18 NOTE — PROGRESS NOTES
Physical Therapy    Facility/Department: [facility] SURGERY  Initial Assessment    NAME: Yoana Jim  : 1950  MRN: [id number]    Date of Service: 10/18/2021       REQUIRES PT FOLLOW UP: Yes       Patient Diagnosis(es): The primary encounter diagnosis was Status post total knee replacement, right. A diagnosis of Preop testing was also pertinent to this visit. 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 has a past surgical history that includes Colonoscopy (); Mitral valve replacement (2014); lipoma resection (Left, 's); cyst incision and drainage; Cardiac catheterization (2014); other surgical history (2017); and Total knee arthroplasty (Right, 10/18/2021). Evaluating Therapist: Magan Zuñiga PT     Referring Provider:  ROBBIE Antonio CNP    PT order : PT eval and treat     Room #:  616   DIAGNOSIS:  OA s/p R TKA 10/18/2021   PRECAUTIONS: falls, FWBAT     Social:  Pt lives with  Spouse  in a  1  floor plan  2  steps and  1  rails to enter. Prior to admission pt walked with  No AD. Has ww, cane      Initial Evaluation  Date:  10/18/2021  Treatment      Short Term/ Long Term   Goals   Was pt agreeable to Eval/treatment? yes      Does pt have pain? 1/10 R knee      Bed Mobility  Rolling: NT   Supine to sit:  SBA   Sit to supine:  SBA   Scooting:  SBA   S/I     Transfers Sit to stand:   Mod assist   Stand to sit: min assist   Stand pivot:  NT    SBA    Ambulation     3 feet side stepping with  ww  with  Mod assist   200  feet with ww  with  SBA        Stair negotiation: ascended and descended NT    4  steps with  1  rail with  SBA    LE ROM  AAROM R knee 5-90 degrees      LE strength  3-/ 5 R knee      AM- PAC RAW score   12/ 24            Pt is alert and Oriented x  3      Balance: Mod assist in stand due to R LE instability/knee buckling , high fall risk     Endurance: limited by R LE instability  Sensation : numbness R LE     Bed/Chair alarm: yes      ASSESSMENT  Pt displays functional ability as noted in the objective portion of this evaluation. Conditions Requiring Skilled Therapeutic Intervention:    [x]Decreased strength     [x]Decreased ROM  [x]Decreased functional mobility  [x]Decreased balance   [x]Decreased endurance   []Decreased posture  [x]Decreased sensation  []Decreased coordination   []Decreased vision  []Decreased safety awareness   [x]Increased pain         Treatment/Education:    Pt in bed upon arrival ; agreeable to PT. Pt reports some numbness  R LE. instructed in supine APs and QS . Cues for hand and foot placement with transfers. R knee instability requiring mod assist to maintain balance; pt with difficulty compensating. RTB for safety     Pt educated on fall risk,  LE exercises, safe and proper technique with mobility        Patient response to education:   Pt verbalized understanding Pt demonstrated skill Pt requires further education in this area    x With cues/assist  x       Comments:  Pt left in bed  after session, with call light in reach. Rehab potential is Good for reaching above PT goals. Pts/ family goals   1. Home     Patient and or family understand(s) diagnosis, prognosis, and plan of care. -  Yes     PLAN  PT care will be provided in accordance with the objectives noted above. Whenever appropriate, clear delegation orders will be provided for nursing staff. Exercises and functional mobility practice will be used as well as appropriate assistive devices or modalities to obtain goals. Patient and family education will also be administered as needed. PLAN OF CARE:    Current Treatment Recommendations     [x] Strengthening to improve independence with functional mobility   [x] ROM to improve independence with functional mobility   [x] Balance Training to improve static/dynamic balance and to reduce fall risk  [x] Endurance Training to improve activity tolerance during functional mobility   [x] Transfer Training to improve safety and independence with all functional transfers   [x] Gait Training to improve gait mechanics, endurance and assess need for appropriate assistive device  [x] Stair Training in preparation for safe discharge home and/or into the community   [] Positioning to prevent skin breakdown and contractures  [x] Safety and Education Training   [x] Patient/Caregiver Education   [] HEP  [] Other     Frequency of treatments will be BID x 3 days. Time in: 1554   Time out: 1610       Evaluation Time includes thorough review of current medical information, gathering information on past medical history/social history and prior level of function, completion of standardized testing/informal observation of tasks, assessment of data and education on plan of care and goals.     CPT codes:  [x] Low Complexity PT evaluation 93751  [] Moderate Complexity PT evaluation 21358  [] High Complexity PT evaluation 02239  [] PT Re-evaluation 28665  [] Gait training 44887  minutes  [] Therapeutic activities 51297  minutes  [] Therapeutic exercises 87954  minutes  [] Neuromuscular reeducation 82665  minutes       Gloria Black number:  PT 2152

## 2021-10-18 NOTE — ANESTHESIA PROCEDURE NOTES
Spinal Block    Start time: 10/18/2021 6:53 AM  End time: 10/18/2021 6:59 AM  Reason for block: primary anesthetic  Staffing  Performed: anesthesiologist   Anesthesiologist: Jorge Gamez MD  Preanesthetic Checklist  Completed: patient identified, IV checked, site marked, risks and benefits discussed, surgical consent, monitors and equipment checked, pre-op evaluation, timeout performed, anesthesia consent given, oxygen available and patient being monitored  Spinal Block  Patient position: sitting  Prep: ChloraPrep  Patient monitoring: cardiac monitor, continuous pulse ox, continuous capnometry and frequent blood pressure checks  Approach: midline  Location: L3/L4  Guidance: paresthesia technique  Provider prep: mask and sterile gloves  Local infiltration: lidocaine  Agent: bupivacaine  Dose: 1.7  Dose: 1.7  Needle  Needle type: Quincke   Needle gauge: 22 G  Needle length: 3.5 in  Needle insertion depth: 4 cm  Assessment  Sensory level: T8  Swirl obtained: Yes  CSF: clear  Attempts: 1  Hemodynamics: stable

## 2021-10-18 NOTE — PROGRESS NOTES
Occupational Therapy  OCCUPATIONAL THERAPY INITIAL EVALUATION      Date:10/18/2021  Patient Name: Andrea Chaudhry  MRN: [id number]  : 1950  Room: 6013/0301-U    OCCUPATIONAL THERAPY INITIAL EVALUATION    SILVINA Wong [place], [place]         HPKK:                                                  Patient Name: Andrea Chaudhry    MRN: [id number]    : 1950    Room: 5306/1097-T      Evaluating OT: Keyona Quinn OTR/L   [id number]      Referring Provider:ROBBIE Muniz CNP    Specific Provider Orders/Date:OT eval and treat 10/18/2021      Diagnosis: OA R Le    Surgery: Status post total knee replacement, right [Z96.651]  10/18/2021     Pertinent Medical History: DJD, arthritis,     Precautions:  Fall Risk, WBAT R LE,alarm      Assessment of current deficits    [x] Functional mobility  [x]ADLs  [x] Strength               []Cognition    [x] Functional transfers   [x] IADLs         [x] Safety Awareness   [x]Endurance    [] Fine Coordination              [x] Balance      [] Vision/perception   []Sensation     []Gross Motor Coordination  [] ROM  [] Delirium                   [] Motor Control     OT PLAN OF CARE   OT POC based on physician orders, patient diagnosis and results of clinical assessment    Frequency/Duration  2-4 days/wk for 2 weeks PRN   Specific OT Treatment Interventions to include:   ADL retraining/adapted techniques and AE recommendations to increase functional independence within precautions                    Energy conservation techniques to improve tolerance for selfcare routine   Functional transfer/mobility training/DME recommendations for increased independence, safety and fall prevention         Patient/family education to increase safety and functional independence             Environmental modifications for safe mobility and completion of ADLs                             Therapeutic activity to improve functional performance during ADLs. Therapeutic exercise to improve tolerance and functional strength for ADLs    Balance retraining/tolerance tasks for facilitation of postural control with dynamic challenges during ADLs . Positioning to improve functional independence    Recommended Adaptive Equipment: TBD     Home Living: Pt lives with wife, ranch with 2-3 steps/rail    Bathroom setup: tub/shower, grab bars    Equipment owned: walker, cane     Prior Level of Function: Independent  with ADLs , Independent  with IADLs; ambulated with no devie  Driving: yes      Pain Level: no pain ;   Cognition: A&O: 4/4;    Memory:  Good    Sequencing:  Good    Problem solving:  Fair+   Judgement/safety:  Fair +     Functional Assessment:  AM-PAC Daily Activity Raw Score: 16/24   Initial Eval Status  Date: 10/18/21 Treatment Status  Date: STGs = LTGs  Time frame: 10-14 days   Feeding Independent      Grooming SBA/set-up,seated  Decrease balance  Mod i   UB Dressing SBA/set-up   Mod I    LB Dressing Max A   Assist threading brief over feet and pulling up over hips   R LE numbness, buckling   SBA    Bathing Mod A   SBA    Toileting Assist with through hygiene   Independent    Bed Mobility  SBA  Supine <> sit   Mod i   Functional Transfers Mod A  Sit-stand from bed   Supervision    Functional Mobility Mod A,w/walker   R LE buckling, numbness with with balance and R LE stabilization   Supervision  with good tolerance    Balance Sitting:     Static:  SBA- EOB     Dynamic:Mod A   Standing:  Mod A   Independent/supervision    Activity Tolerance Fair+ with light activity   Good  with ADL activity    Visual/  Perceptual Glasses: yes                 Hand Dominance right   AROM (PROM) Strength Additional Info:    RUE  WFL WFL good  and wfl FMC/dexterity noted during ADL tasks       LUE WFL WFL good  and wfl FMC/dexterity noted during ADL tasks       Hearing: WFL   Sensation:  No c/o numbness or tingling   Tone: WFL   Edema: none observed     Comments: Upon arrival patient lying in bed . At end of session, patient returned to bed  with call light and phone within reach, all lines and tubes intact. *ALARM ON      Overall patient demonstrated  decreased independence and safety during completion of ADL/functional transfer/mobility tasks. Pt would benefit from continued skilled OT to increase safety and independence with completion of ADL/IADL tasks for functional independence and quality of life. Rehab Potential: good for established goals     Patient / Family Goal: return home      Patient and/or family were instructed on functional diagnosis, prognosis/goals and OT plan of care. Demonstrated  Good  understanding. Eval Complexity: Low    Time In: 1555  Time Out: 1410      Min Units   OT Eval Low 97165 x  1   OT Eval Medium 94742      OT Eval High 67628      OT Re-Eval [id number]       Therapeutic Ex 46630       Therapeutic Activities 38176       ADL/Self Care 06548       Orthotic Management 59317       Manual 19421     Neuro Re-Ed 80462       Non-Billable Time          Evaluation Time additionally includes thorough review of current medical information, gathering information on past medical history/social history and prior level of function, interpretation of standardized testing/informal observation of tasks, assessment of data and development of plan of care and goals.             Warren Long  OTR/L  OT [id number]

## 2021-10-18 NOTE — H&P
Department of Orthopedic Surgery  Attending Pre-operative History and Physical        DIAGNOSIS: Right knee osteoarthritis    INDICATION: Failed conservative treatment    PROCEDURE: Right Sabas robotic assisted total knee arthroplasty    CHIEF COMPLAINT: Right knee pain    History Obtained From:  patient    HISTORY OF PRESENT ILLNESS:      The patient is a 80 y.o. male with significant past medical history of right knee osteoarthritis who presents with right knee pain. Patient is felt conservative treatment including series of cortisone injections, Visco supplement injections, over-the-counter medications, activity modification, and home exercises. He continues to have persistent pain recurrent swelling, stiffness that affects activities of daily living. For this reason he wishes to proceed with surgical intervention specifically a right Sabas robotic assisted total knee arthroplasty. The risks, benefits, and alternatives to the procedure were discussed at length with the patient and family members. Risks include but are not limited to infection, neurovascular injury, persistent pain, arthrofibrosis, failure of hardware, need for revision surgery, pulmonary embolism, and the risks of general anesthesia. Patient verbalized understanding of the risks and wishes to proceed.       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 Joleen Moyer       Past Surgical History:        Procedure Laterality Date    CARDIAC CATHETERIZATION  08/19/2014    Normal    COLONOSCOPY  2013    Dr. Sonny Sexton- repeat 5 years    CYST INCISION AND DRAINAGE      Pilonidal    LIPOMA RESECTION Left 1970's    under breast; Dr. Nasreen Parks  09/17/2014    [facility]  [facility]    Euflexxa Injection Right knee - [facility] X3       Medications Prior to Admission:   Medications Prior to Admission: sildenafil (REVATIO) 20 MG tablet, Take 20 mg by mouth as needed  atorvastatin (LIPITOR) 20 MG tablet, take 1 tablet by mouth every evening  vitamin D (ERGOCALCIFEROL) 1.25 MG (71513 UT) CAPS capsule, take 1 capsule by mouth every week  meclizine (ANTIVERT) 25 MG tablet, Take 1 tablet by mouth 3 times daily as needed for Dizziness  naproxen (NAPROSYN) 500 MG tablet, Take 1 tablet by mouth 2 times daily (with meals)  senna (SENOKOT) 8.6 MG tablet, Take 1 tablet by mouth  omeprazole (PRILOSEC) 20 MG capsule, Take 20 mg by mouth every other day     Allergies:  Poison ivy extract    History of allergic reaction to anesthesia:  No    Social History:   TOBACCO:   reports that he has never smoked. He has quit using smokeless tobacco.  His smokeless tobacco use included chew. ETOH:   reports current alcohol use. DRUGS:   reports no history of drug use.     Family History:       Problem Relation Age of Onset    Heart Disease Mother         A fib    Atrial Fibrillation Mother     Heart Disease Father     Heart Attack Father     Other Brother         Hemachromatosis     Other Sister         MS       REVIEW OF SYSTEMS:  CONSTITUTIONAL: negative  RESPIRATORY:  negative  CARDIOVASCULAR:  negative  MUSCULOSKELETAL:  negative except for  HPI  NEUROLOGICAL:  negative    PHYSICAL EXAM:     VITALS:  BP (!) 151/81   Pulse 56   Resp 16   Ht 5' 4\" (1.626 m)   Wt 151 lb (68.5 kg)   SpO2 100%   BMI 25.92 kg/m²     Gen: AOx3, NAD    Heart:  normal apical pulses, normal S1 and S2 and no edema    Lungs:  No increased work of breathing, good air exchange     Abdomen:  no scars, non-distended and non-tender    Extremities:  No clubbing, cyanosis, or edema    Musculoskeletal: Right knee exam range of motion 0-130, joint line tenderness present on palpation. No swelling or deformity visualized on inspection. Stable patella tracking midline with crepitus. Valgus varus exams were intact. DATA:  CBC:   Lab Results   Component Value Date    WBC 4.8 10/11/2021    RBC 4.55 10/11/2021    HGB 13.5 10/11/2021    HCT 42.6 10/11/2021    MCV 93.6 10/11/2021    MCH 29.7 10/11/2021    MCHC 31.7 10/11/2021    RDW 13.0 10/11/2021     10/11/2021    MPV 10.3 10/11/2021     BMP:    Lab Results   Component Value Date     10/11/2021    K 4.6 10/11/2021     10/11/2021    CO2 26 10/11/2021    BUN 17 10/11/2021    LABALBU 4.2 06/16/2021    CREATININE 1.2 10/11/2021    CALCIUM 9.7 10/11/2021    GFRAA >60 10/11/2021    LABGLOM 60 10/11/2021    GLUCOSE 102 10/11/2021       Radiology Review: No new imaging obtained today. Previous x-rays of the right knee were reviewed showing tricompartmental degenerative changes with bone-on-bone to the lateral compartment    ASSESSMENT AND PLAN:    1. Patient is a 80 y.o. male with above specified procedure planned right Sabas robotic assisted total knee arthroplasty with anesthesia    2. Procedure options, risks and benefits reviewed with patient. Patient expresses understanding and has signed consent form to proceed.     3.  Patient and family were provided with pre-op and post-op instructions, prescription medications, and any other supplied needed post op (slings, braces, etc.)      Controlled substances monitoring: possible medication side effects, risk of tolerance and/or dependence, and alternative treatments discussed, no signs of potential drug abuse or diversion identified and OARRS report reviewed today- activity consistent with treatment plan. Update History & Physical     The patient's History and Physical was reviewed with the patient and there were no significant changes. I examined the patient and there were no significant changes from the previous History and Physical.     Plan: The risk, benefits, expected outcome, and alternative to the recommended procedure have been discussed with the patient. Patient understands and wants to proceed with the procedure. Aliya Castro, [address] [facility]  Orthopaedic Surgery   10/13/21  6:42 AM    Staff Addendum    I have seen and evaluated the patient and agree with the assessment and plan as documented by Aliya Castro CNP. I have performed the key components of the history and physical examination and concur with the findings and plan, and have made changes where appropriate/necessary.           Brina Victoria MD  [address] [place] [address]

## 2021-10-18 NOTE — OP NOTE
OPERATIVE REPORT    PATIENT:  Go Lemus  [id number]    DATE OF PROCEDURE:  10/18/21    SURGEON: Gulshan Mccloud MD    ASSISTANT:  Frank Mera DO; Joao Palacio CNP. CNP was necessary for positioning, prepping/draping, intra-operative assistance, and wound closure. His assistance expedited the procedure and decreased operating room time. PREOPERATIVE DIAGNOSIS: Degenerative joint disease , Right knee. POSTOPERATIVE DIAGNOSIS: Degenerative joint disease,  Rightknee. OPERATION: Sabas Robot Assisted Right total knee arthroplasty     ANESTHESIA: . spinal and adductor canal block    OPERATIVE INDICATIONS:  The patient is a 80 y.o. male with significant past medical history of right knee osteoarthritis who presents with right knee pain. Patient is felt conservative treatment including series of cortisone injections, Visco supplement injections, over-the-counter medications, activity modification, and home exercises. He continues to have persistent pain recurrent swelling, stiffness that affects activities of daily living. X-rays show end-stage degenerative changes of bilateral knees with valgus alignment and chondrocalcinosis. For this reason he wishes to proceed with surgical intervention specifically a right Sabas robotic assisted total knee arthroplasty. The risks, benefits, and alternatives to the procedure were discussed at length with the patient and family members. Risks include but are not limited to infection, neurovascular injury, persistent pain, arthrofibrosis, failure of hardware, need for revision surgery, pulmonary embolism, and the risks of general anesthesia. Patient verbalized understanding of the risks and wishes to proceed. OPERATIVE FINDINGS:   There was extensive eburnation of bone, and full thickness cartilage loss over the femoral and tibial condyles.       OPERATIVE PROCEDURE: After satisfactory spinal anesthesia, the patient was placed supine on the operative table.  A Bump was placed under the operative leg and a tourniquet was placed high on the operative thigh. The operative extremity was prepped and draped in sterile fashion. Prior to procedure start, a time out was performed including confirmation of operative site and operation to be performed. Antibiotic prophylaxis, 2 g Ancef was given prior to incision, as was 1 g of transexamic acid. The leg was exsanguinated with an Esmarch bandage. The tourniquet was inflated. An anterior midline incision was made centered about the patella. Dissection was carried down in the midline of the extensor mechanism where a medial parapatellar arthrotomy was made. The patella was everted and a free hand method was used to cut the patella after removal of osteophytes. The appropriate sized patellar cutting guide was placed, and found to be a size 29. The patella was then drilled. A patellar protector was placed and the patella was subluxed laterally. We then placed our femoral and tibial pins and assembled the array for the femur and tibia respectively. Femoral and tibial registration buttons were placed. Hip centering and identification of medial and lateral malleoli was performed. The knee was flexed. Appropriate points were registered on the femur followed by the tibia. All osteophytes were then removed. The knee was brought into extension. We noted native 2 to 3 degrees of valgus and -3 degrees of extension. Appropriate tension was placed on the medial and lateral compartments with sizing spoons and we captured our measurements with correction of deformity. This was repeated with the knee in 90 degrees of flexion. Appropriate adjustments were then made utilizing the [address] to plan for 18 mm gaps in extension and flexion. We then prepared to make our cuts. The knee was flexed. Medial and lateral retractors were placed. Utilizing the [facility] robot arm, we made femoral and tibial cuts.   All bone fragments were removed. A lamina  was placed in order to access the posterior aspect of the knee and remove osteophytes and remaining meniscal tissue. The knee was then flexed, and the appropriate size tibial tray was placed in the correct amount of external rotation. A size 4 proved to be best fit. This as pinned into place. The femoral trial was then placed, size 4, a 9mm trial polyethylene was placed on the tibial component, and the knee was reduced and taken through a range of motion. Range of motion was found to be excellent including 0 degrees at extension, and 140 degrees of flexion without tibial tray lift off. We noted that we were balanced nicely in extension and flexion based on the measurements on our CT image, 18 mm gaps in flexion and extension. The knee was then flexed again, and the femur was drilled. The tibial tower was placed and the tibia was punched. All trial components were then removed. The knee was thoroughly irrigated. With the knee in extension, bovie electrocautery was utilized to coagulate in the posteromedial and posterolateral gutters. Local anesthetic consisting of 1% lidocaine with epi, 1% lidocaine without epi, and 0.25% Marcaine was then placed into the posterior capsule and into the periosteum of the femur and tibia, and into the anterior capsule. 1 unit of high viscosity cement was mixed on the back table. The bony surfaces were dried and the knee was flexed. Cement was placed via spatula onto the tibial bone and pressed into the bone. The tibial component was then placed using a mallet. The final polyethylene was impacted into place. Cement was then placed along the femoral bone surface, and the femoral component posterior condyle was coated with a small amount of cement. The femoral component was then placed. Excess cement was removed and the components were impacted once again. The knee was then placed in extension with a bump under the knee. The patella was everted, surface dried, and poly component was cemented into place and clamped. Once the cement hardened, the clamp was removed and the knee was irrigated. The tourniquet was released and bleeders were coagulated. A Betadine shock was performed for 3 minutes. We assessed range of motion and stability once again and noted full extension, flexion 140 degrees without significant tightness in good posterior drawer, as well as symmetric gaps on our CT model. Irrigated once more. The joint capsule was then closed using an O stratafix. 2-O interrupted vicryl suture was used to close the subcutanseous tissue. Finally, a 4-O running subcuticular monocryl suture was used to closed skin. Dermabond was then placed over the incision. A sterile dressing was placed. The patient was then transferred to their hospital bed, awoken from anesthesia, and transported to the PACU in stable condition. IMPLANTS:   Implant Name Type Inv.  Item Serial No.  Lot No. LRB No. Used Action   IMPLANT PATELLAR [id number] THK9MM X3 ASYMMETRIC TRIATHLON  IMPLANT PATELLAR [id number] THK9MM X3 ASYMMETRIC TRIATHLON  [employer] [facility] [id number] Right 1 Implanted   INSERT TIB BEAR SZ 4 THK9MM KNEE X3 CRUCE RET TRIATHLON  INSERT TIB BEAR SZ 4 THK9MM KNEE X3 CRUCE RET TRIATHLON  [employer] [facility] [id number] Right 1 Implanted   BASEPLATE TIB SZ 4 KNEE TRITANIUM HAWA FATOU LO PROF TRIATHLON  BASEPLATE TIB SZ 4 KNEE TRITANIUM HAWA FATOU LO PROF TRIATHLON  [employer] [facility] [id number] Right 1 Implanted   COMPONENT FEM SZ 4 R ANT KNEE CRUCE RET HAWA REFERENCING  COMPONENT FEM SZ 4 R ANT KNEE CRUCE RET HAWA REFERENCING  [employer] [facility] [id number] Right 1 Implanted   CEMENT BNE 20ML 40GM ACRYL RESIN HI VISC RADPQ FAST SET  CEMENT BNE 20ML 40GM ACRYL RESIN HI VISC RADPQ FAST SET  [employer] [facility] [id number] Right 2 Implanted       TOURNIQUET TIME: 58 minutes    ESTIMATED BLOOD LOSS: 20 mL    COMPLICATIONS: none    POST OPERATIVE PLAN: The patient will be transferred to the orthopaedic floor from PACU once stable. Post operative antibiotics will be continued for 24 hours. Physical therapy will begin immediately, with weight bearing as tolerated. DVT prophylaxis will be with SCD's starting today, and ASA  BID starting tomorrow. I anticipate discharge to home/rehab within the first 3 post operative days.        Zack Joshi MD  Orthopaedic Surgery   10/18/21  8:31 AM

## 2021-10-18 NOTE — ANESTHESIA PROCEDURE NOTES
Peripheral Block    Patient location during procedure: procedure area  Start time: 10/18/2021 6:20 AM  End time: 10/18/2021 6:31 AM  Staffing  Performed: anesthesiologist   Anesthesiologist: Wenceslao Diana MD  Preanesthetic Checklist  Completed: patient identified, IV checked, site marked, risks and benefits discussed, surgical consent, monitors and equipment checked, pre-op evaluation, timeout performed, anesthesia consent given, oxygen available and patient being monitored  Peripheral Block  Patient position: supine  Prep: ChloraPrep  Patient monitoring: IV access, frequent blood pressure checks, continuous pulse ox and cardiac monitor  Block type: Saphenous  Laterality: right  Injection technique: single-shot  Guidance: ultrasound guided  Local infiltration: lidocaine  Infiltration strength: 1 %  Dose: 5 mL  Provider prep: mask and sterile gloves  Local infiltration: lidocaine  Needle  Needle type: combined needle/nerve stimulator   Needle gauge: 20 G  Needle length: 10 cm  Needle localization: ultrasound guidance  Needle insertion depth: 4 cm  Assessment  Injection assessment: local visualized surrounding nerve on ultrasound and negative aspiration for heme  Paresthesia pain: none  Slow fractionated injection: yes  Hemodynamics: stable  Medications Administered  Ropivacaine (NAROPIN) injection 0.5%, 30 mL  Reason for block: post-op pain management and at surgeon's request

## 2021-10-18 NOTE — PROGRESS NOTES
Patient denies pain. Able to dorsiflex toes bilaterally- starting to feel sensation to BLE. Nurse to nurse report called to [facility] 157. Patient is dry- due to void.

## 2021-10-18 NOTE — PROGRESS NOTES
Pt post op DTV, bladder scanned for >200cc. Straight cath x1 for 550cc katya yellow urine. Pt new DTV time 8013-7299.

## 2021-10-18 NOTE — ANESTHESIA POSTPROCEDURE EVALUATION
Department of Anesthesiology  Postprocedure Note    Patient: Raghav Millan  MRN: [id number]  YOB: 1950  Date of evaluation: 10/18/2021  Time:  1:00 PM     Procedure Summary     Date: 10/18/21 Room / Location: [facility] OR 03 / [facility]    Anesthesia Start: 1214 Anesthesia Stop: 1275    Procedure: RIGHT KNEE TOTAL ROBOTIC CRISPIN ASSISTED ARTHROPLASTY  PNB (Right Knee) Diagnosis: (RIGHT KNEE OSTEOARTHRITIS)    Surgeons: Roxane Claude, MD Responsible Provider: Haydee Haq MD    Anesthesia Type: general, spinal ASA Status: 3          Anesthesia Type: general, spinal    Lucy Phase I: Lucy Score: 10    Lucy Phase II:      Last vitals: Reviewed and per EMR flowsheets.        Anesthesia Post Evaluation    Patient location during evaluation: PACU  Patient participation: complete - patient participated  Level of consciousness: awake and alert  Airway patency: patent  Nausea & Vomiting: no vomiting and no nausea  Complications: no  Cardiovascular status: blood pressure returned to baseline  Respiratory status: acceptable  Hydration status: euvolemic

## 2021-10-19 VITALS
RESPIRATION RATE: 18 BRPM | SYSTOLIC BLOOD PRESSURE: 151 MMHG | HEIGHT: 64 IN | HEART RATE: 58 BPM | WEIGHT: 151 LBS | OXYGEN SATURATION: 100 % | BODY MASS INDEX: 25.78 KG/M2 | DIASTOLIC BLOOD PRESSURE: 66 MMHG | TEMPERATURE: 97.3 F

## 2021-10-19 PROCEDURE — 97530 THERAPEUTIC ACTIVITIES: CPT

## 2021-10-19 PROCEDURE — 6370000000 HC RX 637 (ALT 250 FOR IP): Performed by: NURSE PRACTITIONER

## 2021-10-19 PROCEDURE — 97535 SELF CARE MNGMENT TRAINING: CPT

## 2021-10-19 PROCEDURE — 6360000002 HC RX W HCPCS: Performed by: NURSE PRACTITIONER

## 2021-10-19 PROCEDURE — 96374 THER/PROPH/DIAG INJ IV PUSH: CPT

## 2021-10-19 PROCEDURE — 2580000003 HC RX 258: Performed by: NURSE PRACTITIONER

## 2021-10-19 PROCEDURE — G0378 HOSPITAL OBSERVATION PER HR: HCPCS

## 2021-10-19 PROCEDURE — 96376 TX/PRO/DX INJ SAME DRUG ADON: CPT

## 2021-10-19 PROCEDURE — 97110 THERAPEUTIC EXERCISES: CPT

## 2021-10-19 RX ADMIN — KETOROLAC TROMETHAMINE 15 MG: 30 INJECTION, SOLUTION INTRAMUSCULAR at 11:28

## 2021-10-19 RX ADMIN — OXYCODONE 5 MG: 5 TABLET ORAL at 09:01

## 2021-10-19 RX ADMIN — KETOROLAC TROMETHAMINE 15 MG: 30 INJECTION, SOLUTION INTRAMUSCULAR at 05:41

## 2021-10-19 RX ADMIN — ACETAMINOPHEN 650 MG: 325 TABLET ORAL at 00:07

## 2021-10-19 RX ADMIN — SODIUM CHLORIDE, PRESERVATIVE FREE 10 ML: 5 INJECTION INTRAVENOUS at 09:02

## 2021-10-19 RX ADMIN — ACETAMINOPHEN 650 MG: 325 TABLET ORAL at 11:29

## 2021-10-19 RX ADMIN — ASPIRIN 325 MG: 325 TABLET, COATED ORAL at 09:01

## 2021-10-19 RX ADMIN — DOCUSATE SODIUM 50MG AND SENNOSIDES 8.6MG 1 TABLET: 8.6; 5 TABLET, FILM COATED ORAL at 09:01

## 2021-10-19 RX ADMIN — PANTOPRAZOLE SODIUM 40 MG: 40 TABLET, DELAYED RELEASE ORAL at 09:01

## 2021-10-19 RX ADMIN — OXYCODONE 5 MG: 5 TABLET ORAL at 14:08

## 2021-10-19 RX ADMIN — OXYCODONE 5 MG: 5 TABLET ORAL at 03:40

## 2021-10-19 RX ADMIN — ACETAMINOPHEN 650 MG: 325 TABLET ORAL at 05:40

## 2021-10-19 ASSESSMENT — PAIN DESCRIPTION - LOCATION
LOCATION: KNEE

## 2021-10-19 ASSESSMENT — PAIN SCALES - GENERAL
PAINLEVEL_OUTOF10: 0
PAINLEVEL_OUTOF10: 5
PAINLEVEL_OUTOF10: 7
PAINLEVEL_OUTOF10: 0
PAINLEVEL_OUTOF10: 7
PAINLEVEL_OUTOF10: 6
PAINLEVEL_OUTOF10: 4
PAINLEVEL_OUTOF10: 0
PAINLEVEL_OUTOF10: 4
PAINLEVEL_OUTOF10: 5
PAINLEVEL_OUTOF10: 6

## 2021-10-19 ASSESSMENT — PAIN DESCRIPTION - DESCRIPTORS
DESCRIPTORS: ACHING;DISCOMFORT;SORE
DESCRIPTORS: ACHING;DISCOMFORT;SORE
DESCRIPTORS: ACHING;DISCOMFORT

## 2021-10-19 ASSESSMENT — PAIN - FUNCTIONAL ASSESSMENT
PAIN_FUNCTIONAL_ASSESSMENT: PREVENTS OR INTERFERES SOME ACTIVE ACTIVITIES AND ADLS
PAIN_FUNCTIONAL_ASSESSMENT: ACTIVITIES ARE NOT PREVENTED
PAIN_FUNCTIONAL_ASSESSMENT: ACTIVITIES ARE NOT PREVENTED

## 2021-10-19 ASSESSMENT — PAIN DESCRIPTION - ONSET
ONSET: ON-GOING
ONSET: ON-GOING

## 2021-10-19 ASSESSMENT — PAIN DESCRIPTION - FREQUENCY
FREQUENCY: CONTINUOUS
FREQUENCY: INTERMITTENT

## 2021-10-19 ASSESSMENT — PAIN DESCRIPTION - ORIENTATION
ORIENTATION: RIGHT

## 2021-10-19 ASSESSMENT — PAIN DESCRIPTION - PROGRESSION
CLINICAL_PROGRESSION: GRADUALLY WORSENING
CLINICAL_PROGRESSION: RAPIDLY IMPROVING

## 2021-10-19 ASSESSMENT — PAIN DESCRIPTION - PAIN TYPE
TYPE: ACUTE PAIN;SURGICAL PAIN
TYPE: ACUTE PAIN;SURGICAL PAIN
TYPE: SURGICAL PAIN

## 2021-10-19 NOTE — PROGRESS NOTES
CLINICAL PHARMACY NOTE: MEDS TO BEDS    Total # of Prescriptions Filled: 1   The following medications were delivered to the patient:  · Oxycodone 5    Additional Documentation:

## 2021-10-19 NOTE — PLAN OF CARE
Problem: Falls - Risk of:  Goal: Will remain free from falls  10/19/2021 0810 by Alex Reese RN  Outcome: Met This Shift  10/19/2021 0809 by Alex Reese RN  Outcome: Met This Shift  10/19/2021 0053 by Maco Rolle RN  Outcome: Met This Shift  Goal: Absence of physical injury  10/19/2021 0810 by Alex Reese RN  Outcome: Met This Shift  10/19/2021 0809 by Alex Reese RN  Outcome: Met This Shift  10/19/2021 0053 by Maco Rolle RN  Outcome: Met This Shift     Problem: Pain:  Description: Pain management should include both nonpharmacologic and pharmacologic interventions. Goal: Pain level will decrease  10/19/2021 0810 by Alex Reese RN  Outcome: Met This Shift  10/19/2021 0809 by Alex Reese RN  Outcome: Met This Shift  10/19/2021 0053 by Maco Rolle RN  Outcome: Met This Shift  Goal: Control of acute pain  10/19/2021 0810 by Alex Reese RN  Outcome: Met This Shift  10/19/2021 0809 by Alex Reese RN  Outcome: Met This Shift  10/19/2021 0053 by Maco Rolle RN  Outcome: Met This Shift  Goal: Control of chronic pain  10/19/2021 0810 by Alex Reese RN  Outcome: Met This Shift  10/19/2021 0809 by Alex Reese RN  Outcome: Met This Shift  10/19/2021 0053 by Maco Rolle RN  Outcome: Met This Shift     Problem:  Activity:  Goal: Ability to tolerate increased activity will improve  10/19/2021 0810 by Alex Reese RN  Outcome: Met This Shift  10/19/2021 0809 by Alex Reese RN  Outcome: Met This Shift     Problem: Cardiac:  Goal: Hemodynamic stability will improve  10/19/2021 0810 by Alex Reese RN  Outcome: Met This Shift  10/19/2021 0809 by Alex Reese RN  Outcome: Met This Shift  Goal: Complications related to the disease process, condition or treatment will be avoided or minimized  10/19/2021 0810 by Alex Reese RN  Outcome: Met This Shift  10/19/2021 0809 by Alex Reese RN  Outcome: Met This Shift  Goal: Cerebral tissue perfusion will improve  10/19/2021 0810 by Alex Reese RN  Outcome: Met This Shift  10/19/2021 0809 by Faith Benjamin RN  Outcome: Met This Shift     Problem: Coping:  Goal: Ability to identify and develop effective coping behavior will improve  10/19/2021 0810 by Faith Benjamin RN  Outcome: Met This Shift  10/19/2021 0809 by Faith Benjamin RN  Outcome: Met This Shift     Problem: Health Behavior:  Goal: Identification of resources available to assist in meeting health care needs will improve  10/19/2021 0810 by Faith Benjamin RN  Outcome: Met This Shift  10/19/2021 0809 by Faith Benjamin RN  Outcome: Met This Shift     Problem: Nutritional:  Goal: Ability to identify appropriate dietary choices will improve  10/19/2021 0810 by Faith Benjamin RN  Outcome: Met This Shift  10/19/2021 0809 by Faith Benjamin RN  Outcome: Met This Shift     Problem: Discharge Planning:  Goal: Discharged to appropriate level of care  Outcome: Met This Shift     Problem: Mobility - Impaired:  Goal: Mobility will improve  Outcome: Met This Shift     Problem: Infection - Surgical Site:  Goal: Will show no infection signs and symptoms  Outcome: Met This Shift     Problem: Pain - Acute:  Goal: Pain level will decrease  10/19/2021 0810 by Faith Benjamin RN  Outcome: Met This Shift  10/19/2021 0809 by Faith Benjamin RN  Outcome: Met This Shift  10/19/2021 0053 by Achilles Claude, RN  Outcome: Met This Shift

## 2021-10-19 NOTE — DISCHARGE SUMMARY
Physician Discharge Summary     Patient ID:  Angelito Ingram  [id number]  80 y.o.  1950    Admit date: 10/18/2021    Discharge date and time: 10/19/2021  4:47 PM     Admitting Physician: Jessica Siu MD     Discharge Physician: Yoana Martinez MD    Admission Diagnoses: Status post total knee replacement, right [Z96.651]    Discharge Diagnoses: Status post total knee replacement, right [Z96.651]    Admission Condition: good    Discharged Condition: good    Surgery: Right Sabas robotic assisted total knee arthroplasty    Hospital Course: The patient was admitted for elective joint replacement. The patient underwent routine right Sabas robotic assisted total knee arthroplasty with anesthesia and was transferred to the orthopaedic floor from PACU following surgery. The post operative hospital course was unremarkable. The patient worked with PT/OT daily to improve mobility. Pain was controlled and DVT prophylaxis was with SCD's and  BID. The patient was discharged on POD 1 to home. Consults: PCP, PT/OT, Case management     Significant Diagnostic Studies:   Post operative xray showed components in good position     Treatments:   IV hydration  antibiotics: Ancef perioperatively for 24 hours  analgesia: oral and IV narcotics; toradol  anticoagulation:  BID  therapies: PT, OT and SW   surgery: as above     Discharge Exam:  Operative limb incision was clean, dry, and intact. Leg swelling was minimal.  Motor and sensory were intact throughout the operative leg. Disposition: home    Patient Instructions:   Current Discharge Medication List        START taking these medications    Details   oxyCODONE (ROXICODONE) 5 MG immediate release tablet Take 1 tablet by mouth every 6 hours as needed for Pain for up to 7 days. Intended supply: 7 days.  Take lowest dose possible to manage pain  Qty: 28 tablet, Refills: 0    Comments: Reduce doses taken as pain becomes manageable  Associated Diagnoses: Status post total knee replacement, right      aspirin 325 MG EC tablet Take 1 tablet by mouth 2 times daily for 28 days  Qty: 56 tablet, Refills: 0           CONTINUE these medications which have NOT CHANGED    Details   atorvastatin (LIPITOR) 20 MG tablet take 1 tablet by mouth every evening  Qty: 90 tablet, Refills: 1    Associated Diagnoses: Mixed hyperlipidemia      vitamin D (ERGOCALCIFEROL) 1.25 MG (68643 UT) CAPS capsule take 1 capsule by mouth every week  Qty: 12 capsule, Refills: 1    Associated Diagnoses: Vitamin D deficiency      meclizine (ANTIVERT) 25 MG tablet Take 1 tablet by mouth 3 times daily as needed for Dizziness  Qty: 90 tablet, Refills: 5    Associated Diagnoses: Dizziness and giddiness      naproxen (NAPROSYN) 500 MG tablet Take 1 tablet by mouth 2 times daily (with meals)  Qty: 20 tablet, Refills: 1      senna (SENOKOT) 8.6 MG tablet Take 1 tablet by mouth      omeprazole (PRILOSEC) 20 MG capsule Take 20 mg by mouth every other day       sildenafil (REVATIO) 20 MG tablet Take 20 mg by mouth as needed           Activity: no driving while on analgesics; weight bearing as tolerated. Diet: regular diet  Wound Care: as directed    Follow-up with  in 1 week.   Call [phone] for appointment     Signed:  Grecia Leal CNP   Orthopaedic Surgery   10/19/21  8:10 AM

## 2021-10-19 NOTE — PROGRESS NOTES
Occupational Therapy  OT BEDSIDE TREATMENT NOTE      Date:10/19/2021  Patient Name: Paulina Galaviz  MRN: [id number]  : 1950  Room: [address] [place] [address]       Evaluating OT: Shena Muller OTR/L   [id number]       Referring Provider:ROBBIE Arriaga CNP    Specific Provider Orders/Date:OT eval and treat 10/18/2021       Diagnosis: OA R Le    Surgery: Status post total knee replacement, right [Z96.651]  10/18/2021     Pertinent Medical History: DJD, arthritis,     Precautions:  Fall Risk, WBAT R LE      Assessment of current deficits    [x]? Functional mobility            [x]?ADLs           [x]? Strength                  []?Cognition    [x]? Functional transfers          [x]? IADLs         [x]? Safety Awareness   [x]? Endurance    []? Fine Coordination                         [x]? Balance      []? Vision/perception   []? Sensation      []? Gross Motor Coordination             []? ROM           []? Delirium                   []? Motor Control      OT PLAN OF CARE   OT POC based on physician orders, patient diagnosis and results of clinical assessment     Frequency/Duration  2-4 days/wk for 2 weeks PRN   Specific OT Treatment Interventions to include:   ADL retraining/adapted techniques and AE recommendations to increase functional independence within precautions                    Energy conservation techniques to improve tolerance for selfcare routine   Functional transfer/mobility training/DME recommendations for increased independence, safety and fall prevention         Patient/family education to increase safety and functional independence             Environmental modifications for safe mobility and completion of ADLs                             Therapeutic activity to improve functional performance during ADLs.                                          Therapeutic exercise to improve tolerance and functional strength for ADLs    Balance retraining/tolerance tasks for facilitation of postural control with dynamic challenges during ADLs .       Positioning to improve functional independence     Recommended Adaptive Equipment: TBD      Home Living: Pt lives with wife, ranch with 2-3 steps/rail    Bathroom setup: tub/shower, grab bars    Equipment owned: walker, cane      Prior Level of Function: Independent  with ADLs , Independent  with IADLs; ambulated with no devie  Driving: yes        Pain Level: marked pain in LE. Nursing notified of pt requesting pain medication. Cognition: Alert and oriented. Cues for safety awareness. Functional Assessment:  AM-PAC Daily Activity Raw Score: 18/24    Initial Eval Status  Date: 10/18/21 Treatment Status  Date:10/19/21 STGs = LTGs  Time frame: 10-14 days   Feeding Independent        Grooming SBA/set-up,seated  Decrease balance Supervision while standing at the sink.   Mod i   UB Dressing SBA/set-up  Setup   Mod I    LB Dressing Max A   Assist threading brief over feet and pulling up over hips   R LE numbness, buckling   SBA  Pt required use of long shoehorn to don shoes. Declined need for AE. States his wife has equipment at home. SBA    Bathing Mod A  Tub transfer completed with CGA. Cues for safety and technique.    SBA    Toileting Assist with through hygiene   SBA Independent    Bed Mobility  SBA  Supine <> sit  SBA supine to sit   Mod i   Functional Transfers Mod A  Sit-stand from bed  SBA and cues for hand placement to increase safety technique.   Supervision    Functional Mobility Mod A,w/walker   R LE buckling, numbness with with balance and R LE stabilization  SBA and cues for sequence/technique using walker.   Supervision  with good tolerance    Balance Sitting:     Static:  SBA- EOB     Dynamic:Mod A   Standing: Mod A    Independent/supervision    Activity Tolerance Fair+ with light activity  Fair   Good  with ADL activity        Comments:  Pain limiting function this session. Cues for safety during functional activity.   Pt remained in chair at end of the session. Declined need for AE. Education/treatment:  ADL retraining with facilitation of movement and instruction of compensatory skills for increased self care. Therapeutic activity to address balance and endurance during ADL and transfers. Pt education of walker safety, transfer safety, and home safety. · Pt has made  progress towards set goals.        Time In: 8:05  Time Out: 8:43     Min Units   Therapeutic Ex 87905     Therapeutic Activities 65747 6 6   ADL/Self Care 82998 30 2   Orthotic Management 71440     Neuro Re-Ed 81445     Non-Billable Time     TOTAL TIMED TREATMENT 38 [address] [facility] RENNY/L [id number]

## 2021-10-19 NOTE — PROGRESS NOTES
Physical Therapy    Facility/Department: [address] ORTHO SURGERY  Treatment note    NAME: Angelito Ingram  : 1950  MRN: [id number]    Date of Service: 10/19/2021               Patient Diagnosis(es): The primary encounter diagnosis was Status post total knee replacement, right. A diagnosis of Preop testing was also pertinent to this visit. 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 has a past surgical history that includes Colonoscopy (); Mitral valve replacement (2014); lipoma resection (Left, 's); cyst incision and drainage; Cardiac catheterization (2014); other surgical history (2017); and Total knee arthroplasty (Right, 10/18/2021). Evaluating Therapist: Yg Hines PT     Referring Provider:  ROBBIE Caceres CNP    PT order : PT eval and treat     Room #:  607   DIAGNOSIS:  OA s/p R TKA 10/18/2021   PRECAUTIONS: falls, FWBAT     Social:  Pt lives with  Spouse  in a  1  floor plan  2  steps and  1  rails to enter. Prior to admission pt walked with  No AD. Has ww, cane      Initial Evaluation  Date:  10/18/2021  Treatment   10/19/21 PM Short Term/ Long Term   Goals   Was pt agreeable to Eval/treatment? yes  yes    Does pt have pain? 1/10 R knee  R quad soreness    Bed Mobility  Rolling: NT   Supine to sit:  SBA   Sit to supine:  SBA   Scooting:  SBA  Supine to sit: SBA S/I     Transfers Sit to stand:   Mod assist   Stand to sit: min assist   Stand pivot:  NT  Sit <> stand: SBA  SBA    Ambulation     3 feet side stepping with  yadira with  Mod assist  180 feet x 2 using [employer] for support   feet with  ww  with  SBA        Stair negotiation: ascended and descended NT  4 stairs x 2 with rail/standard cane for support SBA, 6\" platform step usingn [employer] for support SBA, step to pattern used  4  steps with  1  rail with  SBA    LE ROM  AAROM R knee 5-90 degrees      LE strength  3-/ 5 R knee      AM- PAC RAW score   12/ 24 18/24            Pt is alert and able to follow instruction  Balance: fair dynamic using [employer] for support    Pt performed therapeutic exercise of the following as per written HEP: NT  Patient education  Pt was educated on UE usage to assist with transfers, gait promoting sequence, stair negotiation promoting sequence, car transfer to back into the front passenger seat    Patient response to education:   Pt verbalized understanding Pt demonstrated skill Pt requires further education in this area   yes With  instruction yes     ASSESSMENT:   Comments: Pt found in bed, agreed to rx. Pt sat EOB SBA. . Gait to the hallway, rober remains slow and inconsistent due to R quad pain, step through non reciprocal pattern used, no loss of balance or shortness of breath noted. Stairs performed with minimal difficulty, this improved since AM Rx. Car transfer discussed once back in the room. Pt states has no further questions about home safety. Nurse informed Pt in need of pain medication. Treatment: Pt practiced and was instructed in the following treatment: therapeutic exercise, transfer training, gait mechanics, stair negotiation, car transfer    Pt was left in a bedside chair with call light in reach    Time in 1334  Time out 1401   Total Treatment Time 27 minutes   CPT codes:     Therapeutic activities 33001 27 minutes   Therapeutic exercises 81142 0 minutes       Pt is making good progress toward established Physical Therapy goals. Pt displays functional mobility needed to go home with strong family assistance.   Continue with physical therapy current plan of care.     Jeneal Fillers PTA   License Number: [id number]

## 2021-10-19 NOTE — H&P
Internal Medicine Consultation    Chief Complaint: Elective right knee arthroplasty  Primary Care Physician: Susan Barnes DO  Reason for consultation: Post-op medical management    History of Present Illness  Saskia Medina is a 80 y.o. male who presents for a right knee replacement. The surgery was 10/18/21. It went well and there were no reported complications. The patient pain is controlled. The patient has worked with physical therapy. The plan is for the patient to go home with [place] [address] later today. The patient is  using the incentive spirometer. He has no other complaints at this time including no abdominal pain, nausea, vomiting, chest pain, dyspnea. There are no family or friends at bedside. The history is provided by the patient. He is felt to be a reliable historian.     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 Patel Simpson       Past Surgical History:   Procedure Laterality Date    CARDIAC CATHETERIZATION  08/19/2014    Normal    COLONOSCOPY  2013    Dr. Willard Gomez- repeat 5 years    CYST INCISION AND DRAINAGE Pilonidal    LIPOMA RESECTION Left 1970's    under breast; Dr. Aldo Huertas  09/17/2014    [place]    OTHER SURGICAL HISTORY  02/17/2017    Euflexxa Injection Right knee - Lopez X3    TOTAL KNEE ARTHROPLASTY Right 10/18/2021    RIGHT KNEE TOTAL ROBOTIC CRISPIN ASSISTED ARTHROPLASTY  PNB performed by Dread Martell MD at [facility] OR       Family History  Family History   Problem Relation Age of Onset    Heart Disease Mother         A fib    Atrial Fibrillation Mother     Heart Disease Father     Heart Attack Father     Other Brother         Hemachromatosis     Other Sister         MS     Social History  Patient lives at home with his wife. Employment: Retired  Illicit drug use- Denies  TOBACCO:   reports that he has never smoked. He has quit using smokeless tobacco.  His smokeless tobacco use included chew. ETOH:   reports current alcohol use. Home Medications  Prior to Admission medications    Medication Sig Start Date End Date Taking? Authorizing Provider   oxyCODONE (ROXICODONE) 5 MG immediate release tablet Take 1 tablet by mouth every 6 hours as needed for Pain for up to 7 days. Intended supply: 7 days.  Take lowest dose possible to manage pain 10/18/21 10/25/21 Yes Juan David Washburn APRN - CNP   aspirin 325 MG EC tablet Take 1 tablet by mouth 2 times daily for 28 days 10/18/21 11/15/21 Yes ROBBIE Underwood - CNP   atorvastatin (LIPITOR) 20 MG tablet take 1 tablet by mouth every evening 8/2/21 10/31/21 Yes Asa Catching, DO   vitamin D (ERGOCALCIFEROL) 1.25 MG (61361 UT) CAPS capsule take 1 capsule by mouth every week 6/14/21  Yes Asa Catching, DO   meclizine (ANTIVERT) 25 MG tablet Take 1 tablet by mouth 3 times daily as needed for Dizziness 6/11/21  Yes Asa Catching, DO   naproxen (NAPROSYN) 500 MG tablet Take 1 tablet by mouth 2 times daily (with meals) 12/12/19  Yes Ivelisse Obando, DO   senna (SENOKOT) 8.6 MG tablet Take 1 tablet by mouth   Yes Historical Provider, MD   omeprazole (PRILOSEC) 20 MG capsule Take 20 mg by mouth every other day    Yes Historical Provider, MD   sildenafil (REVATIO) 20 MG tablet Take 20 mg by mouth as needed    Historical Provider, MD       Allergies  Allergies   Allergen Reactions    Bee Venom Swelling    Poison Ivy Extract Rash       Review of Systems  Please see HPI above. All bolded are positive. All un-bolded are negative.   Constitutional Symptoms: fever, chills, fatigue, generalized weakness, diaphoresis, increase in thirst, loss of appetite  Eyes: vision change   Ears, Nose, Mouth, Throat: hearing loss, nasal congestion, sores in the mouth  Cardiovascular: chest pain, chest heaviness, palpitations  Respiratory: shortness of breath, wheezing, coughing  Gastrointestinal: abdominal pain, nausea, vomiting, diarrhea, constipation, melena, hematochezia, hematemesis  Genitourinary: dysuria, hematuria, or increase in frequency  Musculoskeletal: lower extremity edema, myalgias, arthralgias, back pain  Integumentary: rashes, itching   Neurological: headache, lightheadedness, dizziness, confusion, syncope, numbness, tingling, weakness  Psychiatric: depression, suicidal ideation, or anxiety  Endocrine: unintentional weight change  Hematologic/Lymphatic: lymphadenopathy, easy bruising, easy bleeding   Allergic/Immunologic: recurrent infections      Objective  VITALS:  /61   Pulse 61   Temp 97.8 °F (36.6 °C) (Infrared)   Resp 16   Ht 5' 4\" (1.626 m)   Wt 151 lb (68.5 kg)   SpO2 100%   BMI 25.92 kg/m²     Physical Exam:  General: awake, alert, oriented to person, place, time, and purpose, appears stated age, cooperative, no acute distress, pleasant, appropriate mood  Eyes: conjunctivae/corneas clear, sclera non icteric, EOMI  Ears: no obvious scars, no lesions, no masses, hearing intact  Mouth: mucous membranes moist, no obvious oral sores  Head: normocephalic, atraumatic  Neck: no JVD, no adenopathy, no thyromegaly, neck is supple, trachea is midline  Back: ROM normal, no CVA tenderness. Chest: no pain on palpation  Lungs: clear to auscultation bilaterally, without rhonchi, crackle, wheezing, or rale, no retractions or use of accessory muscles  Heart: regular rate and regular rhythm, no murmur, normal S1, S2  Abdomen: soft, non-tender; bowel sounds normal; no masses, no organomegaly  Extremities: no lower extremity edema, post-surgical changes to the right knee, no cyanosis, no clubbing, 2+ pedal pulses palpated  Skin: normal color, normal texture, normal turgor, no rashes, no lesions  Neurologic:5/5 muscle strength throughout, normal muscle tone throughout, face symmetric, hearing intact, tongue midline, speech appropriate without slurring, sensation to fine touch intact in upper and lower extremities    Labs-   Lab Results   Component Value Date    WBC 4.8 10/11/2021    HGB 13.5 10/11/2021    HCT 42.6 10/11/2021     10/11/2021     10/11/2021    K 4.6 10/11/2021     10/11/2021    CREATININE 1.2 10/11/2021    BUN 17 10/11/2021    CO2 26 10/11/2021    GLUCOSE 102 (H) 10/11/2021    ALT 13 06/16/2021    AST 21 06/16/2021    INR 1.2 09/17/2014     No results found for: CKTOTAL, CKMB, CKMBINDEX, TROPONINI    Recent Radiological Studies:  XR KNEE RIGHT (1-2 VIEWS)    Result Date: 10/18/2021  EXAMINATION: TWO XRAY VIEWS OF THE RIGHT KNEE 10/18/2021 9:50 am COMPARISON: 06/17/2019 HISTORY: ORDERING SYSTEM PROVIDED HISTORY: post op TECHNOLOGIST PROVIDED HISTORY: Of operative side while in recovery room. Reason for exam:->post op FINDINGS: A right tricompartment knee arthroplasty device is new. It is intact without radiographic evidence of loosening. Postoperative soft tissue gas is noted. Cylindrical lucencies in the proximal tibial diaphysis suggest site of prior hardware. No acute fracture or dislocation is noted. New arthroplasty without definite radiographic evidence of acute skeletal or hardware pathology.      CT KNEE RIGHT WO CONTRAST    Result Date: 10/4/2021  EXAMINATION: CT OF THE RIGHT KNEE WITHOUT CONTRAST 10/4/2021 9:15 am TECHNIQUE: CT of the right knee was performed without the administration of intravenous contrast utilizing CT Sabas knee protocol. Dose modulation, iterative reconstruction, and/or weight based adjustment of the mA/kV was utilized to reduce the radiation dose to as low as reasonably achievable. COMPARISON: Right knee x-ray exam May 3, 2021 HISTORY ORDERING SYSTEM PROVIDED HISTORY: Primary osteoarthritis of right knee TECHNOLOGIST PROVIDED HISTORY: Reason for exam:->Pre-surgical planning SABAS protocol FINDINGS: Limited CT performed of the hip, knee, and ankle on the right utilizing CT Sabas knee protocol for preoperative right TKA planning. At the right hip, incidental note of diverticulosis coli. At the right knee, osteoarthritis with joint effusion, chondrocalcinosis, and loose bodies. CT Sabas knee protocol performed for preoperative right TKA planning. Image data provided to [facility]. Assessment  Patient Active Problem List    Diagnosis Date Noted    Status post total knee replacement, right 10/18/2021    Chronic pain of right knee 02/07/2017    Primary osteoarthritis of right knee 10/13/2021    Mixed hyperlipidemia 06/11/2021    Plantar fasciitis 05/10/2019    S/P MVR (mitral valve repair) 10/38/2075    Diastolic dysfunction 44/54/9315    MVP (mitral valve prolapse) 07/30/2014    Mitral regurgitation 07/30/2014       Plan  Sp right knee replacement on 10/18/21: Orthopedic surgery admitted patient, DVT prophylaxis per ortho, Pain control per ortho, Encouraged incentive spirometer, PT/OT. Urinary retention: Whiteside catheter was removed today. Check postvoid residual.  Start Flomax. Follow labs  Continue home medications except pain meds  Please see orders for further management and care.    for discharge planning  Discharge plan: Likely home with [place] [address] soon    Patient was seen and evaluated by myself and my attending Dr. Selvin Suh. Assessment and Plan discussed with attending provider, please see attestation for final plan of care. Charlesetta Angelucci, DO   10/19/2021  7:39 AM      Addendum: I have personally participated in a face-to-face history and physical exam on the date of service with the patient. I have discussed the case with the resident. I also participated in medical decision making with the resident on the date of service and I agree with all of the pertinent clinical information unless indicated in my editing of the note. I have reviewed and edited the note above based on my findings during my history, exam, and decision making on the same day of service. My additional thoughts:    Pain is controlled with medications   Check PVR--consider Flomax/urology consultation   Possible discharge home later today    Electronically signed by Ninoska Rubalcava DO on 10/19/2021 at 10:07 AM    I can be reached through [employer].

## 2021-10-19 NOTE — CARE COORDINATION
Social Work:    Mónica kirk [employer] accepted patient's referral and will see patient at home tomorrow.     Electronically signed by EMIL Landers on 10/19/2021 at 3:05 PM

## 2021-10-19 NOTE — PROGRESS NOTES
Physical Therapy    Facility/Department: [address] ORTHO SURGERY  Treatment note    NAME: Hari Medel  : 1950  MRN: [id number]    Date of Service: 10/19/2021               Patient Diagnosis(es): The primary encounter diagnosis was Status post total knee replacement, right. A diagnosis of Preop testing was also pertinent to this visit. 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 has a past surgical history that includes Colonoscopy (); Mitral valve replacement (2014); lipoma resection (Left, 's); cyst incision and drainage; Cardiac catheterization (2014); other surgical history (2017); and Total knee arthroplasty (Right, 10/18/2021). Evaluating Therapist: Zoltan Sue PT     Referring Provider:  ROBBIE Olivarez CNP    PT order : PT eval and treat     Room #:  863   DIAGNOSIS:  OA s/p R TKA 10/18/2021   PRECAUTIONS: falls, FWBAT     Social:  Pt lives with  Spouse  in a  1  floor plan  2  steps and  1  rails to enter. Prior to admission pt walked with  No AD. Has ww, cane      Initial Evaluation  Date:  10/18/2021  Treatment   10/19/21  Short Term/ Long Term   Goals   Was pt agreeable to Eval/treatment? yes  yes    Does pt have pain? 1/10 R knee  R quad soreness    Bed Mobility  Rolling: NT   Supine to sit:  SBA   Sit to supine:  SBA   Scooting:  SBA  Sit to supine: SBA S/I     Transfers Sit to stand:   Mod assist   Stand to sit: min assist   Stand pivot:  NT  Sit <> stand: SBA  SBA    Ambulation     3 feet side stepping with  ww  with Mod assist  120 feet and 70 feet x 1 each using WW for support   feet with  ww  with  SBA        Stair negotiation: ascended and descended NT  4 stairs x 2 with B rails for support CGA, step to pattern used  4  steps with  1  rail with  SBA    LE ROM  AAROM R knee 5-90 degrees      LE strength  3-/ 5 R knee      AM- PAC RAW score   12/ 24 18/24            Pt is alert and able to follow instruction  Balance: fair dynamic using [employer] for support    Pt performed therapeutic exercise of the following as per written HEP: seated B ankle pumps x 50; R LE quad sets x 20 AROM; R LE pillow case slides x 30 AROM    Patient education  Pt was educated on HEP promoting circulation, ROM and strengthening, UE usage to assist with transfers, gait promoting sequence, stair negotiation promoting sequence    Patient response to education:   Pt verbalized understanding Pt demonstrated skill Pt requires further education in this area   yes With repeated instruction yes     ASSESSMENT:   Comments: Pt found in a bedside chair, agreed to rx, exercise performed. Gait to the hallway, rober slow and inconsistent, step through non reciprocal pattern used, no loss of balance or shortness of breath noted. Stairs performed with some difficulty, R knee at times unstable, compensation required with use of B rails for support   Treatment: Pt practiced and was instructed in the following treatment: therapeutic exercise, transfer training, gait mechanics, stair negotiation    Pt was left in bed per request due to fatigue with call light in reach    Time in 0945   Time out 1016   Total Treatment Time 31 minutes   CPT codes:     Therapeutic activities 60875 17 minutes   Therapeutic exercises 79631 14 minutes       Pt is making good progress toward established Physical Therapy goals. Continue with physical therapy current plan of care promoting discharge home after second session today.     Johnny Burger PTA   License Number: [id number]

## 2021-10-19 NOTE — PROGRESS NOTES
ORTHOPAEDIC SURGERY  Progress Note    CC: Status post right Sabas robotic assisted total knee arthroplasty    Subjective: Patient is alert and oriented x3, calm and cooperative showing no signs of acute distress. Reports no overnight issues. Reports pain is well controlled at this time. Vitals  VITALS:  /61   Pulse 61   Temp 97.8 °F (36.6 °C) (Infrared)   Resp 16   Ht 5' 4\" (1.626 m)   Wt 151 lb (68.5 kg)   SpO2 100%   BMI 25.92 kg/m²   24HR INTAKE/OUTPUT:    Intake/Output Summary (Last 24 hours) at 10/19/2021 0743  Last data filed at 10/19/2021 0130  Gross per 24 hour   Intake 1900 ml   Output 865 ml   Net 1035 ml       PHYSICAL EXAM:    Orientation:  alert and oriented to person, place and time    Right Lower Extremity    Incision:  dressing in place, clean, dry and intact    Lower Extremity Motor :  Dorsiflexion:  5/5  Plantarflexion:  5/5  EHL:  5/5    Lower Extremity Sensory: intact L4-S1 distribution     Pulses: Foot warm/well perfused    Abnormal Exam findings:  none      LABS:    CBC:   Lab Results   Component Value Date    WBC 4.8 10/11/2021    RBC 4.55 10/11/2021    HGB 13.5 10/11/2021    HCT 42.6 10/11/2021    MCV 93.6 10/11/2021    MCH 29.7 10/11/2021    MCHC 31.7 10/11/2021    RDW 13.0 10/11/2021     10/11/2021    MPV 10.3 10/11/2021       ASSESSMENT AND PLAN:    Post operative day 1 status post right total Knee arthroplasty    - Weight bearing as tolerated  - Deep venous thrombosis prophylaxis -  BID, SCD's. Sean campae bilateral, thigh high  - Continue physical therapy  - Pain Control: Wean to oral meds   - Dressing change: Aquacel protocol  - D/C Plan:  Home Health, anticipate discharge home today. We discussed discharge medications and follow-up appointment in our office in 1 week. We discussed water restrictions today. Patient verbalized understanding.       Craig Morales, [facility]  Orthopaedic Surgery   10/19/21  7:43 AM

## 2021-10-19 NOTE — CARE COORDINATION
Case Management: Social Work Case Management Initial Assessment  Moe Solis,         Met with:patient  Information verified: address, contacts, phone number, , insurance Yes  PCP: Kristi Benson DO  Pharmacy:   [facility] [address] MART[address]NEZ [address] [place], [place] - [address] [phone]  [address] [facility] [address]  Phone: [phone] Fax: [phone] [address] [facility] [address]  [address] [facility] [address]  Phone: [phone] Fax: [address] [facility] [address] Stefanie Sheets [address]  [address] [facility] [address] [place] [address]  Phone: [phone] Fax: [phone]         Discharge Planning  Patient Status Order: Observation Date: 10-18-21  Readmission within last 30 days:  no  Current Residence: Private Residence  Living Arrangements:  Spouse/Significant Other   Home Access: [facility] of Steps: N/A  Support Systems:  Spouse/Significant Other, Friends/Neighbors  Current Services PTA: None Supplier: N/A  Patient able to perform ADL's: WILL NEED ASSISTANCE  DME used to aid ambulation prior to admission: WHEELED WALKER, 3-1 COMMODE    Potential Assistance Needed:  N/A  Potential Assistance Purchasing Medications:  No  Does patient want to participate in local refill/meds to beds program?: Yes    Patient agreeable to home care: Yes  Type of Home Care Services:  None  Patient expects to be discharged to:       Prior SNF/Rehab Placement and Facility: NO  Agreeable to SNF/Rehab: No    Choices given to patient: YES    Expected Discharge date:  10/19/21  Follow Up Appointment: Best Day/ Time:      Social Work Assessment/Plan: Social service met with Mr. Leidy Gan Santo Cotton in [facility] , as well as today. Kitty Barton is doing well and expects to discharge home today. He has no agency preference of [facility].   A referral was given to

## 2021-10-19 NOTE — PLAN OF CARE
Problem: Falls - Risk of:  Goal: Will remain free from falls  10/19/2021 0809 by Mardi Homans, RN  Outcome: Met This Shift  10/19/2021 0053 by Holden Lezama RN  Outcome: Met This Shift  Goal: Absence of physical injury  10/19/2021 0809 by Mardi Homans, RN  Outcome: Met This Shift  10/19/2021 0053 by Holden Lezama RN  Outcome: Met This Shift     Problem: Pain:  Description: Pain management should include both nonpharmacologic and pharmacologic interventions. Goal: Pain level will decrease  10/19/2021 0809 by Mardi Homans, RN  Outcome: Met This Shift  10/19/2021 0053 by Holden Lezama RN  Outcome: Met This Shift  Goal: Control of acute pain  10/19/2021 0809 by Mardi Homans, RN  Outcome: Met This Shift  10/19/2021 0053 by Holden Lezama RN  Outcome: Met This Shift  Goal: Control of chronic pain  10/19/2021 0809 by Mardi Homans, RN  Outcome: Met This Shift  10/19/2021 0053 by Holden Lezama RN  Outcome: Met This Shift     Problem:  Activity:  Goal: Ability to tolerate increased activity will improve  Outcome: Met This Shift     Problem: Cardiac:  Goal: Hemodynamic stability will improve  Outcome: Met This Shift  Goal: Complications related to the disease process, condition or treatment will be avoided or minimized  Outcome: Met This Shift  Goal: Cerebral tissue perfusion will improve  Outcome: Met This Shift     Problem: Coping:  Goal: Ability to identify and develop effective coping behavior will improve  Outcome: Met This Shift     Problem: Health Behavior:  Goal: Identification of resources available to assist in meeting health care needs will improve  Outcome: Met This Shift     Problem: Nutritional:  Goal: Ability to identify appropriate dietary choices will improve  Outcome: Met This Shift     Problem: Falls - Risk of:  Goal: Will remain free from falls  10/19/2021 0809 by Mardi Homans, RN  Outcome: Met This Shift  10/19/2021 0053 by Holden Lezama RN  Outcome: Met This Shift  Goal: Absence of physical injury  10/19/2021 3929 by Chelsi Dias RN  Outcome: Met This Shift  10/19/2021 0053 by Rambo Stone RN  Outcome: Met This Shift     Problem: Pain:  Description: Pain management should include both nonpharmacologic and pharmacologic interventions. Goal: Pain level will decrease  10/19/2021 0809 by Chelsi Dias RN  Outcome: Met This Shift  10/19/2021 0053 by Rambo Stone RN  Outcome: Met This Shift  Goal: Control of acute pain  10/19/2021 0809 by Chelsi Dias RN  Outcome: Met This Shift  10/19/2021 0053 by Rambo Stone RN  Outcome: Met This Shift  Goal: Control of chronic pain  10/19/2021 0809 by Chelsi Dias RN  Outcome: Met This Shift  10/19/2021 0053 by Rambo Stone RN  Outcome: Met This Shift     Problem:  Activity:  Goal: Ability to tolerate increased activity will improve  Outcome: Met This Shift     Problem: Cardiac:  Goal: Hemodynamic stability will improve  Outcome: Met This Shift  Goal: Complications related to the disease process, condition or treatment will be avoided or minimized  Outcome: Met This Shift  Goal: Cerebral tissue perfusion will improve  Outcome: Met This Shift     Problem: Coping:  Goal: Ability to identify and develop effective coping behavior will improve  Outcome: Met This Shift     Problem: Health Behavior:  Goal: Identification of resources available to assist in meeting health care needs will improve  Outcome: Met This Shift     Problem: Nutritional:  Goal: Ability to identify appropriate dietary choices will improve  Outcome: Met This Shift

## 2021-10-20 ENCOUNTER — TELEPHONE (OUTPATIENT)
Dept: ORTHOPEDIC SURGERY | Age: 71
End: 2021-10-20

## 2021-10-20 NOTE — TELEPHONE ENCOUNTER
Patient called stating that is pain is keeping him up all night, having a hard time dealing with the pain. Educated the pt on medication -- prescribed oxy 5 taking it as prescribed, advised pt (per NP and MD orders) that he can also alternate OTC tylenol and ibu in between his doses to help with pain, ice and continuing to wear renata hose and movement. Pt verbalized understanding. Nursing does go to his house today for an eval. Instructed pt to have them call with any issues.

## 2021-10-27 ENCOUNTER — OFFICE VISIT (OUTPATIENT)
Dept: ORTHOPEDIC SURGERY | Age: 71
End: 2021-10-27

## 2021-10-27 VITALS — WEIGHT: 151 LBS | HEIGHT: 63 IN | BODY MASS INDEX: 26.75 KG/M2

## 2021-10-27 DIAGNOSIS — Z96.651 STATUS POST RIGHT KNEE REPLACEMENT: ICD-10-CM

## 2021-10-27 DIAGNOSIS — M17.11 PRIMARY OSTEOARTHRITIS OF RIGHT KNEE: Primary | ICD-10-CM

## 2021-10-27 PROCEDURE — 99024 POSTOP FOLLOW-UP VISIT: CPT | Performed by: ORTHOPAEDIC SURGERY

## 2021-10-27 RX ORDER — IBUPROFEN 200 MG
200 TABLET ORAL EVERY 6 HOURS PRN
COMMUNITY

## 2021-10-27 RX ORDER — OXYCODONE HYDROCHLORIDE 5 MG/1
5 TABLET ORAL EVERY 6 HOURS PRN
Qty: 28 TABLET | Refills: 0 | Status: SHIPPED | OUTPATIENT
Start: 2021-10-27 | End: 2021-11-03

## 2021-10-27 NOTE — PROGRESS NOTES
Surgical dressings were removed s/p Sabas Robot Assisted Right total knee arthroplasty on 10/18/2021. Incision was cleaned with alcohol prep pads. Minimal swelling and bleeding in area. Sutures removed from right leg.     F

## 2021-10-27 NOTE — PROGRESS NOTES
Follow Up Post Operative Visit     Surgery: Sabas Robot Assisted Right total knee arthroplasty  Date: 10/18/2021    Subjective:    Myla Hilario is here for follow up visit s/p above procedure. He is doing well. He has been compliant    Controlled Substances Monitoring:        Physical Exam:    Height: 5' 3\" (1.6 m), Weight: 151 lb (68.5 kg)    General: Alert and oriented x3, no acute distress  Cardiovascular/pulmonary: No labored breathing, peripheral perfusion intact  Musculoskeletal:    Exam shows intact incision. Minimal swelling. Calf is soft. Range of motion is about 5 to 90 degrees. The knee is stable      Imagin views the right knee show good alignment of right total knee replacement    Impression: Good alignment of right total knee replacement    Assessment and Plan: 1 week out from right total knee replacement doing well  He is doing well overall. He will transition outpatient PT. He was given a refill of pain medicine and will wean to over-the-counter. Follow-up in 6 weeks.   No images needed at that visit    Zack Joshi MD  Orthopaedic Surgery   10/27/21  1:49 PM

## 2021-11-09 ENCOUNTER — EVALUATION (OUTPATIENT)
Dept: PHYSICAL THERAPY | Age: 71
End: 2021-11-09
Payer: MEDICARE

## 2021-11-09 DIAGNOSIS — M17.11 PRIMARY OSTEOARTHRITIS OF RIGHT KNEE: Primary | ICD-10-CM

## 2021-11-09 PROCEDURE — 97110 THERAPEUTIC EXERCISES: CPT | Performed by: PHYSICAL THERAPIST

## 2021-11-09 PROCEDURE — 97161 PT EVAL LOW COMPLEX 20 MIN: CPT | Performed by: PHYSICAL THERAPIST

## 2021-11-09 NOTE — PROGRESS NOTES
[address] [facility]   Phone: [phone]   Fax: [phone]           Date:  2021   Patient: Tawanda Stroud  : 1950  MRN: [id number]  Referring Provider: Randy Chun MD  [address]  [facility]     Medical Diagnosis:   M17.11 (ICD-10-CM) - Primary osteoarthritis of right knee      SUBJECTIVE:     Surgical procedure: Sabas Robot Assisted Right total knee arthroplasty    Date of surgery: 10/18/2021    Services provided following surgery: home PT     History: Pt reports h/o falls on R knee but overall was arthritis that got worse necessitating TKA. Pt reports tendonitis in left elbow bothering him more than knee today. Chief complaint: pain    Behavior: condition is getting better    Pain:   Current: 1.5 /10       Symptom Type/Quality: shooting  Location[de-identified] Knee: anterior      Imaging results: XR KNEE RIGHT (1-2 VIEWS)    Result Date: 10/18/2021  EXAMINATION: TWO XRAY VIEWS OF THE RIGHT KNEE 10/18/2021 9:50 am COMPARISON: 2019 HISTORY: ORDERING SYSTEM PROVIDED HISTORY: post op TECHNOLOGIST PROVIDED HISTORY: Of operative side while in recovery room. Reason for exam:->post op FINDINGS: A right tricompartment knee arthroplasty device is new. It is intact without radiographic evidence of loosening. Postoperative soft tissue gas is noted. Cylindrical lucencies in the proximal tibial diaphysis suggest site of prior hardware. No acute fracture or dislocation is noted. New arthroplasty without definite radiographic evidence of acute skeletal or hardware pathology. XR KNEE RIGHT (3 VIEWS)    Result Date: 10/27/2021  Radiology exam is complete. No Radiologist dictation. Please follow up with ordering provider. XR KNEE BILATERAL STANDING    Result Date: 10/27/2021  Radiology exam is complete. No Radiologist dictation. Please follow up with ordering provider.        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 Amador Hernandez     Past Surgical History:   Procedure Laterality Date    CARDIAC CATHETERIZATION  08/19/2014    Normal    COLONOSCOPY  2013    Dr. Darby Bui- repeat 5 years    CYST INCISION AND DRAINAGE      Pilonidal    LIPOMA RESECTION Left 1970's    under breast; Dr. Sandi Anderson  09/17/2014    [facility]  02/17/2017    Euflexxa Injection Right knee - Gamaliel X3    TOTAL KNEE ARTHROPLASTY Right 10/18/2021    RIGHT KNEE TOTAL ROBOTIC CRISPIN ASSISTED ARTHROPLASTY  PNB performed by Vikki Sharma MD at [facility] OR       Medications:   Current Outpatient Medications   Medication Sig Dispense Refill    ibuprofen (ADVIL;MOTRIN) 200 MG tablet Take 200 mg by mouth every 6 hours as needed for Pain      aspirin 325 MG EC tablet Take 1 tablet by mouth 2 times daily for 28 days 56 tablet 0    sildenafil (REVATIO) 20 MG tablet Take 20 mg by mouth as needed      atorvastatin (LIPITOR) 20 MG tablet take 1 tablet by mouth every evening 90 tablet 1    vitamin D (ERGOCALCIFEROL) 1.25 MG (37683 UT) CAPS capsule take 1 capsule by mouth every week 12 capsule 1    meclizine (ANTIVERT) 25 MG tablet Take 1 tablet by mouth 3 times daily as needed for Dizziness 90 tablet 5    naproxen (NAPROSYN) 500 MG tablet Take 1 tablet by mouth 2 times daily (with meals) 20 tablet 1    senna (SENOKOT) 8.6 MG tablet Take 1 tablet by mouth      omeprazole (PRILOSEC) 20 MG capsule Take 20 mg by mouth every other day        No current facility-administered medications for this visit. Occupation: works for city [employer]. Status: part time. Exercise regimen: none    Hobbies: none    Patient Goals: get outside and be able to take care of yard and animals. Precautions/Contraindications: recent surgery    OBJECTIVE:     Observations: Well nourished male with normal affect. Rises with 0 UE support from chair. Ambulates with cane. Inspection: Incision edges approximated, no purulent drainage, no redness, no swelling, no tenderness and not hot to touch. Edema: mild, moderate     Gait: ambulates with cane    Joint/Motion:    Knee:  Right:   AROM: 100° Flexion,  Lacking 15 ° Extension    Left:   AROM: 130° Flexion,  0° Extension      Strength:    Knee:   Right: Flexion 4/5,  Extension 4/5  Left: Flexion 5/5,  Extension 5/5    Palpation: Tender to palpation medial knee. ASSESSMENT     Outcome Measure:   Lower Extremity Functional Scale (LEFS) 38/80 impairment    Problems:    Pain reported 1.5/10   ROM decreased   Strength decreased   Decreased functional ability with walking, stairs, standing, sitting    [x] There are no barriers affecting plan of care or recovery    [] Barriers to this patient's plan of care or recovery include.     Domestic Concerns:  [x] No  [] Yes:        Long Term goals (4-6 weeks)   Decrease reported pain to 0/10   Increase ROM to 0-120   Increase Strength to 5- to 5/5    Able to perform/complete the following functions/tasks: pt able to perform 10 sit to stands with 0 UE support with no pain/limitation. Pt able to walk 30+  Minutes without assistive device with no pain/limitation. Pt able to go up/down flight of steps with no pain/limitation.  Independent with Home Exercise Programs   Lower Extremity Functional Scale (LEFS) 55/80 impairment    Rehab Potential: [x] Good  [] Fair  [] Poor    PLAN       Treatment Plan:  [x] Therapeutic Exercise  [x] Therapeutic Activity  [x] Neuromuscular Re-education   [x] Gait Training  [x] Balance Training  [x] Aerobic conditioning  [x] Manual Therapy  [x] Massage/Fascial release   [] Work/Sport specific activities    [] Pain Neuroscience [x] Cold/hotpack  [] Vasocompression  [x] Electrical Stimulation  [] Lumbar/Cervical Traction  [x] Ultrasound   [] Iontophoresis: 4 mg/mL Dexamethasone Sodium Phosphate 40-80 mAmin        [x] Instruction in HEP      []  Medication allergies reviewed for use of Dexamethasone Sodium Phosphate 4mg/ml  with iontophoresis treatments. Patient is not allergic. The following CPT codes are likely to be used in the care of this patient: 455 1011 PT Evaluation: Low Complexity   72464 PT Re-Evaluation   1915 [employer] Neuromuscular Re-Education   00105 Therapeutic Activities   46277 Manual Therapy   50954 Gait Training    Electrical Stimulation  38954 US      Suggested Professional Referral: [x] No  [] Yes:     Patient Education:  [x] Plans/Goals, Risks/Benefits discussed  [x] Home exercise program  Method of Education: [x] Verbal  [x] Demo  [x] Written  Comprehension of Education:  [x] Verbalizes understanding. [x] Demonstrates understanding. [] Needs Review. [] Demonstrates/verbalizes understanding of HEP/Ed previously given. Frequency:  2 days per week for 4-6 weeks    Patient understands diagnosis/prognosis and consents to treatment, plan and goals: [x] Yes    [] No     Thank you for the opportunity to work with your patient.   If you have questions or comments, please contact me at numbers listed above.    Electronically signed by: Ezio Bello, PT DPT, PT [id number]         Please sign Physician's Certification and return to: [address] PT  [address] [place] [address]  Dept: [phone]  Dept Fax: [phone] Certification / Comments     Frequency/Duration 2 days per week for 4-6 weeks. Certification period from 11/9/2021  to 12/24/2021. I have reviewed the Plan of Care established for skilled therapy services and certify that the services are required and that they will be provided while the patient is under my care.     Physician's Comments/Revisions:               Physician's Printed Name:                                           [de-identified] Signature:                                                               Date:

## 2021-11-09 NOTE — PROGRESS NOTES
[facility]   Phone: [phone]   Fax: [phone]      Physical Therapy Daily Treatment Note    Date: 2021  Patient Name: Angelito Ingram  : 1950   MRN: [id number]  DOInjury: years   DOSx: 10/18/2021  Referring Provider: Jessica Siu MD  [address]  [facility]     Medical Diagnosis:   M17.11 (ICD-10-CM) - Primary osteoarthritis of right knee    Sabas Robot Assisted Right total knee arthroplasty    Outcome Measure:  LEFS 38/80     S: See eval  O: Mod edema  Time  6626-5643       Visit  - Repeat outcome measure at mid point and end. Pain    1.5/10     ROM  Right:   AROM: 100° Flexion,  Lacking 15 ° Extension     Modalities       Ice, elevation      Stretching       Patella mobs      Prone hangs      Heel props      Knee flex stretch-seated      Prone self flexion stretch      Exercise       Bike Next     Quad sets 20x 5s holds  HEP te   Heel slides 10 x 5s holds  HEP te   SLR 2 x 10  HEP te   Marching       Sidekicks      Squat       Step-ups - FWD       Step-ups - LAT      Step-ups - BWD       Step up and over reciprocally       TG squats      TG Calf raises       LAQ             NMR For safe ambulation in community and home    Marching gait      Side stepping      Retrowalk      Heel to toe      A: Tolerated well. Above added to written HEP.    P: Continue with rehab plan  Keira Neely, PT DPT, PT [id number]    Treatment Charges: Mins Units   Initial Evaluation 23 1   Re-Evaluation     Ther Exercise         TE 10 1   Manual Therapy     MT     Ther Activities        TA     Gait Training          GT     Neuro Re-education NR     Modalities     Non-Billable Service Time     Other     Total Time/Units 33 2

## 2021-11-12 ENCOUNTER — TREATMENT (OUTPATIENT)
Dept: PHYSICAL THERAPY | Age: 71
End: 2021-11-12
Payer: MEDICARE

## 2021-11-12 DIAGNOSIS — M17.11 PRIMARY OSTEOARTHRITIS OF RIGHT KNEE: Primary | ICD-10-CM

## 2021-11-12 PROCEDURE — 97110 THERAPEUTIC EXERCISES: CPT | Performed by: PHYSICAL THERAPIST

## 2021-11-12 NOTE — PROGRESS NOTES
[address] [facility]   Phone: [phone]   Fax: [phone]      Physical Therapy Daily Treatment Note    Date: 2021  Patient Name: Lawrence Perez  : 1950   MRN: [id number]  DOInjury: years   DOSx: 10/18/2021  Referring Provider: Avinash Irwin MD  [address] [facility]     Medical Diagnosis:   M17.11 (ICD-10-CM) - Primary osteoarthritis of right knee    Sabas Robot Assisted Right total knee arthroplasty    Outcome Measure:  LEFS 38/80     S: Pt reports 2/10 pain beginning of session. O:   Time  [id number]      Visit  2 Repeat outcome measure at mid point and end. Pain    2/10     ROM  Right:   AROM: 100° Flexion,  Lacking 10 ° Extension     Modalities       Ice, elevation 10 minutes      Stretching       Patella mobs      Prone hangs      Heel props      Knee flex stretch-seated      Prone self flexion stretch      Exercise       Bike 4 minutes      Quad sets 20x 5s holds  HEP te   Heel slides 20 x 5s holds  HEP te   SLR 2 x 10  HEP te   Marching       Sidekicks      Squat       Step-ups - FWD       Step-ups - LAT      Step-ups - BWD       Step up and over reciprocally       TG squats      TG Calf raises       LAQ             NMR For safe ambulation in community and home    Marching gait      Side stepping      Retrowalk      Heel to toe      A: Tolerated fairly well. When pt got onto bike PT began to give instruction and pt quickly pedaled and stopped with R knee in flexion; Pt yelled in pain but didn't move/kept R knee flexed with pedals stopped. Therapist assisted pt with moving pedals to extend knee. Pt given rest break due to reports pain increase with sudden knee flexion; pt reports he was unable to move his legs to straighten his R leg. With rest break, pt able to continue with session and reports pain 2/10 end of session, just feels stiff. Pt's measurements updated today. Pt instructed to keep up with HEP and ice as needed.      P: Continue with rehab plan  Gabo Mccurdy, PT DPT, PT [id number]    Treatment Charges: Mins Units   Initial Evaluation     Re-Evaluation     Ther Exercise         TE 20 1   Manual Therapy     MT     Ther Activities        TA     Gait Training          GT     Neuro Re-education NR     Modalities     Non-Billable Service Time 15    Other     Total Time/Units 35 1

## 2021-11-16 ENCOUNTER — TREATMENT (OUTPATIENT)
Dept: PHYSICAL THERAPY | Age: 71
End: 2021-11-16
Payer: MEDICARE

## 2021-11-16 DIAGNOSIS — M17.11 PRIMARY OSTEOARTHRITIS OF RIGHT KNEE: Primary | ICD-10-CM

## 2021-11-16 PROCEDURE — 97530 THERAPEUTIC ACTIVITIES: CPT

## 2021-11-16 PROCEDURE — 97110 THERAPEUTIC EXERCISES: CPT

## 2021-11-16 NOTE — PROGRESS NOTES
[address] [facility]   Phone: [phone]   Fax: [phone]      Physical Therapy Daily Treatment Note    Date: 2021  Patient Name: Lane Kumar  : 1950   MRN: [id number]  DOInjury: years   DOSx: 10/18/2021  Referring Provider: No referring provider defined for this encounter. Medical Diagnosis:   M17.11 (ICD-10-CM) - Primary osteoarthritis of right knee    Sabas Robot Assisted Right total knee arthroplasty    Outcome Measure:  LEFS 38/80     S: Pt reports no pain just \"pressure\" upon arrival. Pt with SPC, however minimally using it. O:   Time  [id number]      Visit  3 Repeat outcome measure at mid point and end. Pain    2/10     ROM  Right:   AROM: 110° Flexion,  Lacking  ° Extension  21     Modalities       Ice, elevation      Stretching       Patella mobs      Prone hangs 3 min     Heel props      Knee flex stretch-seated      Prone self flexion stretch      Exercise       Bike 5 minutes  Partial revolutions    Quad sets 20x 5s holds  HEP te   Heel slides 20 x 5s holds  HEP te   SLR 2 x 10  HEP te   Marching       Sidekicks      Squat       Step-ups - FWD       Step-ups - LAT      Step-ups - BWD       Step up and over reciprocally       Foot on stool flexion stretch  10 x 10s hold 8 inch     TG Calf raises X 20      LAQ       X 20      NMR For safe ambulation in community and home    Marching gait      Side stepping      Retrowalk      Heel to toe      A: Tolerated fairly well. No c/o pain following treatment continues to report \"pressure\" better described as tightness. Pt states when flexed he feels tight.     P: Continue with rehab plan  Kamilah Serrano, PTA [id number]    Treatment Charges: Mins Units   Initial Evaluation     Re-Evaluation     Ther Exercise         TE 30 2   Manual Therapy     MT     Ther Activities        TA 9 1   Gait Training          GT     Neuro Re-education NR     Modalities     Non-Billable Service Time     Other     Total Time/Units 39 3

## 2021-11-19 ENCOUNTER — TREATMENT (OUTPATIENT)
Dept: PHYSICAL THERAPY | Age: 71
End: 2021-11-19
Payer: MEDICARE

## 2021-11-19 DIAGNOSIS — G89.29 CHRONIC PAIN OF RIGHT KNEE: ICD-10-CM

## 2021-11-19 DIAGNOSIS — M17.11 PRIMARY OSTEOARTHRITIS OF RIGHT KNEE: Primary | ICD-10-CM

## 2021-11-19 DIAGNOSIS — M25.561 CHRONIC PAIN OF RIGHT KNEE: ICD-10-CM

## 2021-11-19 PROCEDURE — 97530 THERAPEUTIC ACTIVITIES: CPT

## 2021-11-19 PROCEDURE — 97110 THERAPEUTIC EXERCISES: CPT

## 2021-11-19 NOTE — PROGRESS NOTES
[address] [facility]   Phone: [phone]   Fax: [phone]      Physical Therapy Daily Treatment Note    Date: 2021  Patient Name: Moe Solis  : 1950   MRN: [id number]  DOInjury: years   DOSx: 10/18/2021  Referring Provider: Angel Goyal MD  [address]  [facility]     Medical Diagnosis:   M17.11 (ICD-10-CM) - Primary osteoarthritis of right knee    Sabas Robot Assisted Right total knee arthroplasty    Outcome Measure:  LEFS 38/80     S: Pt reports no pain just \"stiffness\" today. O:   Time  [id number]      Visit  4 Repeat outcome measure at mid point and end. Pain    0/10     ROM  Right:   AROM: 110° Flexion,  Lacking  5 ° Extension  21     Modalities       Ice, elevation      Stretching       Patella mobs      Prone hangs 4 min     Heel props      Knee flex stretch-seated      Prone self flexion stretch      Exercise       Bike 8 minutes  Partial revolutions    Quad sets 20x 5s holds  Heel prop  HEP te   Heel slides 20 x 5s holds  HEP te   SLR 2 x 10  HEP te   Marching       Sidekicks      Squat       Step-ups - FWD       Step-ups - LAT      Step-ups - BWD       Step up and over reciprocally       Foot on stool flexion stretch  10 x 10s hold 8 inch     TG Calf raises X 20      LAQ       X 20      NMR For safe ambulation in community and home    Marching gait      Side stepping      Retrowalk      Heel to toe      A: Tolerated fairly well.       P: Continue with rehab plan  Yane Rao, PTA [id number]    Treatment Charges: Mins Units   Initial Evaluation     Re-Evaluation     Ther Exercise         TE 30 2   Manual Therapy     MT     Ther Activities        TA 8 1   Gait Training          GT     Neuro Re-education NR     Modalities     Non-Billable Service Time     Other     Total Time/Units 38 3

## 2021-11-22 ENCOUNTER — TREATMENT (OUTPATIENT)
Dept: PHYSICAL THERAPY | Age: 71
End: 2021-11-22
Payer: MEDICARE

## 2021-11-22 DIAGNOSIS — M17.11 PRIMARY OSTEOARTHRITIS OF RIGHT KNEE: Primary | ICD-10-CM

## 2021-11-22 PROCEDURE — 97530 THERAPEUTIC ACTIVITIES: CPT | Performed by: PHYSICAL THERAPIST

## 2021-11-22 PROCEDURE — 97110 THERAPEUTIC EXERCISES: CPT | Performed by: PHYSICAL THERAPIST

## 2021-11-22 NOTE — PROGRESS NOTES
[address] [facility]   Phone: [phone]   Fax: [phone]      Physical Therapy Daily Treatment Note    Date: 2021  Patient Name: Paluina Galaviz  : 1950   MRN: [id number]  DOInjury: years   DOSx: 10/18/2021  Referring Provider: No referring provider defined for this encounter. Medical Diagnosis:   M17.11 (ICD-10-CM) - Primary osteoarthritis of right knee    Sabas Robot Assisted Right total knee arthroplasty    Outcome Measure:  LEFS 38/80     S: Pt again reports no pain just \"stiffness\" today. O:   Time  1420- 1458      Visit  5 Repeat outcome measure at mid point and end. Pain    0/10     ROM  Right:   AROM: 113° Flexion,  Lacking  2 ° Extension  21     Modalities       Ice, elevation      Stretching       Patella mobs      Prone hangs 4 min     Heel props      Knee flex stretch-seated      Prone self flexion stretch      Exercise       Bike 8 minutes  Partial revolutions    Quad sets 20x 5s holds  Heel prop  HEP te   Heel slides 20 x 5s holds  HEP te   SLR 2 x 10  HEP; flex, abd  te   Marching       Sidekicks      Squat       Step-ups - FWD  2 x 10 B  8 inch step  ta   Step-ups - LAT      Step-ups - BWD       Step up and over reciprocally       Foot on stool flexion stretch  10 x 10s hold 8 inch     TG Calf raises X 20      LAQ       X 30      NMR For safe ambulation in community and home    Marching gait      Side stepping      Retrowalk      Heel to toe      A: Tolerated fairly well. Pt asking about just attending 1 or 2 more visits due to cost and [facility] coming up. Discussed with pt importance of continuing to gain ROM and strength. After discussion with pt, pt agreeable to continuing with PT 1x week instead of 2x week. Discussed importance of consistent HEP. Pt demonstrates understanding.      P: Continue with rehab plan  Rita Guidry, [address] [facility] [id number]    Treatment Charges: Mins Units   Initial Evaluation     Re-Evaluation     Ther Exercise         TE 30 2   Manual Therapy     MT     Ther Activities        TA 8 1   Gait Training          GT     Neuro Re-education NR     Modalities     Non-Billable Service Time     Other     Total Time/Units 38 3

## 2021-11-30 ENCOUNTER — TREATMENT (OUTPATIENT)
Dept: PHYSICAL THERAPY | Age: 71
End: 2021-11-30
Payer: MEDICARE

## 2021-11-30 DIAGNOSIS — M17.11 PRIMARY OSTEOARTHRITIS OF RIGHT KNEE: Primary | ICD-10-CM

## 2021-11-30 PROCEDURE — 97110 THERAPEUTIC EXERCISES: CPT | Performed by: PHYSICAL THERAPIST

## 2021-11-30 PROCEDURE — 97530 THERAPEUTIC ACTIVITIES: CPT | Performed by: PHYSICAL THERAPIST

## 2021-11-30 NOTE — PROGRESS NOTES
[address] [facility]   Phone: [phone]   Fax: [phone]      Physical Therapy Daily Treatment Note    Date: 2021  Patient Name: Dk Virk  : 1950   MRN: [id number]  DOInjury: years   DOSx: 10/18/2021  Referring Provider: No referring provider defined for this encounter. Medical Diagnosis:   M17.11 (ICD-10-CM) - Primary osteoarthritis of right knee    Sabas Robot Assisted Right total knee arthroplasty    Outcome Measure:  LEFS 38/80     S: Pt reports no pain today. O:   Time  1420 -1459       Visit  6 Repeat outcome measure at mid point and end. Pain    0/10     ROM  Right:   AROM: 114° Flexion, 0 ° Extension  21     Modalities       Ice, elevation      Stretching       Patella mobs      Prone hangs 4 min     Heel props      Knee flex stretch-seated      Prone self flexion stretch      Exercise       Bike 10  minutes  Partial revolutions    Quad sets 20x 5s holds  Heel prop  HEP te   Heel slides 20 x 5s holds  HEP te   SLR 2 x 10  HEP; flex, abd  te   Marching       Sidekicks      Squat       Step-ups - FWD  2 x 10 B  8 inch step  ta   Step-ups - LAT      Step-ups - BWD  1 x 10  8 inch step  ta   Step up and over reciprocally       Foot on stool flexion stretch  10 x 10s hold 8 inch     TG Calf raises X 20      LAQ       X 30      NMR For safe ambulation in community and home    Marching gait      Side stepping      Retrowalk      Heel to toe      A: Tolerated well. Pt reports feels that step ups backward was a helpful exercise today.      P: Continue with rehab plan  Logan Ochoa [id number]    Treatment Charges: Mins Units   Initial Evaluation     Re-Evaluation     Ther Exercise         TE 25 2   Manual Therapy     MT     Ther Activities        TA 14 1   Gait Training          GT     Neuro Re-education NR     Modalities     Non-Billable Service Time     Other     Total Time/Units 39 3

## 2021-12-07 ENCOUNTER — TREATMENT (OUTPATIENT)
Dept: PHYSICAL THERAPY | Age: 71
End: 2021-12-07
Payer: MEDICARE

## 2021-12-07 DIAGNOSIS — M25.561 CHRONIC PAIN OF RIGHT KNEE: ICD-10-CM

## 2021-12-07 DIAGNOSIS — G89.29 CHRONIC PAIN OF RIGHT KNEE: ICD-10-CM

## 2021-12-07 DIAGNOSIS — M17.11 PRIMARY OSTEOARTHRITIS OF RIGHT KNEE: Primary | ICD-10-CM

## 2021-12-07 PROCEDURE — 97112 NEUROMUSCULAR REEDUCATION: CPT

## 2021-12-07 PROCEDURE — 97110 THERAPEUTIC EXERCISES: CPT

## 2021-12-08 ENCOUNTER — OFFICE VISIT (OUTPATIENT)
Dept: ORTHOPEDIC SURGERY | Age: 71
End: 2021-12-08

## 2021-12-08 VITALS — WEIGHT: 151 LBS | HEIGHT: 64 IN | BODY MASS INDEX: 25.78 KG/M2

## 2021-12-08 DIAGNOSIS — M17.11 PRIMARY OSTEOARTHRITIS OF RIGHT KNEE: ICD-10-CM

## 2021-12-08 DIAGNOSIS — Z96.651 STATUS POST RIGHT KNEE REPLACEMENT: Primary | ICD-10-CM

## 2021-12-08 PROCEDURE — 99024 POSTOP FOLLOW-UP VISIT: CPT | Performed by: ORTHOPAEDIC SURGERY

## 2021-12-08 NOTE — PROGRESS NOTES
Follow Up Post Operative Visit     Surgery: Sabas Robot Assisted Right total knee arthroplasty  Date: 10/18/2021    Subjective:    Hari Medel is here for follow up visit s/p above procedure. He is doing well. He has been performing all of his normal activities with no issues. He has minimal to no pain    Controlled Substances Monitoring:        Physical Exam:    Height: 5' 3.5\" (1.613 m), Weight: 151 lb (68.5 kg) (per pt)    General: Alert and oriented x3, no acute distress  Cardiovascular/pulmonary: No labored breathing, peripheral perfusion intact  Musculoskeletal:    Exam of the knee shows range of motion about 5 to 120 degrees. Incision is healed. There is no swelling. Knee is stable to varus and valgus at zero and 30 degrees      Imaging: No new images. Previous images reviewed showing good alignment of right total knee replacement    Assessment and Plan: He is over 6 weeks out from right total knee replacement doing well  He will transition to home exercises. Follow-up in 6 weeks with images.   This will be in Leni Shipley MD  Orthopaedic Surgery   12/8/21  1:37 PM

## 2021-12-09 ENCOUNTER — TELEPHONE (OUTPATIENT)
Dept: FAMILY MEDICINE CLINIC | Age: 71
End: 2021-12-09

## 2021-12-09 DIAGNOSIS — E55.9 VITAMIN D DEFICIENCY: ICD-10-CM

## 2021-12-09 DIAGNOSIS — I10 ESSENTIAL HYPERTENSION: ICD-10-CM

## 2021-12-09 DIAGNOSIS — R73.01 IMPAIRED FASTING GLUCOSE: ICD-10-CM

## 2021-12-09 DIAGNOSIS — E78.2 MIXED HYPERLIPIDEMIA: Primary | ICD-10-CM

## 2021-12-09 DIAGNOSIS — Z12.5 SCREENING FOR PROSTATE CANCER: ICD-10-CM

## 2021-12-10 ENCOUNTER — TREATMENT (OUTPATIENT)
Dept: PHYSICAL THERAPY | Age: 71
End: 2021-12-10

## 2021-12-10 DIAGNOSIS — M17.11 PRIMARY OSTEOARTHRITIS OF RIGHT KNEE: Primary | ICD-10-CM

## 2021-12-13 DIAGNOSIS — I10 ESSENTIAL HYPERTENSION: ICD-10-CM

## 2021-12-13 DIAGNOSIS — R73.01 IMPAIRED FASTING GLUCOSE: ICD-10-CM

## 2021-12-13 DIAGNOSIS — Z12.5 SCREENING FOR PROSTATE CANCER: ICD-10-CM

## 2021-12-13 DIAGNOSIS — E55.9 VITAMIN D DEFICIENCY: ICD-10-CM

## 2021-12-13 DIAGNOSIS — E78.2 MIXED HYPERLIPIDEMIA: ICD-10-CM

## 2021-12-13 LAB
ALBUMIN SERPL-MCNC: 4 G/DL (ref 3.5–5.2)
ALP BLD-CCNC: 95 U/L (ref 40–129)
ALT SERPL-CCNC: 11 U/L (ref 0–40)
ANION GAP SERPL CALCULATED.3IONS-SCNC: 17 MMOL/L (ref 7–16)
AST SERPL-CCNC: 15 U/L (ref 0–39)
BASOPHILS ABSOLUTE: 0.04 E9/L (ref 0–0.2)
BASOPHILS RELATIVE PERCENT: 0.5 % (ref 0–2)
BILIRUB SERPL-MCNC: 0.3 MG/DL (ref 0–1.2)
BILIRUBIN URINE: NEGATIVE
BLOOD, URINE: NEGATIVE
BUN BLDV-MCNC: 14 MG/DL (ref 6–23)
CALCIUM SERPL-MCNC: 10 MG/DL (ref 8.6–10.2)
CHLORIDE BLD-SCNC: 105 MMOL/L (ref 98–107)
CHOLESTEROL, TOTAL: 252 MG/DL (ref 0–199)
CLARITY: CLEAR
CO2: 19 MMOL/L (ref 22–29)
COLOR: YELLOW
CREAT SERPL-MCNC: 1.2 MG/DL (ref 0.7–1.2)
EOSINOPHILS ABSOLUTE: 0.37 E9/L (ref 0.05–0.5)
EOSINOPHILS RELATIVE PERCENT: 4.4 % (ref 0–6)
GFR AFRICAN AMERICAN: >60
GFR NON-AFRICAN AMERICAN: 60 ML/MIN/1.73
GLUCOSE BLD-MCNC: 105 MG/DL (ref 74–99)
GLUCOSE URINE: NEGATIVE MG/DL
HBA1C MFR BLD: 5.4 % (ref 4–5.6)
HCT VFR BLD CALC: 41.2 % (ref 37–54)
HDLC SERPL-MCNC: 52 MG/DL
HEMOGLOBIN: 12.8 G/DL (ref 12.5–16.5)
IMMATURE GRANULOCYTES #: 0.01 E9/L
IMMATURE GRANULOCYTES %: 0.1 % (ref 0–5)
KETONES, URINE: NEGATIVE MG/DL
LDL CHOLESTEROL CALCULATED: 174 MG/DL (ref 0–99)
LEUKOCYTE ESTERASE, URINE: NEGATIVE
LYMPHOCYTES ABSOLUTE: 1.35 E9/L (ref 1.5–4)
LYMPHOCYTES RELATIVE PERCENT: 16.1 % (ref 20–42)
MCH RBC QN AUTO: 29.5 PG (ref 26–35)
MCHC RBC AUTO-ENTMCNC: 31.1 % (ref 32–34.5)
MCV RBC AUTO: 94.9 FL (ref 80–99.9)
MONOCYTES ABSOLUTE: 0.68 E9/L (ref 0.1–0.95)
MONOCYTES RELATIVE PERCENT: 8.1 % (ref 2–12)
NEUTROPHILS ABSOLUTE: 5.95 E9/L (ref 1.8–7.3)
NEUTROPHILS RELATIVE PERCENT: 70.8 % (ref 43–80)
NITRITE, URINE: NEGATIVE
PDW BLD-RTO: 13.5 FL (ref 11.5–15)
PH UA: 6 (ref 5–9)
PLATELET # BLD: 392 E9/L (ref 130–450)
PMV BLD AUTO: 10.3 FL (ref 7–12)
POTASSIUM SERPL-SCNC: 4.2 MMOL/L (ref 3.5–5)
PROSTATE SPECIFIC ANTIGEN: 2 NG/ML (ref 0–4)
PROTEIN UA: NEGATIVE MG/DL
RBC # BLD: 4.34 E12/L (ref 3.8–5.8)
SODIUM BLD-SCNC: 141 MMOL/L (ref 132–146)
SPECIFIC GRAVITY UA: 1.02 (ref 1–1.03)
TOTAL PROTEIN: 7.5 G/DL (ref 6.4–8.3)
TRIGL SERPL-MCNC: 130 MG/DL (ref 0–149)
TSH SERPL DL<=0.05 MIU/L-ACNC: 3.53 UIU/ML (ref 0.27–4.2)
UROBILINOGEN, URINE: 0.2 E.U./DL
VITAMIN D 25-HYDROXY: 29 NG/ML (ref 30–100)
VLDLC SERPL CALC-MCNC: 26 MG/DL
WBC # BLD: 8.4 E9/L (ref 4.5–11.5)

## 2021-12-13 NOTE — PROGRESS NOTES
[address] [facility]   Phone: [phone]   Fax: [phone]        Referring Provider: Stephanie Larose MD  [address] [facility]     Medical Diagnosis:   M17.11 (ICD-10-CM) - Primary osteoarthritis of right knee    CERTIFICATION PERIOD: 11/9/2021  to 12/24/2021. ATTENDANCE:  Patient has attended 7 of 12 scheduled treatments from 11/9/2021   to 12/17/2021 . TREATMENTS RECEIVED:  Therapeutic exercise, neuromuscular reeducation, therapeutic activity       COMMENTS AND RECOMMENDATIONS:   Pt called in to self discharge. Pt states that doctor discharged him and he is doing well. Will discharge pt at this time. Recommend pt continue with HEP at home and call with any questions. Thank you for the opportunity to work with your patient.      Joe Aguilar, PT DPT [id number]        ________________________                _______________  Physician     Date

## 2021-12-17 ENCOUNTER — OFFICE VISIT (OUTPATIENT)
Dept: FAMILY MEDICINE CLINIC | Age: 71
End: 2021-12-17
Payer: MEDICARE

## 2021-12-17 VITALS
DIASTOLIC BLOOD PRESSURE: 68 MMHG | TEMPERATURE: 98 F | SYSTOLIC BLOOD PRESSURE: 136 MMHG | HEART RATE: 74 BPM | OXYGEN SATURATION: 98 % | BODY MASS INDEX: 27.46 KG/M2 | HEIGHT: 63 IN | WEIGHT: 155 LBS

## 2021-12-17 DIAGNOSIS — E55.9 VITAMIN D DEFICIENCY: ICD-10-CM

## 2021-12-17 DIAGNOSIS — K21.9 GASTROESOPHAGEAL REFLUX DISEASE WITHOUT ESOPHAGITIS: ICD-10-CM

## 2021-12-17 DIAGNOSIS — R42 DIZZINESS AND GIDDINESS: ICD-10-CM

## 2021-12-17 DIAGNOSIS — Z00.00 ROUTINE GENERAL MEDICAL EXAMINATION AT A HEALTH CARE FACILITY: Primary | ICD-10-CM

## 2021-12-17 DIAGNOSIS — R73.01 IMPAIRED FASTING GLUCOSE: ICD-10-CM

## 2021-12-17 DIAGNOSIS — E78.2 MIXED HYPERLIPIDEMIA: ICD-10-CM

## 2021-12-17 PROCEDURE — G0439 PPPS, SUBSEQ VISIT: HCPCS | Performed by: FAMILY MEDICINE

## 2021-12-17 RX ORDER — OMEPRAZOLE 40 MG/1
40 CAPSULE, DELAYED RELEASE ORAL DAILY
Qty: 90 CAPSULE | Refills: 1 | Status: SHIPPED
Start: 2021-12-17 | End: 2022-06-17 | Stop reason: SDUPTHER

## 2021-12-17 RX ORDER — ERGOCALCIFEROL 1.25 MG/1
50000 CAPSULE ORAL WEEKLY
Qty: 12 CAPSULE | Refills: 1 | Status: SHIPPED
Start: 2021-12-17 | End: 2022-06-17 | Stop reason: SDUPTHER

## 2021-12-17 RX ORDER — ATORVASTATIN CALCIUM 20 MG/1
20 TABLET, FILM COATED ORAL EVERY EVENING
Qty: 90 TABLET | Refills: 1 | Status: SHIPPED
Start: 2021-12-17 | End: 2022-06-17

## 2021-12-17 SDOH — ECONOMIC STABILITY: FOOD INSECURITY: WITHIN THE PAST 12 MONTHS, YOU WORRIED THAT YOUR FOOD WOULD RUN OUT BEFORE YOU GOT MONEY TO BUY MORE.: NEVER TRUE

## 2021-12-17 SDOH — ECONOMIC STABILITY: FOOD INSECURITY: WITHIN THE PAST 12 MONTHS, THE FOOD YOU BOUGHT JUST DIDN'T LAST AND YOU DIDN'T HAVE MONEY TO GET MORE.: NEVER TRUE

## 2021-12-17 ASSESSMENT — LIFESTYLE VARIABLES
AUDIT-C TOTAL SCORE: INCOMPLETE
HOW OFTEN DO YOU HAVE A DRINK CONTAINING ALCOHOL: 0
AUDIT TOTAL SCORE: INCOMPLETE
HOW OFTEN DO YOU HAVE A DRINK CONTAINING ALCOHOL: NEVER

## 2021-12-17 ASSESSMENT — PATIENT HEALTH QUESTIONNAIRE - PHQ9
2. FEELING DOWN, DEPRESSED OR HOPELESS: 0
SUM OF ALL RESPONSES TO PHQ9 QUESTIONS 1 & 2: 0
1. LITTLE INTEREST OR PLEASURE IN DOING THINGS: 0
SUM OF ALL RESPONSES TO PHQ QUESTIONS 1-9: 0

## 2021-12-17 ASSESSMENT — SOCIAL DETERMINANTS OF HEALTH (SDOH): HOW HARD IS IT FOR YOU TO PAY FOR THE VERY BASICS LIKE FOOD, HOUSING, MEDICAL CARE, AND HEATING?: NOT HARD AT ALL

## 2021-12-17 NOTE — PATIENT INSTRUCTIONS
Personalized Preventive Plan for Skip Hood - 12/17/2021  Medicare offers a range of preventive health benefits. Some of the tests and screenings are paid in full while other may be subject to a deductible, co-insurance, and/or copay. Some of these benefits include a comprehensive review of your medical history including lifestyle, illnesses that may run in your family, and various assessments and screenings as appropriate. After reviewing your medical record and screening and assessments performed today your provider may have ordered immunizations, labs, imaging, and/or referrals for you. A list of these orders (if applicable) as well as your Preventive Care list are included within your After Visit Summary for your review. Other Preventive Recommendations:    · A preventive eye exam performed by an eye specialist is recommended every 1-2 years to screen for glaucoma; cataracts, macular degeneration, and other eye disorders. · A preventive dental visit is recommended every 6 months. · Try to get at least 150 minutes of exercise per week or 10,000 steps per day on a pedometer . · Order or download the FREE \"Exercise & Physical Activity: Your Everyday Guide\" from [employer]. Call 2-644.325.8321 or search [employer] online. · You need 8087-1888 mg of calcium and 4077-7442 IU of vitamin D per day. It is possible to meet your calcium requirement with diet alone, but a vitamin D supplement is usually necessary to meet this goal.  · When exposed to the sun, use a sunscreen that protects against both UVA and UVB radiation with an SPF of 30 or greater. Reapply every 2 to 3 hours or after sweating, drying off with a towel, or swimming. · Always wear a seat belt when traveling in a car. Always wear a helmet when riding a bicycle or motorcycle.

## 2021-12-17 NOTE — PROGRESS NOTES
Medicare Annual Wellness Visit  Name: Amina Guo Date: 2021   MRN: [id number] Sex: Male   Age: 80 y.o. Ethnicity: Non- / Non    : 1950 Race: White (non-)      Cady Moody is here for Medicare AWV    Screenings for behavioral, psychosocial and functional/safety risks, and cognitive dysfunction are all negative except as indicated below. These results, as well as other patient data from the [address] form, are documented in Flowsheets linked to this Encounter. Allergies   Allergen Reactions    Bee Venom Swelling    Poison Ivy Extract Rash         Prior to Visit Medications    Medication Sig Taking?  Authorizing Provider   atorvastatin (LIPITOR) 20 MG tablet Take 1 tablet by mouth every evening Yes Brianna Obando DO   vitamin D (ERGOCALCIFEROL) 1.25 MG (15117 UT) CAPS capsule Take 1 capsule by mouth once a week Yes Brianna Obando DO   omeprazole (PRILOSEC) 40 MG delayed release capsule Take 1 capsule by mouth Daily Yes Brianna Obando DO   ibuprofen (ADVIL;MOTRIN) 200 MG tablet Take 200 mg by mouth every 6 hours as needed for Pain Yes Historical Provider, MD   sildenafil (REVATIO) 20 MG tablet Take 20 mg by mouth as needed  Yes Historical Provider, MD   naproxen (NAPROSYN) 500 MG tablet Take 1 tablet by mouth 2 times daily (with meals) Yes Taye Obando DO   senna (SENOKOT) 8.6 MG tablet Take 1 tablet by mouth  Yes Historical Provider, MD         Past Medical History:   Diagnosis Date    Arthritis 2016    Left hand with exuberant soft tissue calcification particularly at second/third MCPs    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    heterozygous C282y gene mutation, neg H63D gene mutation; Dr Bhumika Cee follows    DDD (degenerative disc disease), cervical     Diverticulosis     DJD (degenerative joint disease)     C/T/L Spines    GERD (gastroesophageal reflux disease)     H/O calcium pyrophosphate deposition disease (CPPD)  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 Sam Velásquez       Past Surgical History:   Procedure Laterality Date    CARDIAC CATHETERIZATION  08/19/2014    Normal    COLONOSCOPY  2013    Dr. Lela Joshua- repeat 5 years    CYST INCISION AND DRAINAGE      Pilonidal    LIPOMA RESECTION Left 1970's    under breast; Dr. Annie Holloway  09/17/2014    Ana Border  02/17/2017    Euflexxa Injection Right knee - [place] X3    TOTAL KNEE ARTHROPLASTY Right 10/18/2021    RIGHT KNEE TOTAL ROBOTIC CRISPIN ASSISTED ARTHROPLASTY  PNB performed by Baron Tiffani [facility] at [facility] OR         Family History   Problem Relation Age of Onset    Heart Disease Mother         A fib    Atrial Fibrillation Mother     Heart Disease Father     Heart Attack Father     Other Brother         Hemachromatosis     Other Sister         MS       CareTeam (Including outside providers/suppliers regularly involved in providing care):   Patient Care Team:  Amelie Joaquin DO as PCP - General (Family Medicine)  Amelie Joaquin DO as PCP - [facility] Provider  Enedelia Dickson MD as Consulting Physician (Cardiology)  Kia Goyal MD as Consulting Physician (Cardiothoracic Surgery)  Baron Tiffani MD as Consulting Physician (Orthopedic Surgery)  Regina Oseguera DO as Consulting Physician (Dermatology)  Sarai Gotti DPM as Consulting Physician (Podiatry)    Wt Readings from Last 3 Encounters:   12/17/21 155 lb (70.3 kg)   12/08/21 151 lb (68.5 kg)   10/27/21 151 lb (68.5 kg)     Vitals:    12/17/21 1358 BP: 136/68   Pulse: 74   Temp: 98 °F (36.7 °C)   SpO2: 98%   Weight: 155 lb (70.3 kg)   Height: 5' 3\" (1.6 m)     Body mass index is 27.46 kg/m². Based upon direct observation of the patient, evaluation of cognition reveals recent and remote memory intact. Physical Exam  Vitals and nursing note reviewed. Constitutional:       General: He is not in acute distress. Appearance: Normal appearance. He is well-developed and normal weight. He is not ill-appearing. HENT:      Head: Normocephalic and atraumatic. Right Ear: Hearing and external ear normal.      Left Ear: Hearing and external ear normal.      Nose:      Comments: Patient wearing mask  Eyes:      General: Lids are normal. No scleral icterus. Extraocular Movements: Extraocular movements intact. Conjunctiva/sclera: Conjunctivae normal.   Neck:      Thyroid: No thyromegaly. Vascular: No carotid bruit. Cardiovascular:      Rate and Rhythm: Normal rate and regular rhythm. Heart sounds: Normal heart sounds. No murmur heard. Pulmonary:      Effort: Pulmonary effort is normal. No respiratory distress. Breath sounds: Normal breath sounds. No wheezing. Musculoskeletal:         General: No tenderness or deformity. Normal range of motion. Cervical back: Normal range of motion and neck supple. Right lower leg: No edema. Left lower leg: No edema. Comments: Surgical scar noted to R knee   Lymphadenopathy:      Cervical: No cervical adenopathy. Skin:     General: Skin is warm and dry. Findings: No rash. Neurological:      General: No focal deficit present. Mental Status: He is alert and oriented to person, place, and time. Gait: Gait normal.   Psychiatric:         Mood and Affect: Mood and affect normal.         Speech: Speech normal.         Behavior: Behavior normal.         Thought Content:  Thought content normal.         Patient's complete Health Risk Assessment and screening values have been reviewed and are found in Flowsheets. The following problems were reviewed today and where indicated follow up appointments were made and/or referrals ordered. Positive Risk Factor Screenings with Interventions:            General Health and ACP:  General  In general, how would you say your health is?: Good  In the past 7 days, have you experienced any of the following?  New or Increased Pain, New or Increased Fatigue, Loneliness, Social Isolation, Stress or Anger?: (!) New or Increased Pain  Do you get the social and emotional support that you need?: Yes  Do you have a Living Will?: (!) No  Advance Directives     Power of  Living Will ACP-Advance Directive ACP-Power of     Not on File Not on File Not on File Not on File      General Health Risk Interventions:  · Pain issues: Just had R knee replaced  · No Living Will: Patient declines ACP discussion/assistance    Health Habits/Nutrition:  Health Habits/Nutrition  Do you exercise for at least 20 minutes 2-3 times per week?: (!) No  Have you lost any weight without trying in the past 3 months?: (!) Yes  Do you eat only one meal per day?: (!) Yes  Have you seen the dentist within the past year?: Yes  Body mass index: (!) 27.45  Health Habits/Nutrition Interventions:  · Inadequate physical activity:  patient is not ready to increase his/her physical activity level at this time  · Nutritional issues:  discussed dietary changes            Personalized Preventive Plan   Current Health Maintenance Status  Immunization History   Administered Date(s) Administered    COVID-19, Shirley , Primary or Immunocompromised, PF, 100mcg/0.5mL 02/13/2021, 03/13/2021, 12/12/2021    Influenza Virus Vaccine 09/30/2014, 08/30/2017, 09/17/2018    Influenza, High Dose (Fluzone 65 yrs and older) 08/30/2017, 10/12/2019    Influenza, Quadv, adjuvanted, 65 yrs +, IM, PF (Fluad) 09/21/2021    Influenza, Triv, inactivated, subunit, adjuvanted, IM (Fluad 65 yrs and older) 09/17/2018, 09/30/2019, 10/28/2020    Pneumococcal Conjugate 13-valent ([id number]) 04/09/2019    Pneumococcal Polysaccharide ([id number]) 12/11/2020        Health Maintenance   Topic Date Due    DTaP/Tdap/Td vaccine (1 - Tdap) Never done    Shingles Vaccine (1 of 2) Never done   [employer] Visit (AWV)  12/12/2021    Lipid screen  12/13/2022    Colon cancer screen colonoscopy  10/07/2024    Diabetes screen  12/13/2024    Flu vaccine  Completed    Pneumococcal 65+ years Vaccine  Completed    COVID-19 Vaccine  Completed    Hepatitis C screen  Completed    Hepatitis A vaccine  Aged Out    Hepatitis B vaccine  Aged Out    Hib vaccine  Aged Out    Meningococcal (ACWY) vaccine  Aged Out     Recommendations for [employer] Due: see orders and patient instructions/AVS.    Recommended screening schedule for the next 5-10 years is provided to the patient in written form: see Patient Instructions/AVS.      Assessment and Plan  Kary Woody was seen today for medicare awv. Diagnoses and all orders for this visit:    Routine general medical examination at a health care facility  -     CBC Auto Differential; Future  -     Comprehensive Metabolic Panel; Future  -     Lipid Panel; Future  -     Urinalysis; Future  HRA reviewed and addressed. UTD on HM. Fasting labs reviewed. Repeat in 6 months. Mixed hyperlipidemia  -     atorvastatin (LIPITOR) 20 MG tablet; Take 1 tablet by mouth every evening  -     CBC Auto Differential; Future  -     Comprehensive Metabolic Panel; Future  -     Lipid Panel; Future  -     TSH without Reflex; Future  -     Urinalysis; Future  Loss of control. Patient has not been taking medication regularly. Gastroesophageal reflux disease without esophagitis  -     omeprazole (PRILOSEC) 40 MG delayed release capsule; Take 1 capsule by mouth Daily    Dizziness and giddiness  -     TSH without Reflex;  Future    Impaired fasting glucose  -     Hemoglobin A1C; Future    Vitamin D deficiency  -     vitamin D (ERGOCALCIFEROL) 1.25 MG (52244 UT) CAPS capsule; Take 1 capsule by mouth once a week  -     Vitamin D 25 Hydroxy; Future          Return in about 6 months (around 6/17/2022), or if symptoms worsen or fail to improve, for Chronic medical conditions.       Seen By:  Angie Dejesus,

## 2022-01-04 ENCOUNTER — TELEPHONE (OUTPATIENT)
Dept: PHARMACY | Facility: CLINIC | Age: 72
End: 2022-01-04

## 2022-01-04 RX ORDER — SILDENAFIL CITRATE 20 MG/1
20 TABLET ORAL DAILY PRN
Qty: 30 TABLET | Refills: 5 | Status: SHIPPED
Start: 2022-01-04 | End: 2022-06-17 | Stop reason: SDUPTHER

## 2022-01-04 NOTE — TELEPHONE ENCOUNTER
[facility] CLINICAL PHARMACY REVIEW: ADHERENCE REVIEW  Identified care gap per [employer]; fills at [address]: Statin adherence    Last Visit: 12/17/21    ASSESSMENT  STATIN ADHERENCE    Per Insurance Records through 12/4/21 (ROSEY Amador = Filled only once; Potential Fail Date: 9/23/21): Atorvastatin last filled on 5/10/21 for 90 day supply. Next refill due: 8/8/21    Per chart review: patient reported on 9/21/21 that he was only taking atorvastatin every 2 days. Marked for f/u on 1/1/22 to update prescription on file. Patient had OV with PCP on 12/17/21 and per OV note: \"Loss of control. Patient has not been taking medication regularly. \" Refill for atorvastatin with instructions to take daily was sent to the pharmacy at that time. Unclear if patient was instructed to start taking daily. Per [employer] Records: Atorvastatin last filled on 12/17/21 for 90 day supply. Patient filled medication twice in 2021, thus failing measure. Lab Results   Component Value Date    CHOL 252 (H) 12/13/2021    TRIG 130 12/13/2021    HDL 52 12/13/2021    LDLCALC 174 (H) 12/13/2021     ALT   Date Value Ref Range Status   12/13/2021 11 0 - 40 U/L Final     AST   Date Value Ref Range Status   12/13/2021 15 0 - 39 U/L Final     The 10-year ASCVD risk score (Angy Granados, et al., 2013) is: 21.8%    Values used to calculate the score:      Age: 80 years      Sex: Male      Is Non- : No      Diabetic: No      Tobacco smoker: No      Systolic Blood Pressure: 175 mmHg      Is BP treated: No      HDL Cholesterol: 52 mg/dL      Total Cholesterol: 252 mg/dL     PLAN  The following are interventions that have been identified:   - Need to confirm if patient is taking atorvastatin differently than prescribed and need to obtain an updated order if so    Attempting to reach patient to review.  Left message asking for return call.  Will attempt to contact the patient again to discuss atorvastatin therapy and confirm how he

## 2022-01-04 NOTE — TELEPHONE ENCOUNTER
Incoming call received from the patient - states that he is taking atorvastatin daily now. He was instructed by his PCP at his last OV to resume taking daily since his lipid panel worsened. Thanked patient for the return call. Will sign off at this time.      Jenise Hammans, PharmD, [facility] // Department, toll free [phone], option 1      For Pharmacy [id number] [place] [address] Road in place:  No   Recommendation Provided To: Patient/Caregiver: 1 via Telephone   Intervention Detail: Adherence Monitorin   Gap Closed?: Yes    Intervention Accepted By: Patient/Caregiver: 1   Time Spent (min): 15

## 2022-01-25 ENCOUNTER — OFFICE VISIT (OUTPATIENT)
Dept: ORTHOPEDIC SURGERY | Age: 72
End: 2022-01-25
Payer: MEDICARE

## 2022-01-25 VITALS — BODY MASS INDEX: 26.93 KG/M2 | WEIGHT: 152 LBS | HEIGHT: 63 IN

## 2022-01-25 DIAGNOSIS — Z96.651 STATUS POST RIGHT KNEE REPLACEMENT: Primary | ICD-10-CM

## 2022-01-25 DIAGNOSIS — M17.11 PRIMARY OSTEOARTHRITIS OF RIGHT KNEE: ICD-10-CM

## 2022-01-25 PROCEDURE — 99213 OFFICE O/P EST LOW 20 MIN: CPT | Performed by: ORTHOPAEDIC SURGERY

## 2022-01-25 NOTE — PROGRESS NOTES
Follow Up Post Operative Visit     Surgery: Sabas Robot Assisted Right total knee arthroplasty  Date: 10/18/2021    Subjective:    Denia Araiza is here for follow up visit s/p above procedure. He is doing well. He has been doing all normal activities. Has been carrying wood up and down his basement stairs without any issues      Controlled Substances Monitoring:        Physical Exam:    Height: 5' 3\" (1.6 m), Weight: 152 lb (68.9 kg) (per pt)    General: Alert and oriented x3, no acute distress  Cardiovascular/pulmonary: No labored breathing, peripheral perfusion intact  Musculoskeletal:    Exam of the knee shows good alignment with healed incision. There is no effusion. Range of motion is 0 to 130 degrees. The knee is stable throughout      Imaging: X-rays of the right knee 4 views shows good alignment of right total knee replacement    Impression: Good alignment of right total knee arthroplasty    Assessment and Plan: Status post right total knee arthroplasty 3 months out doing very well  He is doing very well today. He can continue with activities as tolerated. Follow-up in 3 months with images.     Brittany Terrazas MD  Orthopaedic Surgery   1/25/22  9:16 AM

## 2022-04-26 ENCOUNTER — OFFICE VISIT (OUTPATIENT)
Dept: PRIMARY CARE CLINIC | Age: 72
End: 2022-04-26
Payer: MEDICARE

## 2022-04-26 VITALS
SYSTOLIC BLOOD PRESSURE: 132 MMHG | HEART RATE: 67 BPM | HEIGHT: 64 IN | OXYGEN SATURATION: 98 % | TEMPERATURE: 97.1 F | WEIGHT: 159 LBS | DIASTOLIC BLOOD PRESSURE: 82 MMHG | BODY MASS INDEX: 27.14 KG/M2

## 2022-04-26 DIAGNOSIS — E78.2 MIXED HYPERLIPIDEMIA: ICD-10-CM

## 2022-04-26 DIAGNOSIS — D64.9 ANEMIA, UNSPECIFIED TYPE: ICD-10-CM

## 2022-04-26 DIAGNOSIS — R63.0 DECREASED APPETITE: ICD-10-CM

## 2022-04-26 DIAGNOSIS — R53.83 OTHER FATIGUE: ICD-10-CM

## 2022-04-26 DIAGNOSIS — I51.89 DIASTOLIC DYSFUNCTION: ICD-10-CM

## 2022-04-26 DIAGNOSIS — R06.09 DOE (DYSPNEA ON EXERTION): ICD-10-CM

## 2022-04-26 DIAGNOSIS — I34.0 NONRHEUMATIC MITRAL VALVE REGURGITATION: ICD-10-CM

## 2022-04-26 DIAGNOSIS — R53.83 OTHER FATIGUE: Primary | ICD-10-CM

## 2022-04-26 DIAGNOSIS — M79.10 MYALGIA: ICD-10-CM

## 2022-04-26 LAB
ALBUMIN SERPL-MCNC: 4.4 G/DL (ref 3.5–5.2)
ALP BLD-CCNC: 84 U/L (ref 40–129)
ALT SERPL-CCNC: 18 U/L (ref 0–40)
ANION GAP SERPL CALCULATED.3IONS-SCNC: 18 MMOL/L (ref 7–16)
AST SERPL-CCNC: 26 U/L (ref 0–39)
BACTERIA: ABNORMAL /HPF
BASOPHILS ABSOLUTE: 0.06 E9/L (ref 0–0.2)
BASOPHILS RELATIVE PERCENT: 1.1 % (ref 0–2)
BILIRUB SERPL-MCNC: 0.4 MG/DL (ref 0–1.2)
BILIRUBIN URINE: NEGATIVE
BLOOD, URINE: ABNORMAL
BUN BLDV-MCNC: 20 MG/DL (ref 6–23)
CALCIUM SERPL-MCNC: 9.9 MG/DL (ref 8.6–10.2)
CHLORIDE BLD-SCNC: 104 MMOL/L (ref 98–107)
CLARITY: CLEAR
CO2: 19 MMOL/L (ref 22–29)
COLOR: YELLOW
CREAT SERPL-MCNC: 1.5 MG/DL (ref 0.7–1.2)
EOSINOPHILS ABSOLUTE: 0.15 E9/L (ref 0.05–0.5)
EOSINOPHILS RELATIVE PERCENT: 2.7 % (ref 0–6)
FERRITIN: 283 NG/ML
FOLATE: >20 NG/ML (ref 4.8–24.2)
GFR AFRICAN AMERICAN: 56
GFR NON-AFRICAN AMERICAN: 46 ML/MIN/1.73
GLUCOSE BLD-MCNC: 99 MG/DL (ref 74–99)
GLUCOSE URINE: NEGATIVE MG/DL
HCT VFR BLD CALC: 43.3 % (ref 37–54)
HEMOGLOBIN: 13.7 G/DL (ref 12.5–16.5)
IMMATURE GRANULOCYTES #: 0.02 E9/L
IMMATURE GRANULOCYTES %: 0.4 % (ref 0–5)
IRON SATURATION: 41 % (ref 20–55)
IRON: 110 MCG/DL (ref 59–158)
KETONES, URINE: NEGATIVE MG/DL
LEUKOCYTE ESTERASE, URINE: NEGATIVE
LYMPHOCYTES ABSOLUTE: 1.71 E9/L (ref 1.5–4)
LYMPHOCYTES RELATIVE PERCENT: 30.3 % (ref 20–42)
MCH RBC QN AUTO: 29.3 PG (ref 26–35)
MCHC RBC AUTO-ENTMCNC: 31.6 % (ref 32–34.5)
MCV RBC AUTO: 92.7 FL (ref 80–99.9)
MONOCYTES ABSOLUTE: 0.47 E9/L (ref 0.1–0.95)
MONOCYTES RELATIVE PERCENT: 8.3 % (ref 2–12)
NEUTROPHILS ABSOLUTE: 3.23 E9/L (ref 1.8–7.3)
NEUTROPHILS RELATIVE PERCENT: 57.2 % (ref 43–80)
NITRITE, URINE: NEGATIVE
PDW BLD-RTO: 12.9 FL (ref 11.5–15)
PH UA: 6 (ref 5–9)
PLATELET # BLD: 377 E9/L (ref 130–450)
PMV BLD AUTO: 10.1 FL (ref 7–12)
POTASSIUM SERPL-SCNC: 5.2 MMOL/L (ref 3.5–5)
PROTEIN UA: NEGATIVE MG/DL
RBC # BLD: 4.67 E12/L (ref 3.8–5.8)
RBC UA: ABNORMAL /HPF (ref 0–2)
SODIUM BLD-SCNC: 141 MMOL/L (ref 132–146)
SPECIFIC GRAVITY UA: 1.02 (ref 1–1.03)
TOTAL IRON BINDING CAPACITY: 268 MCG/DL (ref 250–450)
TOTAL PROTEIN: 7.7 G/DL (ref 6.4–8.3)
UROBILINOGEN, URINE: 0.2 E.U./DL
VITAMIN B-12: 301 PG/ML (ref 211–946)
WBC # BLD: 5.6 E9/L (ref 4.5–11.5)
WBC UA: ABNORMAL /HPF (ref 0–5)

## 2022-04-26 PROCEDURE — 93000 ELECTROCARDIOGRAM COMPLETE: CPT | Performed by: FAMILY MEDICINE

## 2022-04-26 PROCEDURE — 99214 OFFICE O/P EST MOD 30 MIN: CPT | Performed by: FAMILY MEDICINE

## 2022-04-26 ASSESSMENT — ENCOUNTER SYMPTOMS
BACK PAIN: 0
WHEEZING: 0
CONSTIPATION: 0
COUGH: 0
NAUSEA: 0
ABDOMINAL PAIN: 0
DIARRHEA: 0
VOMITING: 0
SHORTNESS OF BREATH: 1

## 2022-04-26 NOTE — PROGRESS NOTES
22  Marli Santillan : 1950 Sex: male  Age: 80 y.o. Chief Complaint   Patient presents with    Fatigue     loss of appetite, leg cramps, weakness for 3-4 weeks     HPI:  80 y.o. male presents for acute visit due to issues with fatigue. He has been noticing symptoms for the last 3-4 weeks. Difficulty with MOSHER while out in the yard cutting firewood or doing an activity around the house. Feels his heart \"pounding\" at night. Has had episodes of \"cold sweats\" but no fever or chills. No true chest pain. Intermittent leg cramping and decreased appetite. No weight loss. No URI symptoms. He has been seen by Dr. Mirna Chung in the last 6 months without any changes. He is concerned about anemia due to brother's history of hemochromatosis. ROS:  Review of Systems   Constitutional: Positive for appetite change and fatigue. Negative for chills, fever and unexpected weight change. Respiratory: Positive for shortness of breath. Negative for cough and wheezing. Cardiovascular: Negative for chest pain and palpitations. Gastrointestinal: Negative for abdominal pain, constipation, diarrhea, nausea and vomiting. Musculoskeletal: Positive for arthralgias, gait problem and myalgias. Negative for back pain. Skin: Negative for rash. Neurological: Negative for dizziness and headaches. Psychiatric/Behavioral: Positive for sleep disturbance. Negative for dysphoric mood. The patient is not nervous/anxious. All other systems reviewed and are negative.      Current Outpatient Medications on File Prior to Visit   Medication Sig Dispense Refill    sildenafil (REVATIO) 20 MG tablet Take 1 tablet by mouth daily as needed (ED) 30 tablet 5    atorvastatin (LIPITOR) 20 MG tablet Take 1 tablet by mouth every evening 90 tablet 1    vitamin D (ERGOCALCIFEROL) 1.25 MG (31766 UT) CAPS capsule Take 1 capsule by mouth once a week 12 capsule 1    omeprazole (PRILOSEC) 40 MG delayed release capsule Take 1 capsule by mouth Daily 90 capsule 1    ibuprofen (ADVIL;MOTRIN) 200 MG tablet Take 200 mg by mouth every 6 hours as needed for Pain      naproxen (NAPROSYN) 500 MG tablet Take 1 tablet by mouth 2 times daily (with meals) 20 tablet 1    senna (SENOKOT) 8.6 MG tablet Take 1 tablet by mouth        No current facility-administered medications on file prior to visit.        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    heterozygous C282y gene mutation, neg H63D gene mutation; Dr Sebastian Tan follows    DDD (degenerative disc disease), cervical     Diverticulosis     DJD (degenerative joint disease)     C/T/L Spines    GERD (gastroesophageal reflux disease)     H/O calcium pyrophosphate deposition disease (CPPD)     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 Lizarraga Fuelling     Past Surgical History:   Procedure Laterality Date    CARDIAC CATHETERIZATION  08/19/2014    Normal    COLONOSCOPY  2013    Dr. Shelley Madden- repeat 5 years    CYST INCISION AND DRAINAGE      Pilonidal    LIPOMA RESECTION Left 1970's    under breast; Dr. Keon Delgado  09/17/2014    [facility]  02/17/2017    Euflexxa Injection Right knee - [place] X3    TOTAL KNEE ARTHROPLASTY Right 10/18/2021    RIGHT KNEE TOTAL ROBOTIC CRISPIN ASSISTED ARTHROPLASTY  PNB performed by Ankush Lu Charlene Sanchez MD at [facility] OR     Family History   Problem Relation Age of Onset    Heart Disease Mother         A fib    Atrial Fibrillation Mother     Heart Disease Father     Heart Attack Father     Other Brother         Hemachromatosis     Other Sister         MS     Social History     Tobacco History     Smoking Status  Never Smoker    Smokeless Tobacco Use  Former User Smokeless Tobacco Type  Chew    Tobacco Comment  snuff user daily                 Vitals:    04/26/22 0946   BP: 132/82   Pulse: 67   Temp: 97.1 °F (36.2 °C)   SpO2: 98%   Weight: 159 lb (72.1 kg)   Height: 5' 4\" (1.626 m)       Physical Exam:  Physical Exam  Vitals and nursing note reviewed. Constitutional:       General: He is not in acute distress. Appearance: Normal appearance. He is well-developed and normal weight. He is not ill-appearing. HENT:      Head: Normocephalic and atraumatic. Right Ear: Hearing and external ear normal.      Left Ear: Hearing and external ear normal.      Nose:      Comments: Patient wearing mask  Eyes:      General: Lids are normal. No scleral icterus. Extraocular Movements: Extraocular movements intact. Conjunctiva/sclera: Conjunctivae normal.   Neck:      Thyroid: No thyromegaly. Vascular: No carotid bruit. Cardiovascular:      Rate and Rhythm: Normal rate and regular rhythm. Heart sounds: Normal heart sounds. No murmur heard. Pulmonary:      Effort: Pulmonary effort is normal. No respiratory distress. Breath sounds: Normal breath sounds. No wheezing. Musculoskeletal:         General: No tenderness or deformity. Normal range of motion. Cervical back: Normal range of motion and neck supple. Right lower leg: No edema. Left lower leg: No edema. Comments: Surgical scar noted to R knee   Lymphadenopathy:      Cervical: No cervical adenopathy. Skin:     General: Skin is warm and dry. Findings: No rash.    Neurological:      General: No focal deficit present. Mental Status: He is alert and oriented to person, place, and time. Gait: Gait normal.   Psychiatric:         Mood and Affect: Mood and affect normal.         Speech: Speech normal.         Behavior: Behavior normal.         Thought Content: Thought content normal.         No results found for this visit on 04/26/22. Assessment and Plan:  DANIELLE was seen today for fatigue. Diagnoses and all orders for this visit:    Other fatigue  -     CBC with Auto Differential; Future  -     Comprehensive Metabolic Panel; Future  -     Iron and TIBC; Future  -     Ferritin; Future  -     Vitamin B12 & Folate; Future  -     Urinalysis; Future  -     EKG 12 lead; Future  -     Testosterone, free, total; Future  -     EKG 12 lead    MOSHER (dyspnea on exertion)    Decreased appetite    Myalgia    Anemia, unspecified type  -     Iron and TIBC; Future  -     Ferritin; Future  -     Vitamin B12 & Folate; Future    Diastolic dysfunction    Nonrheumatic mitral valve regurgitation    Mixed hyperlipidemia    EKG reviewed in detail with patient- NSR without ST or T wave changes. No change from EKG in Sept 2021. Doubt cardiac cause of symptoms. Question if combination of symptoms is related to taking Lipitor. He notes that some of the symptoms have been ongoing since starting it. Will check some routine labs to rule out metabolic cause. Recommended that patient hold Lipitor for 2 weeks and see if symptoms improve. He will call and let us know. Also recommended Flonase/Nasacort for routine seasonal allergy/vertigo symptoms to see if that helps with the SOB he feels. Return if symptoms worsen or fail to improve.       Seen By:  Rohan Kaur DO

## 2022-04-27 LAB
SEX HORMONE BINDING GLOBULIN: 59 NMOL/L (ref 11–80)
TESTOSTERONE FREE-NONMALE: 84.7 PG/ML (ref 47–244)
TESTOSTERONE TOTAL: 585 NG/DL (ref 220–1000)

## 2022-04-28 ENCOUNTER — OFFICE VISIT (OUTPATIENT)
Dept: ORTHOPEDIC SURGERY | Age: 72
End: 2022-04-28
Payer: MEDICARE

## 2022-04-28 VITALS — BODY MASS INDEX: 25.83 KG/M2 | WEIGHT: 155 LBS | HEIGHT: 65 IN

## 2022-04-28 DIAGNOSIS — Z96.651 STATUS POST RIGHT KNEE REPLACEMENT: Primary | ICD-10-CM

## 2022-04-28 PROCEDURE — 99213 OFFICE O/P EST LOW 20 MIN: CPT | Performed by: NURSE PRACTITIONER

## 2022-04-28 NOTE — PROGRESS NOTES
Follow Up Post Operative Visit     Surgery: Sabas Robot Assisted Right total knee arthroplasty  Date: 10/18/2021    Subjective:    Marli Santillan is here for follow up visit s/p above procedure. He is doing well. He is returned to all activities without restrictions. He is tolerated well. He denies pain during today's visit. Controlled Substances Monitoring:        Physical Exam:    No data recorded    General: Alert and oriented x3, no acute distress  Cardiovascular/pulmonary: No labored breathing, peripheral perfusion intact  Musculoskeletal:    Right knee exam incision site is healed mature scar present. Neurovascular sensation grossly intact, no swelling deformity or tenderness. Range of motion 0-120. Stable valgus and varus exams. Still patella tracked midline      Imaging: X-ray including 4 views right knee shows status post total knee arthroplasty without complication. Impression: Status post right total knee arthroplasty without complication    Assessment and Plan: Status post right Sabas robotic assisted total knee arthroplasty x6 months out    Today we discussed his right knee. He 6 months out from procedure listed above. He is doing well. He is returned to all activity without restrictions. Patient has no chief complaints during today's visit. We will continue with activities as tolerated. He will follow-up in 6 months for his 1 year postoperative appointment with Dr. Sina Glass for final imaging. Patient verbalized understanding.       ROBBIE Snow-CNP  Orthopedic Surgery   04/28/22  3:32 PM

## 2022-06-09 DIAGNOSIS — E78.2 MIXED HYPERLIPIDEMIA: ICD-10-CM

## 2022-06-09 DIAGNOSIS — Z00.00 ROUTINE GENERAL MEDICAL EXAMINATION AT A HEALTH CARE FACILITY: ICD-10-CM

## 2022-06-09 DIAGNOSIS — R42 DIZZINESS AND GIDDINESS: ICD-10-CM

## 2022-06-09 DIAGNOSIS — R73.01 IMPAIRED FASTING GLUCOSE: ICD-10-CM

## 2022-06-09 DIAGNOSIS — E55.9 VITAMIN D DEFICIENCY: ICD-10-CM

## 2022-06-09 LAB
ALBUMIN SERPL-MCNC: 4 G/DL (ref 3.5–5.2)
ALP BLD-CCNC: 75 U/L (ref 40–129)
ALT SERPL-CCNC: 15 U/L (ref 0–40)
ANION GAP SERPL CALCULATED.3IONS-SCNC: 18 MMOL/L (ref 7–16)
AST SERPL-CCNC: 24 U/L (ref 0–39)
BACTERIA: ABNORMAL /HPF
BASOPHILS ABSOLUTE: 0.05 E9/L (ref 0–0.2)
BASOPHILS RELATIVE PERCENT: 1.3 % (ref 0–2)
BILIRUB SERPL-MCNC: 0.3 MG/DL (ref 0–1.2)
BILIRUBIN URINE: NEGATIVE
BLOOD, URINE: NEGATIVE
BUN BLDV-MCNC: 13 MG/DL (ref 6–23)
CALCIUM SERPL-MCNC: 9.5 MG/DL (ref 8.6–10.2)
CHLORIDE BLD-SCNC: 106 MMOL/L (ref 98–107)
CHOLESTEROL, TOTAL: 216 MG/DL (ref 0–199)
CLARITY: NORMAL
CO2: 18 MMOL/L (ref 22–29)
COLOR: YELLOW
CREAT SERPL-MCNC: 1.3 MG/DL (ref 0.7–1.2)
EOSINOPHILS ABSOLUTE: 0.18 E9/L (ref 0.05–0.5)
EOSINOPHILS RELATIVE PERCENT: 4.5 % (ref 0–6)
GFR AFRICAN AMERICAN: >60
GFR NON-AFRICAN AMERICAN: 54 ML/MIN/1.73
GLUCOSE BLD-MCNC: 85 MG/DL (ref 74–99)
GLUCOSE URINE: NEGATIVE MG/DL
HBA1C MFR BLD: 4.8 % (ref 4–5.6)
HCT VFR BLD CALC: 43.3 % (ref 37–54)
HDLC SERPL-MCNC: 58 MG/DL
HEMOGLOBIN: 13.4 G/DL (ref 12.5–16.5)
IMMATURE GRANULOCYTES #: 0.01 E9/L
IMMATURE GRANULOCYTES %: 0.3 % (ref 0–5)
KETONES, URINE: NEGATIVE MG/DL
LDL CHOLESTEROL CALCULATED: 141 MG/DL (ref 0–99)
LEUKOCYTE ESTERASE, URINE: NEGATIVE
LYMPHOCYTES ABSOLUTE: 1.28 E9/L (ref 1.5–4)
LYMPHOCYTES RELATIVE PERCENT: 32.1 % (ref 20–42)
MCH RBC QN AUTO: 29.5 PG (ref 26–35)
MCHC RBC AUTO-ENTMCNC: 30.9 % (ref 32–34.5)
MCV RBC AUTO: 95.2 FL (ref 80–99.9)
MONOCYTES ABSOLUTE: 0.44 E9/L (ref 0.1–0.95)
MONOCYTES RELATIVE PERCENT: 11 % (ref 2–12)
NEUTROPHILS ABSOLUTE: 2.03 E9/L (ref 1.8–7.3)
NEUTROPHILS RELATIVE PERCENT: 50.8 % (ref 43–80)
NITRITE, URINE: NEGATIVE
PDW BLD-RTO: 14.2 FL (ref 11.5–15)
PH UA: 5.5 (ref 5–9)
PLATELET # BLD: 385 E9/L (ref 130–450)
PMV BLD AUTO: 10.6 FL (ref 7–12)
POTASSIUM SERPL-SCNC: 4.3 MMOL/L (ref 3.5–5)
PROTEIN UA: NEGATIVE MG/DL
RBC # BLD: 4.55 E12/L (ref 3.8–5.8)
RBC UA: ABNORMAL /HPF (ref 0–2)
SODIUM BLD-SCNC: 142 MMOL/L (ref 132–146)
SPECIFIC GRAVITY UA: >=1.03 (ref 1–1.03)
TOTAL PROTEIN: 7.3 G/DL (ref 6.4–8.3)
TRIGL SERPL-MCNC: 85 MG/DL (ref 0–149)
TSH SERPL DL<=0.05 MIU/L-ACNC: 3.03 UIU/ML (ref 0.27–4.2)
UROBILINOGEN, URINE: 0.2 E.U./DL
VITAMIN D 25-HYDROXY: 32 NG/ML (ref 30–100)
VLDLC SERPL CALC-MCNC: 17 MG/DL
WBC # BLD: 4 E9/L (ref 4.5–11.5)
WBC UA: ABNORMAL /HPF (ref 0–5)

## 2022-06-17 ENCOUNTER — OFFICE VISIT (OUTPATIENT)
Dept: FAMILY MEDICINE CLINIC | Age: 72
End: 2022-06-17
Payer: MEDICARE

## 2022-06-17 VITALS
OXYGEN SATURATION: 98 % | WEIGHT: 147 LBS | HEART RATE: 77 BPM | HEIGHT: 65 IN | TEMPERATURE: 98 F | DIASTOLIC BLOOD PRESSURE: 80 MMHG | SYSTOLIC BLOOD PRESSURE: 124 MMHG | RESPIRATION RATE: 16 BRPM | BODY MASS INDEX: 24.49 KG/M2

## 2022-06-17 DIAGNOSIS — Z96.651 STATUS POST TOTAL KNEE REPLACEMENT, RIGHT: ICD-10-CM

## 2022-06-17 DIAGNOSIS — Z12.5 SCREENING FOR PROSTATE CANCER: ICD-10-CM

## 2022-06-17 DIAGNOSIS — N52.9 VASCULOGENIC ERECTILE DYSFUNCTION, UNSPECIFIED VASCULOGENIC ERECTILE DYSFUNCTION TYPE: ICD-10-CM

## 2022-06-17 DIAGNOSIS — K21.9 GASTROESOPHAGEAL REFLUX DISEASE WITHOUT ESOPHAGITIS: ICD-10-CM

## 2022-06-17 DIAGNOSIS — E78.2 MIXED HYPERLIPIDEMIA: Primary | ICD-10-CM

## 2022-06-17 DIAGNOSIS — F10.10 ALCOHOL USE DISORDER, MILD, IN CONTROLLED ENVIRONMENT: ICD-10-CM

## 2022-06-17 DIAGNOSIS — R25.2 MUSCLE CRAMPS: ICD-10-CM

## 2022-06-17 DIAGNOSIS — E55.9 VITAMIN D DEFICIENCY: ICD-10-CM

## 2022-06-17 DIAGNOSIS — N18.31 STAGE 3A CHRONIC KIDNEY DISEASE (HCC): ICD-10-CM

## 2022-06-17 DIAGNOSIS — Z12.11 SCREENING FOR COLON CANCER: ICD-10-CM

## 2022-06-17 DIAGNOSIS — R73.01 IMPAIRED FASTING GLUCOSE: ICD-10-CM

## 2022-06-17 PROBLEM — N18.30 CHRONIC RENAL DISEASE, STAGE III (HCC): Status: ACTIVE | Noted: 2022-06-17

## 2022-06-17 PROCEDURE — 99214 OFFICE O/P EST MOD 30 MIN: CPT | Performed by: FAMILY MEDICINE

## 2022-06-17 PROCEDURE — 1124F ACP DISCUSS-NO DSCNMKR DOCD: CPT | Performed by: FAMILY MEDICINE

## 2022-06-17 RX ORDER — SILDENAFIL CITRATE 20 MG/1
20 TABLET ORAL DAILY PRN
Qty: 30 TABLET | Refills: 5 | Status: SHIPPED | OUTPATIENT
Start: 2022-06-17

## 2022-06-17 RX ORDER — ATORVASTATIN CALCIUM 20 MG/1
20 TABLET, FILM COATED ORAL EVERY EVENING
Qty: 90 TABLET | Refills: 1 | Status: SHIPPED
Start: 2022-06-17 | End: 2022-10-17 | Stop reason: SDUPTHER

## 2022-06-17 RX ORDER — ERGOCALCIFEROL 1.25 MG/1
50000 CAPSULE ORAL WEEKLY
Qty: 12 CAPSULE | Refills: 1 | Status: SHIPPED | OUTPATIENT
Start: 2022-06-17

## 2022-06-17 RX ORDER — OMEPRAZOLE 40 MG/1
40 CAPSULE, DELAYED RELEASE ORAL DAILY
Qty: 90 CAPSULE | Refills: 1 | Status: SHIPPED | OUTPATIENT
Start: 2022-06-17 | End: 2022-10-26

## 2022-06-17 RX ORDER — NAPROXEN 500 MG/1
500 TABLET ORAL 2 TIMES DAILY WITH MEALS
Qty: 60 TABLET | Refills: 5 | Status: SHIPPED | OUTPATIENT
Start: 2022-06-17

## 2022-06-17 ASSESSMENT — ENCOUNTER SYMPTOMS
CONSTIPATION: 0
BACK PAIN: 0
COUGH: 0
WHEEZING: 0
DIARRHEA: 0
ABDOMINAL PAIN: 0
SHORTNESS OF BREATH: 0
NAUSEA: 0
VOMITING: 0

## 2022-06-17 NOTE — PROGRESS NOTES
22  Omid Betts : 1950 Sex: male  Age: 71 y.o. Chief Complaint   Patient presents with    Leg Pain     cramping in both legs     HPI:  71 y.o. male patient presents today for 6 month(s) follow up of chronic medical conditions, medication refills and FBW results. Patient's chart, medical, surgical and medication history all reviewed. Hyperlipidemia  The 10-year ASCVD risk score (Neeraj Peña, et al., 2013) is: 17.9%    Values used to calculate the score:      Age: 71 years      Sex: Male      Is Non- : No      Diabetic: No      Tobacco smoker: No      Systolic Blood Pressure: 633 mmHg      Is BP treated: No      HDL Cholesterol: 58 mg/dL      Total Cholesterol: 216 mg/dL    Fatigue  Patient complains of fatigue. Symptoms began several months ago. Sentinal symptom the patient feels fatigue began with: none. Symptoms of his fatigue have been change in appetite, decreased libido, fatigue with paradoxical insomnia, general malaise and hypersomnolence. Patient describes the following psychologic symptoms: none. Patient denies fever, significant change in weight, symptoms of arthritis, exercise intolerance, unusual rashes, cold intolerance, constipation and change in hair texture. and witnessed or suspected sleep apnea. Symptoms have gradually worsened. Severity has been moderate. Previous visits for this problem: yes, last seen 1 year ago by me. Previous labs have provided no benefit. Patient having intermittent episodes of vertigo for the last 3-4 years. States they come on all of a sudden- will turn his head quickly or bend in a certain way prior to onset- and then he has to drop to the ground and sit there for a while before it stops. Becomes very nauseated and diaphoretic. Has seen ENT and Audiology without benefit. Also having significant palpitations at bedtime. Has seen cardiology.   Cardiac event monitor only showing few PACs and PVCs, but nothing significant at night when he feels this. Last visit he noted difficulty with MOSHER while out in the yard cutting firewood or doing an activity around the house. Feels his heart \"pounding\" at night. Has had episodes of \"cold sweats\" but no fever or chills. No true chest pain. Intermittent leg cramping and decreased appetite. He has been seen by Dr. Jess Knox in the last 6 months without any changes. ROS:  Review of Systems   Constitutional: Positive for activity change, appetite change and fatigue. Negative for chills, diaphoresis, fever and unexpected weight change. Respiratory: Negative for cough, shortness of breath and wheezing. Cardiovascular: Negative for chest pain and palpitations. Gastrointestinal: Negative for abdominal pain, constipation, diarrhea, nausea and vomiting. Musculoskeletal: Positive for arthralgias, gait problem and myalgias (leg cramps). Negative for back pain. Skin: Negative for rash. Neurological: Negative for dizziness and headaches. Psychiatric/Behavioral: Positive for sleep disturbance. Negative for dysphoric mood. The patient is not nervous/anxious. All other systems reviewed and are negative. Current Outpatient Medications on File Prior to Visit   Medication Sig Dispense Refill    ibuprofen (ADVIL;MOTRIN) 200 MG tablet Take 200 mg by mouth every 6 hours as needed for Pain      senna (SENOKOT) 8.6 MG tablet Take 1 tablet by mouth        No current facility-administered medications on file prior to visit.        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    heterozygous C282y gene mutation, neg H63D gene mutation; Dr Hitesh Dudley follows    DDD (degenerative disc disease), cervical     Diverticulosis     DJD (degenerative joint disease)     C/T/L Spines    GERD (gastroesophageal reflux disease)     H/O calcium pyrophosphate deposition disease (CPPD)     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 Levi Valdez     Past Surgical History:   Procedure Laterality Date    CARDIAC CATHETERIZATION  08/19/2014    Normal    COLONOSCOPY  2013    Dr. Juan Carlos Miranda- repeat 5 years    CYST INCISION AND DRAINAGE      Pilonidal    LIPOMA RESECTION Left 1970's    under breast; Dr. Hilario Randall  09/17/2014    [place]    OTHER SURGICAL HISTORY  02/17/2017    Euflexxa Injection Right knee - Lopez X3    TOTAL KNEE ARTHROPLASTY Right 10/18/2021    RIGHT KNEE TOTAL ROBOTIC CRISPIN ASSISTED ARTHROPLASTY  PNB performed by [facility] at [facility] OR     Family History   Problem Relation Age of Onset    Heart Disease Mother         A fib    Atrial Fibrillation Mother     Heart Disease Father     Heart Attack Father     Other Brother         Hemachromatosis     Other Sister         MS     Social History     Socioeconomic History    Marital status:      Spouse name: Not on file    Number of children: Not on file    Years of education: Not on file    Highest education level: Not on file   Occupational History    Occupation: [employer], mows grass,etc   Tobacco Use    Smoking status: Never Smoker    Smokeless tobacco: Former User     Types: Chew    Tobacco comment: snuff user daily   Vaping Use    Vaping Use: Never used   Substance and Sexual Activity    Alcohol use: Yes     Comment: 5-6 beers a week    Drug use: No    Sexual activity: Not on file   Other Topics Concern    Not on file   Social History Narrative    Not on file     Social Determinants of Health     Financial Resource Strain: Low Risk     Difficulty of Paying Living Expenses: Not hard at all   Food Insecurity: No Food Insecurity    Worried About Running Out of Food in the Last Year: Never true    [address] in the Last Year: Never true   Transportation Needs:     Lack of Transportation (Medical): Not on file    Lack of Transportation (Non-Medical): Not on file   Physical Activity:     Days of Exercise per Week: Not on file    Minutes of Exercise per Session: Not on file   Stress:     Feeling of Stress : Not on file   Social Connections:     Frequency of Communication with Friends and Family: Not on file    Frequency of Social Gatherings with Friends and Family: Not on file    Attends [employer] Services: Not on file    Active Member of [address] [place] [address] [employer] or Organizations: Not on file    Attends Club or Organization Meetings: Not on file    Marital Status: Not on file   Intimate Partner Violence:     Fear of Current or Ex-Partner: Not on file    Emotionally Abused: Not on file    Physically Abused: Not on file    Sexually Abused: Not on file   Housing Stability:     Unable to Pay for Housing in the Last Year: Not on file    Number of [place] in the Last Year: Not on file    Unstable Housing in the Last Year: Not on file       Vitals:    06/17/22 1126   BP: 124/80   Pulse: 77   Resp: 16   Temp: 98 °F (36.7 °C)   TempSrc: Temporal   SpO2: 98%   Weight: 147 lb (66.7 kg)   Height: 5' 5\" (1.651 m)       Physical Exam:  Physical Exam  Vitals and nursing note reviewed. Constitutional:       General: He is not in acute distress. Appearance: Normal appearance. He is well-developed and normal weight. He is not ill-appearing. HENT:      Head: Normocephalic and atraumatic.       Right Ear: Hearing and external ear normal.      Left Ear: Hearing and external ear normal.      Nose:      Comments: Patient wearing mask  Eyes:      General: Lids are normal. No scleral icterus. Extraocular Movements: Extraocular movements intact. Conjunctiva/sclera: Conjunctivae normal.   Neck:      Thyroid: No thyromegaly. Vascular: No carotid bruit. Cardiovascular:      Rate and Rhythm: Normal rate and regular rhythm. Heart sounds: Normal heart sounds. No murmur heard. Pulmonary:      Effort: Pulmonary effort is normal. No respiratory distress. Breath sounds: Normal breath sounds. No wheezing. Musculoskeletal:         General: No tenderness or deformity. Normal range of motion. Cervical back: Normal range of motion and neck supple. Right lower leg: No edema. Left lower leg: No edema. Lymphadenopathy:      Cervical: No cervical adenopathy. Skin:     General: Skin is warm and dry. Findings: No rash. Neurological:      General: No focal deficit present. Mental Status: He is alert and oriented to person, place, and time. Gait: Gait normal.   Psychiatric:         Mood and Affect: Mood and affect normal.         Speech: Speech normal.         Behavior: Behavior normal.         Thought Content:  Thought content normal.         Labs:  CBC with Differential:    Lab Results   Component Value Date    WBC 4.0 06/09/2022    RBC 4.55 06/09/2022    HGB 13.4 06/09/2022    HCT 43.3 06/09/2022     06/09/2022    MCV 95.2 06/09/2022    MCH 29.5 06/09/2022    MCHC 30.9 06/09/2022    RDW 14.2 06/09/2022    LYMPHOPCT 32.1 06/09/2022    MONOPCT 11.0 06/09/2022    BASOPCT 1.3 06/09/2022    MONOSABS 0.44 06/09/2022    LYMPHSABS 1.28 06/09/2022    EOSABS 0.18 06/09/2022    BASOSABS 0.05 06/09/2022     CMP:    Lab Results   Component Value Date     06/09/2022    K 4.3 06/09/2022     06/09/2022    CO2 18 06/09/2022    BUN 13 06/09/2022    CREATININE 1.3 06/09/2022    GFRAA >60 06/09/2022    LABGLOM 54 06/09/2022 GLUCOSE 85 06/09/2022    PROT 7.3 06/09/2022    LABALBU 4.0 06/09/2022    CALCIUM 9.5 06/09/2022    BILITOT 0.3 06/09/2022    ALKPHOS 75 06/09/2022    AST 24 06/09/2022    ALT 15 06/09/2022     Uric Acid:  No results found for: Derenda Bilberry  HgBA1c:    Lab Results   Component Value Date    LABA1C 4.8 06/09/2022     Microalbumen/Creatinine ratio:  No components found for: RUCREAT  FLP:    Lab Results   Component Value Date    TRIG 85 06/09/2022    HDL 58 06/09/2022    LDLCALC 141 06/09/2022    LABVLDL 17 06/09/2022     TSH:    Lab Results   Component Value Date    TSH 3.030 06/09/2022     VITAMIN B12: No components found for: B12  IRON:    Lab Results   Component Value Date    IRON 110 04/26/2022     FERRITIN:    Lab Results   Component Value Date    FERRITIN 283 04/26/2022          Assessment and Plan:  Ricky Tucker was seen today for leg pain. Diagnoses and all orders for this visit:    Mixed hyperlipidemia  -     atorvastatin (LIPITOR) 20 MG tablet; Take 1 tablet by mouth every evening  -     CBC with Auto Differential; Future  -     Comprehensive Metabolic Panel; Future  -     Lipid Panel; Future  -     TSH; Future  -     Vitamin D 25 Hydroxy; Future  -     Urinalysis; Future  -     Magnesium; Future  Worsening since stopping Lipitor. Did not improve muscle cramps. Will restart. Labs reivewed in detail. Repeat in 6 months. Stage 3a chronic kidney disease (HCC)  -     CBC with Auto Differential; Future  -     Comprehensive Metabolic Panel; Future  -     Lipid Panel; Future  -     TSH; Future  -     Vitamin D 25 Hydroxy; Future  -     Urinalysis; Future  -     Magnesium; Future  Improved    Gastroesophageal reflux disease without esophagitis  -     omeprazole (PRILOSEC) 40 MG delayed release capsule; Take 1 capsule by mouth Daily    Status post total knee replacement, right  -     naproxen (NAPROSYN) 500 MG tablet;  Take 1 tablet by mouth 2 times daily (with meals)    Vasculogenic erectile dysfunction, unspecified vasculogenic erectile dysfunction type  -     sildenafil (REVATIO) 20 MG tablet; Take 1 tablet by mouth daily as needed (ED)    Muscle cramps  -     Magnesium; Future  Discussed starting Mag Oxide 500 mg nightly. Increase fluid intake. Vitamin D deficiency  -     vitamin D (ERGOCALCIFEROL) 1.25 MG (72189 UT) CAPS capsule; Take 1 capsule by mouth once a week  -     Vitamin D 25 Hydroxy; Future    Alcohol use disorder, mild, in controlled environment  5-8 beer/day per wife. Screening for colon cancer  -     Fecal DNA Colorectal cancer screening (Cologuard)  Patient is low risk. No family history of colon cancer and no history of bowel issues or melena. Patient would like to start with a Cologuard instead of colonoscopy. Explained that he will need colonoscopy if Cologuard is positive. Patient stated understanding. Order faxed to [facility]. Impaired fasting glucose  -     Hemoglobin A1C; Future    Screening for prostate cancer  -     PSA Screening; Future        Return in about 6 months (around 12/17/2022), or if symptoms worsen or fail to improve, for AWV.       Seen By:  Luberta Libman, DO

## 2022-08-18 ENCOUNTER — TELEPHONE (OUTPATIENT)
Dept: ORTHOPEDIC SURGERY | Age: 72
End: 2022-08-18

## 2022-08-18 NOTE — TELEPHONE ENCOUNTER
Phoned Pt to let them know they would coming to the new location for their appt. Advised to call back to let us know they got the message.

## 2022-08-26 ENCOUNTER — APPOINTMENT (OUTPATIENT)
Dept: GENERAL RADIOLOGY | Age: 72
End: 2022-08-26
Payer: COMMERCIAL

## 2022-08-26 ENCOUNTER — HOSPITAL ENCOUNTER (EMERGENCY)
Age: 72
Discharge: HOME | End: 2022-08-26
Payer: COMMERCIAL

## 2022-08-26 VITALS
TEMPERATURE: 97.8 F | HEART RATE: 64 BPM | BODY MASS INDEX: 27.83 KG/M2 | SYSTOLIC BLOOD PRESSURE: 186 MMHG | OXYGEN SATURATION: 100 % | WEIGHT: 163 LBS | DIASTOLIC BLOOD PRESSURE: 70 MMHG | RESPIRATION RATE: 14 BRPM | HEIGHT: 64 IN

## 2022-08-26 DIAGNOSIS — Z23 NEED FOR TETANUS BOOSTER: ICD-10-CM

## 2022-08-26 DIAGNOSIS — I10 ELEVATED BLOOD PRESSURE READING IN OFFICE WITH DIAGNOSIS OF HYPERTENSION: ICD-10-CM

## 2022-08-26 DIAGNOSIS — S61.209A FINGER AVULSION, INITIAL ENCOUNTER: Primary | ICD-10-CM

## 2022-08-26 PROCEDURE — 90471 IMMUNIZATION ADMIN: CPT | Performed by: PHYSICIAN ASSISTANT

## 2022-08-26 PROCEDURE — 6360000002 HC RX W HCPCS: Performed by: PHYSICIAN ASSISTANT

## 2022-08-26 PROCEDURE — 73130 X-RAY EXAM OF HAND: CPT

## 2022-08-26 PROCEDURE — 2500000003 HC RX 250 WO HCPCS: Performed by: PHYSICIAN ASSISTANT

## 2022-08-26 PROCEDURE — 99284 EMERGENCY DEPT VISIT MOD MDM: CPT

## 2022-08-26 PROCEDURE — 90714 TD VACC NO PRESV 7 YRS+ IM: CPT | Performed by: PHYSICIAN ASSISTANT

## 2022-08-26 PROCEDURE — 6370000000 HC RX 637 (ALT 250 FOR IP): Performed by: PHYSICIAN ASSISTANT

## 2022-08-26 RX ORDER — TETANUS AND DIPHTHERIA TOXOIDS ADSORBED 2; 2 [LF]/.5ML; [LF]/.5ML
0.5 INJECTION INTRAMUSCULAR ONCE
Status: COMPLETED | OUTPATIENT
Start: 2022-08-26 | End: 2022-08-26

## 2022-08-26 RX ORDER — CEPHALEXIN 500 MG/1
500 CAPSULE ORAL ONCE
Status: COMPLETED | OUTPATIENT
Start: 2022-08-26 | End: 2022-08-26

## 2022-08-26 RX ORDER — GINSENG 100 MG
CAPSULE ORAL ONCE
Status: DISCONTINUED | OUTPATIENT
Start: 2022-08-26 | End: 2022-08-26 | Stop reason: HOSPADM

## 2022-08-26 RX ORDER — CEPHALEXIN 500 MG/1
500 CAPSULE ORAL 4 TIMES DAILY
Qty: 28 CAPSULE | Refills: 0 | Status: SHIPPED | OUTPATIENT
Start: 2022-08-26 | End: 2022-09-02

## 2022-08-26 RX ORDER — ACETAMINOPHEN 650 MG
TABLET, EXTENDED RELEASE ORAL ONCE
Status: DISCONTINUED | OUTPATIENT
Start: 2022-08-26 | End: 2022-08-26 | Stop reason: HOSPADM

## 2022-08-26 RX ADMIN — TRANEXAMIC ACID 300 MG: 1 INJECTION, SOLUTION INTRAVENOUS at 10:14

## 2022-08-26 RX ADMIN — CEPHALEXIN 500 MG: 500 CAPSULE ORAL at 09:45

## 2022-08-26 RX ADMIN — TETANUS AND DIPHTHERIA TOXOIDS ADSORBED 0.5 ML: 2; 2 INJECTION INTRAMUSCULAR at 09:46

## 2022-08-26 ASSESSMENT — PAIN DESCRIPTION - ORIENTATION: ORIENTATION: LEFT

## 2022-08-26 ASSESSMENT — PAIN SCALES - GENERAL: PAINLEVEL_OUTOF10: 5

## 2022-08-26 ASSESSMENT — PAIN - FUNCTIONAL ASSESSMENT: PAIN_FUNCTIONAL_ASSESSMENT: 0-10

## 2022-08-26 ASSESSMENT — PAIN DESCRIPTION - LOCATION: LOCATION: FINGER (COMMENT WHICH ONE)

## 2022-08-26 NOTE — ED PROVIDER NOTES
[address] [place] [address]  Department of Emergency Medicine   ED  Encounter Note  Admit Date/RoomTime: 2022  8:43 AM  ED Room: 34/34    NAME: Jaren Mccormick  : 1950  MRN: [id number]     Chief Complaint:  Laceration (Left 3rd digit laceration)    History of Present Illness        Jaren Mccormick is a 71 y.o. old male presenting to the emergency department by private vehicle, for traumatic Left Middle Finger pain which occured 1 hour(s) prior to arrival.  The complaint is due to a direct blow to the injured area while at work}. 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 \"  Since onset the symptoms have been stable. Patient has no prior history of pain/injury with regards to today's visit. His pain is aggraveated by pressure on or palpation of painful area and relieved by nothing. He is right handed. Patient is not on any blood thinners and states that the bleeding is controlled and it is \"not even bleeding anymore. \"  Patient denies any radiation of any pain. Patient states that nothing seems to make it better or worse. Tetanus Status: more than 10 years ago. ROS   Pertinent positives and negatives are stated within HPI, all other systems reviewed and are negative. 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 Surgical History:  has a past surgical history that includes Colonoscopy ();  Mitral valve replacement (2014); lipoma resection (Left, 1970's); Breast cyst incision and drainage; Cardiac catheterization (08/19/2014); other surgical history (02/17/2017); and Total knee arthroplasty (Right, 10/18/2021). Social History:  reports that he has never smoked. He has quit using smokeless tobacco.  His smokeless tobacco use included chew. He reports current alcohol use. He reports that he does not use drugs. Family History: family history includes Atrial Fibrillation in his mother; Heart Attack in his father; Heart Disease in his father and mother; Other in his brother and sister. Allergies: Bee venom and Poison ivy extract    Physical Exam   Oxygen Saturation Interpretation: Normal.        ED Triage Vitals [08/26/22 0851]   BP Temp Temp Source Heart Rate Resp SpO2 Height Weight   (!) 201/101 97.8 °F (36.6 °C) Temporal 64 14 100 % 5' 3.5\" (1.613 m) 163 lb (73.9 kg)         Constitutional:  Alert, development consistent with age. Neck:  Normal ROM. Supple. Non-tender. Fingers:  Left Middle finger DIP joint dorsal aspect            Tenderness:  mild. Swelling: None. Deformity: Large area of the pad of the finger missing due to avulsion            ROM: full range with pain. Skin: Large area of avulsion noted. Neurovascular: Motor deficit: none. Sensory deficit:   none. Pulse deficit: none. Capillary refill: normal.  Left Hand: all metacarpals. Tenderness: none. Swelling: None. Deformity: no.             Skin:  no wounds, erythema, or swelling. Left Wrist:               Tenderness:  none. Swelling: None. Deformity: no deformity observed/palpated. ROM: full range of motion. Skin:  no wounds, erythema, or swelling. Lymphatics: No lymphangitis or adenopathy noted. Neurological:  Oriented. Motor functions intact. t.         Lab / Imaging Results   (All laboratory and radiology results have been personally reviewed by myself)  Labs:  No results found for this visit on 08/26/22. Imaging: All Radiology results interpreted by Radiologist unless otherwise noted. XR HAND LEFT (MIN 3 VIEWS)   Final Result   Amputation of distal soft tissue at the middle finger with no evidence of   open fracture. Multi centric osteoarthritis with chondrocalcinosis. ED Course / Medical Decision Making     Medications   bacitracin ointment (has no administration in time range)   povidone-iodine (BETADINE) 10 % external solution (has no administration in time range)   diptheria-tetanus toxoids [facility]) 2-2 LF/0.5ML injection 0.5 mL (0.5 mLs IntraMUSCular Given 8/26/22 0946)   cephALEXin (KEFLEX) capsule 500 mg (500 mg Oral Given 8/26/22 0945)   tranexamic acid (CYKLOKAPRON) 300 mg FOR IRRIGATION (300 mg Irrigation New Bag 8/26/22 1014)        Consult(s):   None    Procedure(s): Wound was thoroughly soaked in Betadine and sterile saline and then irrigated. TXA was applied to area for 20+ minutes on pressure dressing to stop bleeding. Hemostasis achieved. Sterile dressing applied. Patient tolerated procedure well    MDM:     patient is a 58-year-old male that is presenting after an avulsion injury to his left middle finger that occurred at work. Workmen's Comp. papers will be filled. Patient has full range of motion including flexion and extension. No nail involvement after evaluating the patient. There is an avulsion to the pad of the middle finger. Bleeding is very controlled. Imaging was obtained based on high suspicion for fracture / bony abnormality, dislocation, retained foreign body, joint effusion as per history/physical findings. No bone involvement. Due to the dirty nature patient will be placed on antibiotics, sterile dressing applied after thorough irrigation please see procedure note above. Patient's tetanus was up-to-date. First dose of antibiotics given. Patient was told alternate Tylenol and ibuprofen every 6-8 hours with food. Patient was told to take the antibiotics in full. Workmen's Comp. papers filled. Patient will have a drug protocol which she will complete after this. Patient was advised of worsening signs symptoms such as fevers, chills, swelling or circumferential swelling around the finger is concern for infection to return back to the emergency department. Patient voiced understanding and agreed with the plan and management will follow up with his family doctor. Patient is neurovascular and sensory intact has a good radial pulse. Patient is vitally stable and ready for outpatient follow-up. Patient was educated that his blood pressure was slightly elevated and he states that he has not been compliant in taking his medications. Patient currently having no chest pain, shortness of breath, palpitations, dizziness. Patient was educated that having high blood pressure puts him at risk of a heart attack and stroke. Patient was highly educated even though he is asymptomatic at this time to follow-up with his family doctor. Patient voiced understanding and agreed with the plan of management. Plan is subsequently for symptom control, limited use and immobilization with outpatient follow-up with pcp as instructed in d/c instructions. Patient was explicitly instructed on specific signs and symptoms on which to return to the emergency room for. Patient was instructed to return to the ER for any new or worsening symptoms. Additional discharge instructions were given verbally. All questions were answered. Patient is comfortable and agreeable with discharge plan. Patient in no acute distress and non-toxic in appearance. Plan of Care/Counseling:  Leroy Mendes PA-C reviewed today's visit with the patient in addition to providing specific details for the plan of care and counseling regarding the diagnosis and prognosis.   Questions are answered at this time and are agreeable with the plan. Assessment      1. Finger avulsion, initial encounter    2. Need for tetanus booster    3. Elevated blood pressure reading in office with diagnosis of hypertension      Plan   Discharged home. Patient condition is stable    New Medications     New Prescriptions    CEPHALEXIN (KEFLEX) 500 MG CAPSULE    Take 1 capsule by mouth 4 times daily for 7 days     Electronically signed by Ck Crhistopher PA-C   DD: 8/26/22  **This report was transcribed using voice recognition software. Every effort was made to ensure accuracy; however, inadvertent computerized transcription errors may be present.   END OF ED PROVIDER NOTE       Ck Christopher PA-C  08/26/22 6729

## 2022-08-26 NOTE — Clinical Note
Evelyn He was seen and treated in our emergency department on 8/26/2022. He may return to work on 08/29/2022. If you have any questions or concerns, please don't hesitate to call.       Elisabet Keene PA-C

## 2022-09-07 ENCOUNTER — OFFICE VISIT (OUTPATIENT)
Dept: FAMILY MEDICINE CLINIC | Age: 72
End: 2022-09-07
Payer: MEDICARE

## 2022-09-07 VITALS
SYSTOLIC BLOOD PRESSURE: 158 MMHG | HEART RATE: 67 BPM | WEIGHT: 149.8 LBS | BODY MASS INDEX: 25.57 KG/M2 | OXYGEN SATURATION: 97 % | DIASTOLIC BLOOD PRESSURE: 88 MMHG | TEMPERATURE: 97.9 F | HEIGHT: 64 IN

## 2022-09-07 DIAGNOSIS — I10 PRIMARY HYPERTENSION: Primary | ICD-10-CM

## 2022-09-07 PROCEDURE — 99213 OFFICE O/P EST LOW 20 MIN: CPT | Performed by: FAMILY MEDICINE

## 2022-09-07 PROCEDURE — 93010 ELECTROCARDIOGRAM REPORT: CPT | Performed by: FAMILY MEDICINE

## 2022-09-07 PROCEDURE — 93000 ELECTROCARDIOGRAM COMPLETE: CPT | Performed by: FAMILY MEDICINE

## 2022-09-07 PROCEDURE — 1124F ACP DISCUSS-NO DSCNMKR DOCD: CPT | Performed by: FAMILY MEDICINE

## 2022-09-07 RX ORDER — LISINOPRIL 10 MG/1
10 TABLET ORAL DAILY
Qty: 30 TABLET | Refills: 5 | Status: SHIPPED
Start: 2022-09-07 | End: 2022-10-17 | Stop reason: SDUPTHER

## 2022-09-07 ASSESSMENT — ENCOUNTER SYMPTOMS
CONSTIPATION: 0
PHOTOPHOBIA: 0
BLOOD IN STOOL: 0
BACK PAIN: 0
ABDOMINAL PAIN: 0
SORE THROAT: 0
VOMITING: 0
SHORTNESS OF BREATH: 0
DIARRHEA: 0
COUGH: 0
NAUSEA: 0

## 2022-09-07 NOTE — PROGRESS NOTES
urgency. Musculoskeletal:  Negative for back pain and myalgias. Skin: Negative. Neurological:  Negative for dizziness, tremors, weakness and headaches. Hematological:  Does not bruise/bleed easily. Psychiatric/Behavioral:  Negative for hallucinations and suicidal ideas. The patient is nervous/anxious. All other systems reviewed and are negative.        Current Outpatient Medications:     lisinopril (PRINIVIL;ZESTRIL) 10 MG tablet, Take 1 tablet by mouth daily, Disp: 30 tablet, Rfl: 5    vitamin D (ERGOCALCIFEROL) 1.25 MG (63705 UT) CAPS capsule, Take 1 capsule by mouth once a week, Disp: 12 capsule, Rfl: 1    omeprazole (PRILOSEC) 40 MG delayed release capsule, Take 1 capsule by mouth Daily, Disp: 90 capsule, Rfl: 1    sildenafil (REVATIO) 20 MG tablet, Take 1 tablet by mouth daily as needed (ED), Disp: 30 tablet, Rfl: 5    naproxen (NAPROSYN) 500 MG tablet, Take 1 tablet by mouth 2 times daily (with meals), Disp: 60 tablet, Rfl: 5    atorvastatin (LIPITOR) 20 MG tablet, Take 1 tablet by mouth every evening, Disp: 90 tablet, Rfl: 1    ibuprofen (ADVIL;MOTRIN) 200 MG tablet, Take 200 mg by mouth every 6 hours as needed for Pain, Disp: , Rfl:     senna (SENOKOT) 8.6 MG tablet, Take 1 tablet by mouth , Disp: , Rfl:    Patient Active Problem List   Diagnosis    MVP (mitral valve prolapse)    Mitral regurgitation    S/P MVR (mitral valve repair)    Diastolic dysfunction    Chronic pain of right knee    Plantar fasciitis    Mixed hyperlipidemia    Primary osteoarthritis of right knee    Status post total knee replacement, right    Chronic renal disease, stage III [facility]) [301527]    Primary hypertension     Past Medical History:   Diagnosis Date    Arthritis 02/2016    Left hand with exuberant soft tissue calcification particularly at second/third MCPs    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    heterozygous C282y gene mutation, neg H63D gene mutation; Dr Lesli Jacome follows    DDD (degenerative disc disease), cervical     Diverticulosis     DJD (degenerative joint disease)     C/T/L Spines    GERD (gastroesophageal reflux disease)     H/O calcium pyrophosphate deposition disease (CPPD)     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 Jevon Muse     Past Surgical History:   Procedure Laterality Date    CARDIAC CATHETERIZATION  08/19/2014    Normal    COLONOSCOPY  2013    Dr. Pema Dos Santos- repeat 5 years    CYST INCISION AND DRAINAGE      Pilonidal    LIPOMA RESECTION Left 1970's    under breast; Dr. Sheyla Fraser  09/17/2014    [id number] [employer]  02/17/2017    Euflexxa Injection Right knee - Lopez X3    TOTAL KNEE ARTHROPLASTY Right 10/18/2021    RIGHT KNEE TOTAL ROBOTIC CRISPIN ASSISTED ARTHROPLASTY  PNB performed by Yair Ingram MD at [address] History     Socioeconomic History    Marital status:      Spouse name: Not on file    Number of children: Not on file    Years of education: Not on file    Highest education level: Not on file   Occupational History    Occupation: [employer], mows grass,etc   Tobacco Use    Smoking status: Never    Smokeless tobacco: Former     Types: Chew    Tobacco comments:     snuff user daily   Vaping Use    Vaping Use: Never used   Substance and Sexual Activity    Alcohol use: Yes     Comment: 5-6 beers a week    Drug use: No    Sexual activity: Not on file   Other Topics Concern    Not on file   Social History Narrative    Not on file     Social Determinants of Health     Financial Resource Strain: Low Risk     Difficulty of Paying Living Expenses: Not hard at all   Food Insecurity: No Food Insecurity    Worried About Running Out of Food in the Last Year: Never true    Ran Out of Food in the Last Year: Never true   Transportation Needs: Not on file   Physical Activity: Not on file   Stress: Not on file   Social Connections: Not on file   Intimate Partner Violence: Not on file   Housing Stability: Not on file     Family History   Problem Relation Age of Onset    Heart Disease Mother         A fib    Atrial Fibrillation Mother     Heart Disease Father     Heart Attack Father     Other Brother         Hemachromatosis     Other Sister         MS      There are no preventive care reminders to display for this patient. There are no preventive care reminders to display for this patient. There are no preventive care reminders to display for this patient. Health Maintenance Due   Topic    Shingles vaccine (1 of 2)    DTaP/Tdap/Td vaccine (1 - Tdap)      Health Maintenance   Topic Date Due    Shingles vaccine (1 of 2) Never done    COVID-19 Vaccine (4 - Booster for Moderna series) 04/12/2022    DTaP/Tdap/Td vaccine (1 - Tdap) 08/27/2022    Flu vaccine (1) 09/01/2022    Depression Screen  04/26/2023    Lipids  06/09/2023    Colorectal Cancer Screen  10/07/2024    Pneumococcal 65+ years Vaccine  Completed    Hepatitis C screen  Completed    Hepatitis A vaccine  Aged Out    Hepatitis B vaccine  Aged Out    Hib vaccine  Aged Out    Meningococcal (ACWY) vaccine  Aged Out      There are no preventive care reminders to display for this patient. There are no preventive care reminders to display for this patient. BP (!) 158/88 (Site: Right Upper Arm, Position: Sitting)   Pulse 67   Temp 97.9 °F (36.6 °C) (Temporal)   Ht 5' 3.5\" (1.613 m)   Wt 149 lb 12.8 oz (67.9 kg)   SpO2 97%   BMI 26.12 kg/m²     Objective   Physical Exam  Vitals reviewed. HENT:      Head: Normocephalic and atraumatic. Eyes:      General: No scleral icterus. Extraocular Movements: Extraocular movements intact. Conjunctiva/sclera: Conjunctivae normal.      Pupils: Pupils are equal, round, and reactive to light. Neck:      Thyroid: No thyromegaly. Cardiovascular:      Rate and Rhythm: Normal rate and regular rhythm. Heart sounds: Normal heart sounds. No murmur heard. Pulmonary:      Effort: Pulmonary effort is normal.      Breath sounds: Normal breath sounds. No rales. Abdominal:      General: Bowel sounds are normal. There is no distension. Palpations: Abdomen is soft. Tenderness: There is no abdominal tenderness. Musculoskeletal:         General: Normal range of motion. Cervical back: Neck supple. Right lower leg: No edema. Left lower leg: No edema. Lymphadenopathy:      Cervical: No cervical adenopathy. Skin:     General: Skin is warm and dry. Findings: No erythema or rash. Neurological:      Mental Status: He is alert and oriented to person, place, and time. Cranial Nerves: No cranial nerve deficit. Psychiatric:         Judgment: Judgment normal.            EKG  Ventricular rate of 63 bpm, MI interval 170 with QTC of 427 and QRS duration of 101. Rhythm sinus with leftward axis. No acute ST segment or T wave changes. Deep S wave noted in lead III without reciprocal changes. Denies any other issues at this time. An electronic signature was used to authenticate this note.     --Kellie Mcgowan, DO

## 2022-10-13 ENCOUNTER — TELEPHONE (OUTPATIENT)
Dept: PHARMACY | Facility: CLINIC | Age: 72
End: 2022-10-13

## 2022-10-13 NOTE — TELEPHONE ENCOUNTER
[facility] CLINICAL PHARMACY: ADHERENCE REVIEW  Identified care gap per [employer]: [facility] : ACE/ARB and Statin adherence    Last Visit: 9/7/22    Patient found in Outcomes MTM and is currently eligible for TIP    ASSESSMENT  ACE/ARB ADHERENCE    Insurance Records claims through 9/24/22  YTD Ari Amador = Filled only once; Potential Fail Date: 10/30/22 ):   LISINOPRIL   TAB 10MG last filled on n/a for 30 day supply. Next refill due: 10/7/22    Per  [facility] Portal:  LISINOPRIL   TAB 10MG last filled on 9/7/22 for 30 day supply. BP Readings from Last 3 Encounters:   09/07/22 (!) 158/88   08/26/22 (!) 186/70   06/17/22 124/80     Estimated Creatinine Clearance: 43 mL/min (A) (based on SCr of 1.3 mg/dL (H)). [id number] Records claims through 9/24/22 (; YTD PDC = Filled only once; Potential Fail Date: 10/24/22 ):   ATORVASTATIN TAB 20MG last filled on n/a for 90 day supply. Next refill due: 9/15/22    Per  [facility] Portal:  ATORVASTATIN TAB 20MG last filled on 6/17/22 for 90 day supply. Lab Results   Component Value Date    CHOL 216 (H) 06/09/2022    TRIG 85 06/09/2022    HDL 58 06/09/2022    LDLCALC 141 (H) 06/09/2022     ALT   Date Value Ref Range Status   06/09/2022 15 0 - 40 U/L Final     AST   Date Value Ref Range Status   06/09/2022 24 0 - 39 U/L Final     The 10-year ASCVD risk score (Ashley CALVILLO, et al., 2019) is: 30%    Values used to calculate the score:      Age: 71 years      Sex: Male      Is Non- : No      Diabetic: No      Tobacco smoker: No      Systolic Blood Pressure: 192 mmHg      Is BP treated: Yes      HDL Cholesterol: 58 mg/dL      Total Cholesterol: 216 mg/dL     PLAN  The following are interventions that have been identified:   - Patient overdue refilling Atorvastatin and Lisinopril and active on home medication list.   - Patient eligible for TIP in Outcomes [facility]    1st fill for both medications.    Left VM for patient to call back if still taking, past due  Need to complete 2 $75 tips for refills  Ask about changing Lisinopril to 90ds    Future Appointments   Date Time Provider Belkis Tamiko   10/26/2022  8:00 AM Dominique Davidson MD [facility]   1/6/2023  2:15 PM Mónica Lynch  [address]   [address] [facility] free: [phone]

## 2022-10-13 NOTE — LETTER
[place]  [address], [address]        [facility] [address] [place] [id number]           10/14/22     Dear Gadiel Campos,    We tried to reach you recently regarding your Atorvastatin 20mg and Lisinopril 10mg, but were unable to reach you on the telephone. We have on file that you are currently taking Atorvastatin 20mg-take one tablet by mouth every evening and Lisinopril 10mg- take one tablet by mouth daily. If you are no longer taking or taking differently, please call us at the number below so that we can discuss this and update your medication profile. It appears that this medication has not been filled at proper times. We are worried you might be missing doses or not taking it as directed. It is important that you take your medications regularly and try not to miss a single dose. Some ways to help you remember to take and refill your medications are to:  · Use a pill box, set an alarm, and/or keep your medication near something that you do every day  · Fill a 3-month supply of your prescription at a time to save you time and trips to the pharmacy - if you would like assistance in switching your prescriptions to a 3-month supply, please contact us.   · Ask your pharmacy if they participate in [facility]", a program where you can  all of your medications on the same day  · Ask your pharmacy if you can be set up with automatic refill, where they will automatically refill your prescription when it is due and let you know it's ready to     Sincerely,     [address] [place] [address] free: [phone]

## 2022-10-14 NOTE — TELEPHONE ENCOUNTER
2nd Attempt Documentation:  2nd attempt to contact this patient regarding the previous message  CLINICAL PHARMACY: ADHERENCE REVIEW  Patient unavailable at the time of call. Left following message on home TAD: please call back at toll-free [phone] to retrieve previous message. Letter mailed to patient.      For Pharmacy Admin Tracking Only    CPA in place:  No  Gap Closed?: No   Time Spent (min): 15

## 2022-10-14 NOTE — TELEPHONE ENCOUNTER
Return call from patient regarding message below. Wife would love to have a 90 day (or 100 day) supply of LISINOPRIL 10MG. Confirmed he takes  once daily. Verified atorvastatin 20 MG, takes nightly in evening. Discussed the importance of taking both daily and strategies on how to remember to take daily. Will consider medication organizer. They would like both refilled and sent to [facility] in [facility]. Outcomes 100 day fill unfinished pending provider response. Will route back. General

## 2022-10-17 DIAGNOSIS — E78.2 MIXED HYPERLIPIDEMIA: ICD-10-CM

## 2022-10-17 DIAGNOSIS — I10 PRIMARY HYPERTENSION: ICD-10-CM

## 2022-10-17 RX ORDER — LISINOPRIL 10 MG/1
10 TABLET ORAL DAILY
Qty: 100 TABLET | Refills: 1 | Status: SHIPPED | OUTPATIENT
Start: 2022-10-17

## 2022-10-17 RX ORDER — ATORVASTATIN CALCIUM 20 MG/1
20 TABLET, FILM COATED ORAL DAILY
Qty: 100 TABLET | Refills: 1 | Status: SHIPPED | OUTPATIENT
Start: 2022-10-17

## 2022-10-17 NOTE — TELEPHONE ENCOUNTER
Becca Terrazas, DO, can we update rxs to 100-day supply for patient convenience and copay savings?  Rxs pended for your signature/modification as appropriate    LOV: 9/7/22  Next: 1/6/23    Thank you,  Olga Wilhelm, PharmD, [address] [place] [address], toll free: [phone], option 1

## 2022-10-18 NOTE — TELEPHONE ENCOUNTER
Noted 100-ds lisinopril and atorvastatin rxs reordered, thank you! [facility] - report both rxs ready for 90ds. Inquired if 100-ds would process - [facility] reprocessed, and 100-ds covered at same copay as 90-ds with patient's [employer]. They will have 100-ds ready to  after 11a today.     Left message advising patient/spouse that rxs have been reordered and [facility] will have 100-ds ready of both to  after 11a today.    =======================================================   For Pharmacy [address] [facility] [address] in place:  No  Recommendation Provided To: Provider: 2 via Note to Provider, Patient/Caregiver: 2 via Telephone, and Pharmacy: 2  Intervention Detail: Adherence Monitorin, CMR/TIP Completed, New Rx: 1, reason: Improve Adherence, and Refill(s) Provided  Gap Closed?: Yes   Intervention Accepted By: Provider: 2, Patient/Caregiver: 2, and Pharmacy: 2  Time Spent (min):  25

## 2022-10-26 ENCOUNTER — OFFICE VISIT (OUTPATIENT)
Dept: ORTHOPEDIC SURGERY | Age: 72
End: 2022-10-26
Payer: MEDICARE

## 2022-10-26 VITALS — BODY MASS INDEX: 25.27 KG/M2 | HEIGHT: 64 IN | WEIGHT: 148 LBS

## 2022-10-26 DIAGNOSIS — Z96.651 HISTORY OF TOTAL RIGHT KNEE REPLACEMENT: Primary | ICD-10-CM

## 2022-10-26 PROCEDURE — 1124F ACP DISCUSS-NO DSCNMKR DOCD: CPT | Performed by: ORTHOPAEDIC SURGERY

## 2022-10-26 PROCEDURE — 99213 OFFICE O/P EST LOW 20 MIN: CPT | Performed by: ORTHOPAEDIC SURGERY

## 2022-10-26 NOTE — PROGRESS NOTES
Follow Up Post Operative Visit     Surgery: Sabas Robot Assisted Right total knee arthroplasty  Date: 10/18/2021    Subjective:    Gilbert Thornton is here for follow up visit s/p above procedure. He is doing well. He has returned to all activities without restrictions tolerating well. Controlled Substances Monitoring:        Physical Exam:    Height: 5' 3.5\" (1.613 m), Weight: 148 lb (67.1 kg) (per pt)    General: Alert and oriented x3, no acute distress  Cardiovascular/pulmonary: No labored breathing, peripheral perfusion intact  Musculoskeletal:    Right knee exam incision site healed mature scar present. No tenderness present with palpation. No swelling deformity visualized. Extensor mechanism intact. Range of motion 0-120. Valgus varus stress intact. Patella stable tracks midline without pain      Imaging: X-ray including 4 views of the right knee shows stable alignment of total knee arthroplasty. There is no evidence of minimally displaced fracture to the inferior pole of the patella that was not present on previous imaging. Assessment and Plan: Status post right Sabas robotic assisted total knee arthroplasty    Today we discussed his right knee. Patient is 1 year out from procedure listed above doing well. On review of new imaging today patient does have new seen fracture to the inferior patella that is minimally displaced. Patient does not recall specific injury and denies pain to the right knee. On exam he has good range of motion stable extensor mechanism without pain. He has been ambulating unassisted without limitations. ROBBIE Moses-CNP  Orthopedic Surgery   10/26/22  8:31 AM    Staff Addendum    I have seen and evaluated the patient and agree with the assessment and plan as documented by Yenny Pratt CNP. I have performed the key components of the history and physical examination and concur with the findings and plan, and have made changes where appropriate/necessary. Amelie Le MD  [facility]

## 2023-01-04 DIAGNOSIS — R25.2 MUSCLE CRAMPS: ICD-10-CM

## 2023-01-04 DIAGNOSIS — R73.01 IMPAIRED FASTING GLUCOSE: ICD-10-CM

## 2023-01-04 DIAGNOSIS — E78.2 MIXED HYPERLIPIDEMIA: ICD-10-CM

## 2023-01-04 DIAGNOSIS — Z12.5 SCREENING FOR PROSTATE CANCER: ICD-10-CM

## 2023-01-04 DIAGNOSIS — N18.31 STAGE 3A CHRONIC KIDNEY DISEASE (HCC): ICD-10-CM

## 2023-01-04 DIAGNOSIS — E55.9 VITAMIN D DEFICIENCY: ICD-10-CM

## 2023-01-04 LAB
ALBUMIN SERPL-MCNC: 4 G/DL (ref 3.5–5.2)
ALP BLD-CCNC: 95 U/L (ref 40–129)
ALT SERPL-CCNC: 12 U/L (ref 0–40)
ANION GAP SERPL CALCULATED.3IONS-SCNC: 16 MMOL/L (ref 7–16)
AST SERPL-CCNC: 18 U/L (ref 0–39)
BASOPHILS ABSOLUTE: 0.05 E9/L (ref 0–0.2)
BASOPHILS RELATIVE PERCENT: 0.6 % (ref 0–2)
BILIRUB SERPL-MCNC: 0.3 MG/DL (ref 0–1.2)
BILIRUBIN URINE: NEGATIVE
BLOOD, URINE: NEGATIVE
BUN BLDV-MCNC: 12 MG/DL (ref 6–23)
CALCIUM SERPL-MCNC: 9.5 MG/DL (ref 8.6–10.2)
CHLORIDE BLD-SCNC: 105 MMOL/L (ref 98–107)
CHOLESTEROL, TOTAL: 201 MG/DL (ref 0–199)
CLARITY: CLEAR
CO2: 21 MMOL/L (ref 22–29)
COLOR: YELLOW
CREAT SERPL-MCNC: 1.3 MG/DL (ref 0.7–1.2)
EOSINOPHILS ABSOLUTE: 0.33 E9/L (ref 0.05–0.5)
EOSINOPHILS RELATIVE PERCENT: 3.8 % (ref 0–6)
GFR SERPL CREATININE-BSD FRML MDRD: 58 ML/MIN/1.73
GLUCOSE BLD-MCNC: 80 MG/DL (ref 74–99)
GLUCOSE URINE: NEGATIVE MG/DL
HBA1C MFR BLD: 5.4 % (ref 4–5.6)
HCT VFR BLD CALC: 41.5 % (ref 37–54)
HDLC SERPL-MCNC: 56 MG/DL
HEMOGLOBIN: 13.2 G/DL (ref 12.5–16.5)
IMMATURE GRANULOCYTES #: 0.02 E9/L
IMMATURE GRANULOCYTES %: 0.2 % (ref 0–5)
KETONES, URINE: NEGATIVE MG/DL
LDL CHOLESTEROL CALCULATED: 125 MG/DL (ref 0–99)
LEUKOCYTE ESTERASE, URINE: NEGATIVE
LYMPHOCYTES ABSOLUTE: 2.42 E9/L (ref 1.5–4)
LYMPHOCYTES RELATIVE PERCENT: 28 % (ref 20–42)
MAGNESIUM: 1.9 MG/DL (ref 1.6–2.6)
MCH RBC QN AUTO: 29.9 PG (ref 26–35)
MCHC RBC AUTO-ENTMCNC: 31.8 % (ref 32–34.5)
MCV RBC AUTO: 93.9 FL (ref 80–99.9)
MONOCYTES ABSOLUTE: 0.82 E9/L (ref 0.1–0.95)
MONOCYTES RELATIVE PERCENT: 9.5 % (ref 2–12)
NEUTROPHILS ABSOLUTE: 5.01 E9/L (ref 1.8–7.3)
NEUTROPHILS RELATIVE PERCENT: 57.9 % (ref 43–80)
NITRITE, URINE: NEGATIVE
PDW BLD-RTO: 13.1 FL (ref 11.5–15)
PH UA: 6 (ref 5–9)
PLATELET # BLD: 346 E9/L (ref 130–450)
PMV BLD AUTO: 10.7 FL (ref 7–12)
POTASSIUM SERPL-SCNC: 4.1 MMOL/L (ref 3.5–5)
PROSTATE SPECIFIC ANTIGEN: 2.11 NG/ML (ref 0–4)
PROTEIN UA: NEGATIVE MG/DL
RBC # BLD: 4.42 E12/L (ref 3.8–5.8)
SODIUM BLD-SCNC: 142 MMOL/L (ref 132–146)
SPECIFIC GRAVITY UA: 1.02 (ref 1–1.03)
TOTAL PROTEIN: 7.3 G/DL (ref 6.4–8.3)
TRIGL SERPL-MCNC: 101 MG/DL (ref 0–149)
TSH SERPL DL<=0.05 MIU/L-ACNC: 5.42 UIU/ML (ref 0.27–4.2)
UROBILINOGEN, URINE: 0.2 E.U./DL
VITAMIN D 25-HYDROXY: 19 NG/ML (ref 30–100)
VLDLC SERPL CALC-MCNC: 20 MG/DL
WBC # BLD: 8.7 E9/L (ref 4.5–11.5)

## 2023-03-10 ENCOUNTER — OFFICE VISIT (OUTPATIENT)
Dept: FAMILY MEDICINE CLINIC | Age: 73
End: 2023-03-10

## 2023-03-10 VITALS
SYSTOLIC BLOOD PRESSURE: 122 MMHG | DIASTOLIC BLOOD PRESSURE: 74 MMHG | RESPIRATION RATE: 16 BRPM | TEMPERATURE: 98.7 F | WEIGHT: 161 LBS | HEART RATE: 68 BPM | OXYGEN SATURATION: 97 % | HEIGHT: 64 IN | BODY MASS INDEX: 27.49 KG/M2

## 2023-03-10 DIAGNOSIS — E55.9 VITAMIN D DEFICIENCY: ICD-10-CM

## 2023-03-10 DIAGNOSIS — E78.2 MIXED HYPERLIPIDEMIA: ICD-10-CM

## 2023-03-10 DIAGNOSIS — N52.9 VASCULOGENIC ERECTILE DYSFUNCTION, UNSPECIFIED VASCULOGENIC ERECTILE DYSFUNCTION TYPE: ICD-10-CM

## 2023-03-10 DIAGNOSIS — K21.9 GASTROESOPHAGEAL REFLUX DISEASE WITHOUT ESOPHAGITIS: ICD-10-CM

## 2023-03-10 DIAGNOSIS — I10 PRIMARY HYPERTENSION: ICD-10-CM

## 2023-03-10 DIAGNOSIS — R73.01 IMPAIRED FASTING GLUCOSE: ICD-10-CM

## 2023-03-10 DIAGNOSIS — Z00.00 MEDICARE ANNUAL WELLNESS VISIT, SUBSEQUENT: Primary | ICD-10-CM

## 2023-03-10 DIAGNOSIS — N18.31 STAGE 3A CHRONIC KIDNEY DISEASE (HCC): ICD-10-CM

## 2023-03-10 PROBLEM — G89.29 CHRONIC PAIN OF RIGHT KNEE: Status: RESOLVED | Noted: 2017-02-07 | Resolved: 2023-03-10

## 2023-03-10 PROBLEM — M25.561 CHRONIC PAIN OF RIGHT KNEE: Status: RESOLVED | Noted: 2017-02-07 | Resolved: 2023-03-10

## 2023-03-10 RX ORDER — SILDENAFIL CITRATE 20 MG/1
20 TABLET ORAL DAILY PRN
Qty: 30 TABLET | Refills: 5 | Status: SHIPPED | OUTPATIENT
Start: 2023-03-10

## 2023-03-10 RX ORDER — OMEPRAZOLE 40 MG/1
40 CAPSULE, DELAYED RELEASE ORAL DAILY
Qty: 90 CAPSULE | Refills: 1 | Status: SHIPPED | OUTPATIENT
Start: 2023-03-10 | End: 2023-06-08

## 2023-03-10 RX ORDER — LISINOPRIL 10 MG/1
10 TABLET ORAL DAILY
Qty: 100 TABLET | Refills: 1 | Status: SHIPPED | OUTPATIENT
Start: 2023-03-10

## 2023-03-10 RX ORDER — ERGOCALCIFEROL 1.25 MG/1
50000 CAPSULE ORAL WEEKLY
Qty: 12 CAPSULE | Refills: 1 | Status: CANCELLED | OUTPATIENT
Start: 2023-03-10

## 2023-03-10 RX ORDER — ATORVASTATIN CALCIUM 20 MG/1
20 TABLET, FILM COATED ORAL DAILY
Qty: 100 TABLET | Refills: 1 | Status: SHIPPED | OUTPATIENT
Start: 2023-03-10

## 2023-03-10 ASSESSMENT — LIFESTYLE VARIABLES
HOW OFTEN DO YOU HAVE A DRINK CONTAINING ALCOHOL: MONTHLY OR LESS
HOW MANY STANDARD DRINKS CONTAINING ALCOHOL DO YOU HAVE ON A TYPICAL DAY: 1 OR 2

## 2023-03-10 ASSESSMENT — PATIENT HEALTH QUESTIONNAIRE - PHQ9
SUM OF ALL RESPONSES TO PHQ QUESTIONS 1-9: 6
10. IF YOU CHECKED OFF ANY PROBLEMS, HOW DIFFICULT HAVE THESE PROBLEMS MADE IT FOR YOU TO DO YOUR WORK, TAKE CARE OF THINGS AT HOME, OR GET ALONG WITH OTHER PEOPLE: 0
6. FEELING BAD ABOUT YOURSELF - OR THAT YOU ARE A FAILURE OR HAVE LET YOURSELF OR YOUR FAMILY DOWN: 0
8. MOVING OR SPEAKING SO SLOWLY THAT OTHER PEOPLE COULD HAVE NOTICED. OR THE OPPOSITE, BEING SO FIGETY OR RESTLESS THAT YOU HAVE BEEN MOVING AROUND A LOT MORE THAN USUAL: 0
4. FEELING TIRED OR HAVING LITTLE ENERGY: 1
3. TROUBLE FALLING OR STAYING ASLEEP: 1
9. THOUGHTS THAT YOU WOULD BE BETTER OFF DEAD, OR OF HURTING YOURSELF: 0
7. TROUBLE CONCENTRATING ON THINGS, SUCH AS READING THE NEWSPAPER OR WATCHING TELEVISION: 1
SUM OF ALL RESPONSES TO PHQ9 QUESTIONS 1 & 2: 3
2. FEELING DOWN, DEPRESSED OR HOPELESS: 1
5. POOR APPETITE OR OVEREATING: 0
SUM OF ALL RESPONSES TO PHQ QUESTIONS 1-9: 6
1. LITTLE INTEREST OR PLEASURE IN DOING THINGS: 2

## 2023-03-10 NOTE — PROGRESS NOTES
Medicare Annual Wellness Visit    Gerald Velásquez is here for Medicare AWV    Assessment & Plan   Medicare annual wellness visit, subsequent  HRA reviewed and addressed. UTD on HM. Labs reviewed in detail. Primary hypertension  -     lisinopril (PRINIVIL;ZESTRIL) 10 MG tablet; Take 1 tablet by mouth daily, Disp-100 tablet, R-1Normal  -     CBC with Auto Differential; Future  -     Comprehensive Metabolic Panel; Future  -     Lipid Panel; Future  -     TSH; Future  -     Urinalysis; Future  -     Vitamin B12 & Folate; Future  -     Magnesium; Future  -     T4, Free; Future  Well controlled. Due for repeat labs in 3 months. Stage 3a chronic kidney disease (HCC)  -     CBC with Auto Differential; Future  -     Comprehensive Metabolic Panel; Future  -     Lipid Panel; Future  -     TSH; Future  -     Vitamin D 25 Hydroxy; Future  -     Urinalysis; Future  -     Vitamin B12 & Folate; Future  -     Magnesium; Future  -     T4, Free; Future    Mixed hyperlipidemia  -     atorvastatin (LIPITOR) 20 MG tablet; Take 1 tablet by mouth daily, Disp-100 tablet, R-1Normal  -     Lipid Panel; Future    Gastroesophageal reflux disease without esophagitis  -     omeprazole (PRILOSEC) 40 MG delayed release capsule; Take 1 capsule by mouth Daily, Disp-90 capsule, R-1Normal    Vasculogenic erectile dysfunction, unspecified vasculogenic erectile dysfunction type  -     sildenafil (REVATIO) 20 MG tablet; Take 1 tablet by mouth daily as needed (ED), Disp-30 tablet, R-5Normal    Vitamin D deficiency  -     Vitamin D 25 Hydroxy; Future  Not having benefit with D2. Change to D3 5000U daily.      Impaired fasting glucose  -     Hemoglobin A1C; Future      Recommendations for Preventive Services Due: see orders and patient instructions/AVS.  Recommended screening schedule for the next 5-10 years is provided to the patient in written form: see Patient Instructions/AVS.     Return in about 3 months (around 6/10/2023), or if symptoms worsen or fail to improve, for Chronic medical conditions. Subjective   The following acute and/or chronic problems were also addressed today:    Issues with fatigue. L TMJ pain. Patient's complete Health Risk Assessment and screening values have been reviewed and are found in Flowsheets. The following problems were reviewed today and where indicated follow up appointments were made and/or referrals ordered. Positive Risk Factor Screenings with Interventions:    Fall Risk:  Do you feel unsteady or are you worried about falling? : (!) yes  2 or more falls in past year?: (!) yes  Fall with injury in past year?: (!) yes     Interventions:    Patient declines any further evaluation or treatment     Depression:  PHQ-2 Score: 3  PHQ-9 Total Score: 6    Interpretation:   1-4 = minimal  5-9 = mild  10-14 = moderate  15-19 = moderately severe  20-27 = severe  Interventions:  Patient declines any further evaluation or treatment          General HRA Questions:  Select all that apply: (!) New or Increased Fatigue    Fatigue Interventions:  Vitamin D very low and TSH elevated on recent labs. Will recheck. Dentist Screen:  Have you seen the dentist within the past year?: (!) No    Intervention:  Advised to schedule with their dentist    Hearing Screen:  Do you or your family notice any trouble with your hearing that hasn't been managed with hearing aids?: (!) Yes    Interventions:  Patient declines any further evaluation or treatment    Vision Screen:  Do you have difficulty driving, watching TV, or doing any of your daily activities because of your eyesight?: No  Have you had an eye exam within the past year?: (!) No  No results found.     Interventions:   Patient encouraged to make appointment with their eye specialist    Safety:  Do you have any tripping hazards - loose or unsecured carpets or rugs?: (!) Yes  Do you have any tripping hazards - clutter in doorways, halls, or stairs?: (!) Yes  Do you always fasten your seatbelt when you are in a car?: (!) No  Interventions:  Patient declined any further interventions or treatment     Advanced Directives:  Do you have a Living Will?: (!) No    Intervention:  has NO advanced directive - information provided              Objective   Vitals:    03/10/23 1607   BP: 122/74   Pulse: 68   Resp: 16   Temp: 98.7 °F (37.1 °C)   TempSrc: Temporal   SpO2: 97%   Weight: 161 lb (73 kg)   Height: 5' 3.5\" (1.613 m)      Body mass index is 28.07 kg/m². Physical Exam  Vitals and nursing note reviewed. Constitutional:       General: He is not in acute distress. Appearance: Normal appearance. He is well-developed and normal weight. He is not ill-appearing. HENT:      Head: Normocephalic and atraumatic. Right Ear: Hearing, tympanic membrane, ear canal and external ear normal. There is no impacted cerumen. Left Ear: Hearing, tympanic membrane, ear canal and external ear normal. There is no impacted cerumen. Nose: Nose normal. No congestion or rhinorrhea. Mouth/Throat:      Mouth: Mucous membranes are moist.      Pharynx: Oropharynx is clear. No oropharyngeal exudate or posterior oropharyngeal erythema. Eyes:      General: Lids are normal. No scleral icterus. Extraocular Movements: Extraocular movements intact. Conjunctiva/sclera: Conjunctivae normal.   Neck:      Thyroid: No thyromegaly. Vascular: No carotid bruit. Cardiovascular:      Rate and Rhythm: Normal rate and regular rhythm. Heart sounds: Normal heart sounds. No murmur heard. Pulmonary:      Effort: Pulmonary effort is normal. No respiratory distress. Breath sounds: Normal breath sounds. No wheezing. Musculoskeletal:         General: No tenderness or deformity. Normal range of motion. Cervical back: Normal range of motion and neck supple. Right lower leg: No edema. Left lower leg: No edema. Lymphadenopathy:      Cervical: No cervical adenopathy.   Skin:     General: Skin is warm and dry.      Findings: No rash.   Neurological:      General: No focal deficit present.      Mental Status: He is alert and oriented to person, place, and time.      Gait: Gait normal.   Psychiatric:         Mood and Affect: Mood and affect normal.         Speech: Speech normal.         Behavior: Behavior normal.         Thought Content: Thought content normal.            Allergies   Allergen Reactions    Bee Venom Swelling    Poison Lesley Extract Rash     Prior to Visit Medications    Medication Sig Taking? Authorizing Provider   omeprazole (PRILOSEC) 40 MG delayed release capsule Take 1 capsule by mouth Daily Yes Brianna Obando DO   sildenafil (REVATIO) 20 MG tablet Take 1 tablet by mouth daily as needed (ED) Yes Brianna Obando DO   lisinopril (PRINIVIL;ZESTRIL) 10 MG tablet Take 1 tablet by mouth daily Yes Brianna Obando DO   atorvastatin (LIPITOR) 20 MG tablet Take 1 tablet by mouth daily Yes Brianna Obando DO   ibuprofen (ADVIL;MOTRIN) 200 MG tablet Take 200 mg by mouth every 6 hours as needed for Pain Yes Historical Provider, MD   senna (SENOKOT) 8.6 MG tablet Take 1 tablet by mouth  Yes Historical Provider, MD Argueta (Including outside providers/suppliers regularly involved in providing care):   Patient Care Team:  Brianna Obando DO as PCP - General (Family Medicine)  Brianna Obando DO as PCP - Empaneled Provider  Yosef Vines MD as Consulting Physician (Cardiology)  Bandar Rowley MD as Consulting Physician (Cardiothoracic Surgery)  Sven Lopez MD as Consulting Physician (Orthopedic Surgery)  Chucho Guzmán DO as Consulting Physician (Dermatology)  Emmett Thurman Jr., DPM as Consulting Physician (Podiatry)     Reviewed and updated this visit:  Tobacco  Allergies  Meds  Problems  Med Hx  Surg Hx  Soc Hx  Fam Hx               Brianna Obando DO

## 2023-03-10 NOTE — PATIENT INSTRUCTIONS
Advance Directives: Care Instructions  Overview  An advance directive is a legal way to state your wishes at the end of your life. It tells your family and your doctor what to do if you can't say what you want. There are two main types of advance directives. You can change them any time your wishes change. Living will. This form tells your family and your doctor your wishes about life support and other treatment. The form is also called a declaration. Medical power of . This form lets you name a person to make treatment decisions for you when you can't speak for yourself. This person is called a health care agent (health care proxy, health care surrogate). The form is also called a durable power of  for health care. If you do not have an advance directive, decisions about your medical care may be made by a family member, or by a doctor or a  who doesn't know you. It may help to think of an advance directive as a gift to the people who care for you. If you have one, they won't have to make tough decisions by themselves. For more information, including forms for your state, see the [address] website (www.caringinfo.org/planning/advance-directives/). Follow-up care is a key part of your treatment and safety. Be sure to make and go to all appointments, and call your doctor if you are having problems. It's also a good idea to know your test results and keep a list of the medicines you take. What should you include in an advance directive? Many states have a unique advance directive form. (It may ask you to address specific issues.) Or you might use a universal form that's approved by many states. If your form doesn't tell you what to address, it may be hard to know what to include in your advance directive. Use the questions below to help you get started. Who do you want to make decisions about your medical care if you are not able to?   What life-support measures do you want if you have a serious illness that gets worse over time or can't be cured? What are you most afraid of that might happen? (Maybe you're afraid of having pain, losing your independence, or being kept alive by machines.)  Where would you prefer to die? (Your home? A hospital? A nursing home?)  Do you want to donate your organs when you die? Do you want certain U[place] practices performed before you die? When should you call for help? Be sure to contact your doctor if you have any questions. Where can you learn more? Go to http://www.fowler.com/ and enter R264 to learn more about \"Advance Directives: Care Instructions. \"  Current as of: June 16, 2022               Content Version: 13.5  © 2006-2022 [employer]. Care instructions adapted under license by [facility] ([facility]). If you have questions about a medical condition or this instruction, always ask your healthcare professional. [place] [address] any warranty or liability for your use of this information. A Healthy Heart: Care Instructions  Your Care Instructions     Coronary artery disease, also called heart disease, occurs when a substance called plaque builds up in the vessels that supply oxygen-rich blood to your heart muscle. This can narrow the blood vessels and reduce blood flow. A heart attack happens when blood flow is completely blocked. A high-fat diet, smoking, and other factors increase the risk of heart disease. Your doctor has found that you have a chance of having heart disease. You can do lots of things to keep your heart healthy. It may not be easy, but you can change your diet, exercise more, and quit smoking. These steps really work to lower your chance of heart disease. Follow-up care is a key part of your treatment and safety. Be sure to make and go to all appointments, and call your doctor if you are having problems.  It's also a good idea to know your test results and keep a list of the medicines you take. How can you care for yourself at home? Diet    Use less salt when you cook and eat. This helps lower your blood pressure. Taste food before salting. Add only a little salt when you think you need it. With time, your taste buds will adjust to less salt.     Eat fewer snack items, fast foods, canned soups, and other high-salt, high-fat, processed foods.     Read food labels and try to avoid saturated and trans fats. They increase your risk of heart disease by raising cholesterol levels.     Limit the amount of solid fat-butter, margarine, and shortening-you eat. Use olive, peanut, or canola oil when you cook. Bake, broil, and steam foods instead of frying them.     Eat a variety of fruit and vegetables every day. Dark green, deep orange, red, or yellow fruits and vegetables are especially good for you. Examples include spinach, carrots, peaches, and berries.     Foods high in fiber can reduce your cholesterol and provide important vitamins and minerals. High-fiber foods include whole-grain cereals and breads, oatmeal, beans, brown rice, citrus fruits, and apples.     Eat lean proteins. Heart-healthy proteins include seafood, lean meats and poultry, eggs, beans, peas, nuts, seeds, and soy products.     Limit drinks and foods with added sugar. These include candy, desserts, and soda pop. Lifestyle changes    If your doctor recommends it, get more exercise. Walking is a good choice. Bit by bit, increase the amount you walk every day. Try for at least 30 minutes on most days of the week. You also may want to swim, bike, or do other activities.     Do not smoke. If you need help quitting, talk to your doctor about stop-smoking programs and medicines. These can increase your chances of quitting for good. Quitting smoking may be the most important step you can take to protect your heart. It is never too late to quit.     Limit alcohol to 2 drinks a day for men and 1 drink a day for women.  Too much alcohol can cause health problems.     Manage other health problems such as diabetes, high blood pressure, and high cholesterol. If you think you may have a problem with alcohol or drug use, talk to your doctor. Medicines    Take your medicines exactly as prescribed. Call your doctor if you think you are having a problem with your medicine.     If your doctor recommends aspirin, take the amount directed each day. Make sure you take aspirin and not another kind of pain reliever, such as acetaminophen (Tylenol). When should you call for help? Call 911 if you have symptoms of a heart attack. These may include:    Chest pain or pressure, or a strange feeling in the chest.     Sweating.     Shortness of breath.     Pain, pressure, or a strange feeling in the back, neck, jaw, or upper belly or in one or both shoulders or arms.     Lightheadedness or sudden weakness.     A fast or irregular heartbeat. After you call 911, the  may tell you to chew 1 adult-strength or 2 to 4 low-dose aspirin. Wait for an ambulance. Do not try to drive yourself. Watch closely for changes in your health, and be sure to contact your doctor if you have any problems. Where can you learn more? Go to http://www.fowler.com/ and enter F075 to learn more about \"A Healthy Heart: Care Instructions. \"  Current as of: September 7, 2022               Content Version: 13.5  © 8489-9031 Healthwise[employer] Incorporated. Care instructions adapted under license by [facility] ([facility]). If you have questions about a medical condition or this instruction, always ask your healthcare professional. [place] [address] any warranty or liability for your use of this information. Personalized Preventive Plan for Yamilet Liang - 3/10/2023  Medicare offers a range of preventive health benefits. Some of the tests and screenings are paid in full while other may be subject to a deductible, co-insurance, and/or copay.     Some of these benefits include a comprehensive review of your medical history including lifestyle, illnesses that may run in your family, and various assessments and screenings as appropriate. After reviewing your medical record and screening and assessments performed today your provider may have ordered immunizations, labs, imaging, and/or referrals for you. A list of these orders (if applicable) as well as your Preventive Care list are included within your After Visit Summary for your review. Other Preventive Recommendations:    A preventive eye exam performed by an eye specialist is recommended every 1-2 years to screen for glaucoma; cataracts, macular degeneration, and other eye disorders. A preventive dental visit is recommended every 6 months. Try to get at least 150 minutes of exercise per week or 10,000 steps per day on a pedometer . Order or download the FREE \"Exercise & Physical Activity: Your Everyday Guide\" from [employer]. Call 0-438.742.7030 or search The [employer] online. You need 4856-7591 mg of calcium and 1648-5659 IU of vitamin D per day. It is possible to meet your calcium requirement with diet alone, but a vitamin D supplement is usually necessary to meet this goal.  When exposed to the sun, use a sunscreen that protects against both UVA and UVB radiation with an SPF of 30 or greater. Reapply every 2 to 3 hours or after sweating, drying off with a towel, or swimming. Always wear a seat belt when traveling in a car. Always wear a helmet when riding a bicycle or motorcycle.

## 2023-06-13 DIAGNOSIS — I10 PRIMARY HYPERTENSION: ICD-10-CM

## 2023-06-13 DIAGNOSIS — N18.31 STAGE 3A CHRONIC KIDNEY DISEASE (HCC): ICD-10-CM

## 2023-06-13 DIAGNOSIS — E55.9 VITAMIN D DEFICIENCY: ICD-10-CM

## 2023-06-13 DIAGNOSIS — R73.01 IMPAIRED FASTING GLUCOSE: ICD-10-CM

## 2023-06-13 DIAGNOSIS — E78.2 MIXED HYPERLIPIDEMIA: ICD-10-CM

## 2023-06-13 LAB
ALBUMIN SERPL-MCNC: 4 G/DL (ref 3.5–5.2)
ALP SERPL-CCNC: 82 U/L (ref 40–129)
ALT SERPL-CCNC: 16 U/L (ref 0–40)
ANION GAP SERPL CALCULATED.3IONS-SCNC: 14 MMOL/L (ref 7–16)
AST SERPL-CCNC: 18 U/L (ref 0–39)
BASOPHILS # BLD: 0.05 E9/L (ref 0–0.2)
BASOPHILS NFR BLD: 0.9 % (ref 0–2)
BILIRUB SERPL-MCNC: 0.3 MG/DL (ref 0–1.2)
BILIRUB UR QL STRIP: NEGATIVE
BUN SERPL-MCNC: 20 MG/DL (ref 6–23)
CALCIUM SERPL-MCNC: 9.5 MG/DL (ref 8.6–10.2)
CHLORIDE SERPL-SCNC: 104 MMOL/L (ref 98–107)
CHOLESTEROL, TOTAL: 219 MG/DL (ref 0–199)
CLARITY UR: CLEAR
CO2 SERPL-SCNC: 22 MMOL/L (ref 22–29)
COLOR UR: YELLOW
CREAT SERPL-MCNC: 1.3 MG/DL (ref 0.7–1.2)
EOSINOPHIL # BLD: 0.28 E9/L (ref 0.05–0.5)
EOSINOPHIL NFR BLD: 5.1 % (ref 0–6)
ERYTHROCYTE [DISTWIDTH] IN BLOOD BY AUTOMATED COUNT: 14.6 FL (ref 11.5–15)
FOLATE SERPL-MCNC: 18.3 NG/ML (ref 4.8–24.2)
GLUCOSE SERPL-MCNC: 88 MG/DL (ref 74–99)
GLUCOSE UR STRIP-MCNC: NEGATIVE MG/DL
HBA1C MFR BLD: 5.2 % (ref 4–5.6)
HCT VFR BLD AUTO: 46.2 % (ref 37–54)
HDLC SERPL-MCNC: 57 MG/DL
HGB BLD-MCNC: 14.3 G/DL (ref 12.5–16.5)
HGB UR QL STRIP: NEGATIVE
IMM GRANULOCYTES # BLD: 0.01 E9/L
IMM GRANULOCYTES NFR BLD: 0.2 % (ref 0–5)
KETONES UR STRIP-MCNC: NEGATIVE MG/DL
LDLC SERPL CALC-MCNC: 135 MG/DL (ref 0–99)
LEUKOCYTE ESTERASE UR QL STRIP: NEGATIVE
LYMPHOCYTES # BLD: 1.63 E9/L (ref 1.5–4)
LYMPHOCYTES NFR BLD: 29.6 % (ref 20–42)
MAGNESIUM SERPL-MCNC: 2 MG/DL (ref 1.6–2.6)
MCH RBC QN AUTO: 29.6 PG (ref 26–35)
MCHC RBC AUTO-ENTMCNC: 31 % (ref 32–34.5)
MCV RBC AUTO: 95.7 FL (ref 80–99.9)
MONOCYTES # BLD: 0.53 E9/L (ref 0.1–0.95)
MONOCYTES NFR BLD: 9.6 % (ref 2–12)
NEUTROPHILS # BLD: 3 E9/L (ref 1.8–7.3)
NEUTS SEG NFR BLD: 54.6 % (ref 43–80)
NITRITE UR QL STRIP: NEGATIVE
PH UR STRIP: 7 [PH] (ref 5–9)
PLATELET # BLD AUTO: 355 E9/L (ref 130–450)
PMV BLD AUTO: 11.4 FL (ref 7–12)
POTASSIUM SERPL-SCNC: 4.4 MMOL/L (ref 3.5–5)
PROT SERPL-MCNC: 7.4 G/DL (ref 6.4–8.3)
PROT UR STRIP-MCNC: NEGATIVE MG/DL
RBC # BLD AUTO: 4.83 E12/L (ref 3.8–5.8)
SODIUM SERPL-SCNC: 140 MMOL/L (ref 132–146)
SP GR UR STRIP: 1.01 (ref 1–1.03)
T4 FREE SERPL-MCNC: 1.01 NG/DL (ref 0.93–1.7)
TRIGL SERPL-MCNC: 135 MG/DL (ref 0–149)
TSH SERPL-MCNC: 5.99 UIU/ML (ref 0.27–4.2)
UROBILINOGEN UR STRIP-ACNC: 0.2 E.U./DL
VIT B12 SERPL-MCNC: 373 PG/ML (ref 211–946)
VITAMIN D 25-HYDROXY: 38 NG/ML (ref 30–100)
VLDLC SERPL CALC-MCNC: 27 MG/DL
WBC # BLD: 5.5 E9/L (ref 4.5–11.5)

## 2023-06-23 ENCOUNTER — OFFICE VISIT (OUTPATIENT)
Dept: FAMILY MEDICINE CLINIC | Age: 73
End: 2023-06-23

## 2023-06-23 VITALS
RESPIRATION RATE: 18 BRPM | BODY MASS INDEX: 26.46 KG/M2 | WEIGHT: 155 LBS | HEART RATE: 78 BPM | HEIGHT: 64 IN | DIASTOLIC BLOOD PRESSURE: 80 MMHG | SYSTOLIC BLOOD PRESSURE: 124 MMHG | TEMPERATURE: 98 F | OXYGEN SATURATION: 97 %

## 2023-06-23 DIAGNOSIS — Z96.651 STATUS POST TOTAL KNEE REPLACEMENT, RIGHT: ICD-10-CM

## 2023-06-23 DIAGNOSIS — K21.9 GASTROESOPHAGEAL REFLUX DISEASE WITHOUT ESOPHAGITIS: ICD-10-CM

## 2023-06-23 DIAGNOSIS — I10 PRIMARY HYPERTENSION: Primary | ICD-10-CM

## 2023-06-23 DIAGNOSIS — E03.9 ACQUIRED HYPOTHYROIDISM: ICD-10-CM

## 2023-06-23 DIAGNOSIS — E78.2 MIXED HYPERLIPIDEMIA: ICD-10-CM

## 2023-06-23 RX ORDER — ATORVASTATIN CALCIUM 20 MG/1
20 TABLET, FILM COATED ORAL DAILY
Qty: 100 TABLET | Refills: 1 | Status: CANCELLED | OUTPATIENT
Start: 2023-06-23

## 2023-06-23 RX ORDER — NAPROXEN 500 MG/1
500 TABLET ORAL 2 TIMES DAILY WITH MEALS
Qty: 60 TABLET | Refills: 5 | Status: SHIPPED | OUTPATIENT
Start: 2023-06-23

## 2023-06-23 RX ORDER — LISINOPRIL 10 MG/1
10 TABLET ORAL DAILY
Qty: 100 TABLET | Refills: 1 | Status: SHIPPED | OUTPATIENT
Start: 2023-06-23

## 2023-06-23 RX ORDER — LEVOTHYROXINE SODIUM 0.05 MG/1
50 TABLET ORAL DAILY
Qty: 90 TABLET | Refills: 1 | Status: SHIPPED | OUTPATIENT
Start: 2023-06-23

## 2023-06-23 RX ORDER — OMEPRAZOLE 40 MG/1
40 CAPSULE, DELAYED RELEASE ORAL DAILY
Qty: 90 CAPSULE | Refills: 1 | Status: SHIPPED | OUTPATIENT
Start: 2023-06-23 | End: 2023-09-21

## 2023-06-23 ASSESSMENT — ENCOUNTER SYMPTOMS
DIARRHEA: 0
BACK PAIN: 0
WHEEZING: 0
CONSTIPATION: 0
ABDOMINAL PAIN: 0
NAUSEA: 0
VOMITING: 0
SHORTNESS OF BREATH: 0
COUGH: 0

## 2023-06-23 NOTE — PROGRESS NOTES
this.     In April 2022, he noted difficulty with MOSHER while out in the yard cutting firewood or doing an activity around the house. Feels his heart \"pounding\" at night. Has had episodes of \"cold sweats\" but no fever or chills. No true chest pain. Intermittent leg cramping and decreased appetite. He has been seen by Dr. Porfirio Ferrera in the last 6 months without any changes. ROS:  Review of Systems   Constitutional:  Negative for chills, fatigue, fever and unexpected weight change. Respiratory:  Negative for cough, shortness of breath and wheezing. Cardiovascular:  Negative for chest pain and palpitations. Gastrointestinal:  Negative for abdominal pain, constipation, diarrhea, nausea and vomiting. Musculoskeletal:  Positive for arthralgias, gait problem and myalgias (leg cramps). Negative for back pain. Skin:  Negative for rash. Neurological:  Negative for dizziness and headaches. Psychiatric/Behavioral:  Positive for sleep disturbance. Negative for dysphoric mood. The patient is not nervous/anxious. All other systems reviewed and are negative. Current Outpatient Medications on File Prior to Visit   Medication Sig Dispense Refill    sildenafil (REVATIO) 20 MG tablet Take 1 tablet by mouth daily as needed (ED) 30 tablet 5    ibuprofen (ADVIL;MOTRIN) 200 MG tablet Take 1 tablet by mouth every 6 hours as needed for Pain      senna (SENOKOT) 8.6 MG tablet Take 1 tablet by mouth       No current facility-administered medications on file prior to visit.        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

## 2023-07-31 ENCOUNTER — TELEPHONE (OUTPATIENT)
Dept: PHARMACY | Facility: CLINIC | Age: 73
End: 2023-07-31

## 2023-07-31 NOTE — TELEPHONE ENCOUNTER
are interventions that have been identified:   Per chart review OV on 6/23/23:  Mixed hyperlipidemia   Lipid Panel; Future  Patient with significant cramping. Will HOLD Atorvastatin to see if culprit. If so, will start Crestor 5 mg instead    Per database and outcomes, patient adherent with filling lisinopril 10mg    No patient outreach at this time. Last Visit: 6/23/23  Next Visit: 9/15/23    For Pharmacy Admin Tracking Only    Program: Vladislav in place:  No  Gap Closed?: Yes   Time Spent (min): [address] [facility] [place].   [facility] free: [phone]

## 2023-09-07 DIAGNOSIS — E78.2 MIXED HYPERLIPIDEMIA: ICD-10-CM

## 2023-09-07 DIAGNOSIS — E03.9 ACQUIRED HYPOTHYROIDISM: ICD-10-CM

## 2023-09-07 LAB
CHOLESTEROL: 261 MG/DL
HDLC SERPL-MCNC: 54 MG/DL
LDL CHOLESTEROL: 184 MG/DL
T4 FREE: 1.2 NG/DL (ref 0.9–1.7)
TRIGL SERPL-MCNC: 116 MG/DL
TSH SERPL DL<=0.05 MIU/L-ACNC: 1.7 UIU/ML (ref 0.27–4.2)
VLDLC SERPL CALC-MCNC: 23 MG/DL

## 2023-09-15 ENCOUNTER — OFFICE VISIT (OUTPATIENT)
Dept: FAMILY MEDICINE CLINIC | Age: 73
End: 2023-09-15

## 2023-09-15 VITALS
RESPIRATION RATE: 18 BRPM | TEMPERATURE: 98 F | HEIGHT: 64 IN | DIASTOLIC BLOOD PRESSURE: 78 MMHG | OXYGEN SATURATION: 99 % | WEIGHT: 153 LBS | HEART RATE: 63 BPM | BODY MASS INDEX: 26.12 KG/M2 | SYSTOLIC BLOOD PRESSURE: 120 MMHG

## 2023-09-15 DIAGNOSIS — R73.01 IMPAIRED FASTING GLUCOSE: ICD-10-CM

## 2023-09-15 DIAGNOSIS — E03.9 ACQUIRED HYPOTHYROIDISM: ICD-10-CM

## 2023-09-15 DIAGNOSIS — F34.1 DYSTHYMIA: ICD-10-CM

## 2023-09-15 DIAGNOSIS — I10 PRIMARY HYPERTENSION: Primary | ICD-10-CM

## 2023-09-15 DIAGNOSIS — Z23 NEED FOR INFLUENZA VACCINATION: ICD-10-CM

## 2023-09-15 DIAGNOSIS — E78.2 MIXED HYPERLIPIDEMIA: ICD-10-CM

## 2023-09-15 DIAGNOSIS — E55.9 VITAMIN D DEFICIENCY: ICD-10-CM

## 2023-09-15 RX ORDER — LISINOPRIL 10 MG/1
10 TABLET ORAL DAILY
Qty: 100 TABLET | Refills: 1 | Status: CANCELLED | OUTPATIENT
Start: 2023-09-15

## 2023-09-15 RX ORDER — OMEPRAZOLE 40 MG/1
40 CAPSULE, DELAYED RELEASE ORAL DAILY
Qty: 90 CAPSULE | Refills: 1 | Status: CANCELLED | OUTPATIENT
Start: 2023-09-15 | End: 2023-12-14

## 2023-09-15 RX ORDER — ATORVASTATIN CALCIUM 20 MG/1
20 TABLET, FILM COATED ORAL DAILY
Qty: 90 TABLET | Refills: 0 | Status: SHIPPED | OUTPATIENT
Start: 2023-09-15

## 2023-09-15 RX ORDER — LEVOTHYROXINE SODIUM 0.05 MG/1
50 TABLET ORAL DAILY
Qty: 90 TABLET | Refills: 1 | Status: CANCELLED | OUTPATIENT
Start: 2023-09-15

## 2023-09-15 RX ORDER — NAPROXEN 500 MG/1
500 TABLET ORAL 2 TIMES DAILY WITH MEALS
Qty: 60 TABLET | Refills: 5 | Status: CANCELLED | OUTPATIENT
Start: 2023-09-15

## 2023-09-15 RX ORDER — BUPROPION HYDROCHLORIDE 150 MG/1
150 TABLET ORAL EVERY MORNING
Qty: 90 TABLET | Refills: 0 | Status: SHIPPED | OUTPATIENT
Start: 2023-09-15

## 2023-09-15 SDOH — ECONOMIC STABILITY: FOOD INSECURITY: WITHIN THE PAST 12 MONTHS, YOU WORRIED THAT YOUR FOOD WOULD RUN OUT BEFORE YOU GOT MONEY TO BUY MORE.: NEVER TRUE

## 2023-09-15 SDOH — ECONOMIC STABILITY: HOUSING INSECURITY
IN THE LAST 12 MONTHS, WAS THERE A TIME WHEN YOU DID NOT HAVE A STEADY PLACE TO SLEEP OR SLEPT IN A SHELTER (INCLUDING NOW)?: NO

## 2023-09-15 SDOH — ECONOMIC STABILITY: INCOME INSECURITY: HOW HARD IS IT FOR YOU TO PAY FOR THE VERY BASICS LIKE FOOD, HOUSING, MEDICAL CARE, AND HEATING?: NOT HARD AT ALL

## 2023-09-15 SDOH — ECONOMIC STABILITY: FOOD INSECURITY: WITHIN THE PAST 12 MONTHS, THE FOOD YOU BOUGHT JUST DIDN'T LAST AND YOU DIDN'T HAVE MONEY TO GET MORE.: NEVER TRUE

## 2023-09-15 ASSESSMENT — ENCOUNTER SYMPTOMS
DIARRHEA: 0
ABDOMINAL PAIN: 0
CONSTIPATION: 0
COUGH: 0
SHORTNESS OF BREATH: 0
WHEEZING: 0
VOMITING: 0
BACK PAIN: 1
NAUSEA: 0

## 2023-09-15 NOTE — PROGRESS NOTES
9/15/23  Osiris Avery : 1950 Sex: male  Age: 68 y.o. Chief Complaint   Patient presents with    Hypertension     HPI:  68 y.o. male patient presents today for 3 month(s) follow up of chronic medical conditions, medication refills and FBW results. Patient's chart, medical, surgical and medication history all reviewed. Hypertension   The patient presents today for follow up of HTN. The problem is well controlled. Risk factors for coronary artery disease include Age > 27, male, HTN, and elevated cholesterol. Current treatments include lisinopril (Prinivil). The patient is compliant all of the time. Lifestyle changes the patient has made include  none . Today the patient is complaining of none. Hyperlipidemia  The 10-year ASCVD risk score (Ashley CALVILLO, et al., 2019) is: 23.2%    Values used to calculate the score:      Age: 68 years      Sex: Male      Is Non- : No      Diabetic: No      Tobacco smoker: No      Systolic Blood Pressure: 311 mmHg      Is BP treated: Yes      HDL Cholesterol: 54 mg/dL      Total Cholesterol: 261 mg/dL  Lipitor was stopped in March due to muscle cramping. Patient notes that he didn't have any improvement in muscle cramping with stopping statin. Cholesterol significantly worsened. Hypothyroidism  Patient presents for routine follow up of Hypothyroidism. Current symptoms: change in energy level. Patient denies diarrhea, nervousness, and weight changes. Symptoms have gradually worsened. No difficulty swallowing or masses felt. Last TSH was 1.70. Patient is currently taking levothyroxine 50 mcg. Patient having intermittent episodes of vertigo for the last 3-4 years. States they come on all of a sudden- will turn his head quickly or bend in a certain way prior to onset- and then he has to drop to the ground and sit there for a while before it stops. Becomes very nauseated and diaphoretic. Has seen ENT and Audiology without benefit.

## 2023-12-07 DIAGNOSIS — R73.01 IMPAIRED FASTING GLUCOSE: ICD-10-CM

## 2023-12-07 DIAGNOSIS — E03.9 ACQUIRED HYPOTHYROIDISM: ICD-10-CM

## 2023-12-07 DIAGNOSIS — E78.2 MIXED HYPERLIPIDEMIA: ICD-10-CM

## 2023-12-07 DIAGNOSIS — I10 PRIMARY HYPERTENSION: ICD-10-CM

## 2023-12-07 DIAGNOSIS — E55.9 VITAMIN D DEFICIENCY: ICD-10-CM

## 2023-12-07 LAB
ABSOLUTE IMMATURE GRANULOCYTE: <0.03 K/UL (ref 0–0.58)
ALBUMIN SERPL-MCNC: 4 G/DL (ref 3.5–5.2)
ALP BLD-CCNC: 91 U/L (ref 40–129)
ALT SERPL-CCNC: 9 U/L (ref 0–40)
ANION GAP SERPL CALCULATED.3IONS-SCNC: 19 MMOL/L (ref 7–16)
AST SERPL-CCNC: 18 U/L (ref 0–39)
BASOPHILS ABSOLUTE: 0.06 K/UL (ref 0–0.2)
BASOPHILS RELATIVE PERCENT: 1 % (ref 0–2)
BILIRUB SERPL-MCNC: 0.3 MG/DL (ref 0–1.2)
BILIRUBIN URINE: NEGATIVE
BUN BLDV-MCNC: 17 MG/DL (ref 6–23)
CALCIUM SERPL-MCNC: 9.9 MG/DL (ref 8.6–10.2)
CHLORIDE BLD-SCNC: 102 MMOL/L (ref 98–107)
CHOLESTEROL: 209 MG/DL
CO2: 18 MMOL/L (ref 22–29)
COLOR: YELLOW
COMMENT: NORMAL
CREAT SERPL-MCNC: 1.2 MG/DL (ref 0.7–1.2)
EOSINOPHILS ABSOLUTE: 0.1 K/UL (ref 0.05–0.5)
EOSINOPHILS RELATIVE PERCENT: 2 % (ref 0–6)
FOLATE: 10.9 NG/ML (ref 4.8–24.2)
GFR SERPL CREATININE-BSD FRML MDRD: >60 ML/MIN/1.73M2
GLUCOSE BLD-MCNC: 83 MG/DL (ref 74–99)
GLUCOSE URINE: NEGATIVE MG/DL
HBA1C MFR BLD: 5.4 % (ref 4–5.6)
HCT VFR BLD CALC: 44.2 % (ref 37–54)
HDLC SERPL-MCNC: 54 MG/DL
HEMOGLOBIN: 14 G/DL (ref 12.5–16.5)
IMMATURE GRANULOCYTES: 0 % (ref 0–5)
KETONES, URINE: NEGATIVE MG/DL
LDL CHOLESTEROL: 137 MG/DL
LEUKOCYTE ESTERASE, URINE: NEGATIVE
LYMPHOCYTES ABSOLUTE: 1.36 K/UL (ref 1.5–4)
LYMPHOCYTES RELATIVE PERCENT: 20 % (ref 20–42)
MCH RBC QN AUTO: 29.8 PG (ref 26–35)
MCHC RBC AUTO-ENTMCNC: 31.7 G/DL (ref 32–34.5)
MCV RBC AUTO: 94 FL (ref 80–99.9)
MONOCYTES ABSOLUTE: 0.56 K/UL (ref 0.1–0.95)
MONOCYTES RELATIVE PERCENT: 8 % (ref 2–12)
NEUTROPHILS ABSOLUTE: 4.7 K/UL (ref 1.8–7.3)
NEUTROPHILS RELATIVE PERCENT: 69 % (ref 43–80)
NITRITE, URINE: NEGATIVE
PDW BLD-RTO: 13.2 % (ref 11.5–15)
PH UA: 7 (ref 5–9)
PLATELET # BLD: 324 K/UL (ref 130–450)
PMV BLD AUTO: 10.6 FL (ref 7–12)
POTASSIUM SERPL-SCNC: 4.6 MMOL/L (ref 3.5–5)
PROTEIN UA: NEGATIVE MG/DL
RBC # BLD: 4.7 M/UL (ref 3.8–5.8)
SODIUM BLD-SCNC: 139 MMOL/L (ref 132–146)
SPECIFIC GRAVITY UA: 1.01 (ref 1–1.03)
T4 FREE: 1 NG/DL (ref 0.9–1.7)
TOTAL PROTEIN: 7.6 G/DL (ref 6.4–8.3)
TRIGL SERPL-MCNC: 91 MG/DL
TSH SERPL DL<=0.05 MIU/L-ACNC: 4.68 UIU/ML (ref 0.27–4.2)
TURBIDITY: CLEAR
URINE HGB: NEGATIVE
UROBILINOGEN, URINE: 0.2 EU/DL (ref 0–1)
VITAMIN B-12: 289 PG/ML (ref 211–946)
VITAMIN D 25-HYDROXY: 31.5 NG/ML (ref 30–100)
VLDLC SERPL CALC-MCNC: 18 MG/DL
WBC # BLD: 6.8 K/UL (ref 4.5–11.5)

## 2023-12-15 ENCOUNTER — OFFICE VISIT (OUTPATIENT)
Dept: FAMILY MEDICINE CLINIC | Age: 73
End: 2023-12-15

## 2023-12-15 VITALS
DIASTOLIC BLOOD PRESSURE: 74 MMHG | SYSTOLIC BLOOD PRESSURE: 122 MMHG | HEART RATE: 57 BPM | OXYGEN SATURATION: 98 % | TEMPERATURE: 98.4 F | WEIGHT: 157 LBS | BODY MASS INDEX: 26.8 KG/M2 | HEIGHT: 64 IN | RESPIRATION RATE: 16 BRPM

## 2023-12-15 DIAGNOSIS — F34.1 DYSTHYMIA: ICD-10-CM

## 2023-12-15 DIAGNOSIS — I10 PRIMARY HYPERTENSION: Primary | ICD-10-CM

## 2023-12-15 DIAGNOSIS — K21.9 GASTROESOPHAGEAL REFLUX DISEASE WITHOUT ESOPHAGITIS: ICD-10-CM

## 2023-12-15 DIAGNOSIS — N50.89 TESTICULAR MASS: ICD-10-CM

## 2023-12-15 DIAGNOSIS — E78.2 MIXED HYPERLIPIDEMIA: ICD-10-CM

## 2023-12-15 DIAGNOSIS — E03.9 ACQUIRED HYPOTHYROIDISM: ICD-10-CM

## 2023-12-15 RX ORDER — OMEPRAZOLE 40 MG/1
40 CAPSULE, DELAYED RELEASE ORAL DAILY
Qty: 90 CAPSULE | Refills: 1 | Status: SHIPPED | OUTPATIENT
Start: 2023-12-15

## 2023-12-15 RX ORDER — ATORVASTATIN CALCIUM 20 MG/1
20 TABLET, FILM COATED ORAL DAILY
Qty: 90 TABLET | Refills: 1 | Status: SHIPPED | OUTPATIENT
Start: 2023-12-15

## 2023-12-15 RX ORDER — LISINOPRIL 10 MG/1
10 TABLET ORAL DAILY
Qty: 100 TABLET | Refills: 1 | Status: CANCELLED | OUTPATIENT
Start: 2023-12-15

## 2023-12-15 RX ORDER — OMEPRAZOLE 40 MG/1
40 CAPSULE, DELAYED RELEASE ORAL DAILY
Qty: 90 CAPSULE | Refills: 1 | Status: CANCELLED | OUTPATIENT
Start: 2023-12-15 | End: 2024-03-14

## 2023-12-15 RX ORDER — LEVOTHYROXINE SODIUM 0.05 MG/1
50 TABLET ORAL DAILY
Qty: 90 TABLET | Refills: 1 | Status: SHIPPED | OUTPATIENT
Start: 2023-12-15

## 2023-12-15 RX ORDER — LISINOPRIL 10 MG/1
10 TABLET ORAL DAILY
Qty: 100 TABLET | Refills: 1 | Status: SHIPPED | OUTPATIENT
Start: 2023-12-15

## 2023-12-15 RX ORDER — LEVOTHYROXINE SODIUM 0.05 MG/1
50 TABLET ORAL DAILY
Qty: 90 TABLET | Refills: 1 | Status: CANCELLED | OUTPATIENT
Start: 2023-12-15

## 2023-12-15 RX ORDER — NAPROXEN 500 MG/1
500 TABLET ORAL 2 TIMES DAILY WITH MEALS
Qty: 60 TABLET | Refills: 5 | Status: CANCELLED | OUTPATIENT
Start: 2023-12-15

## 2023-12-15 RX ORDER — BUPROPION HYDROCHLORIDE 150 MG/1
150 TABLET ORAL EVERY MORNING
Qty: 90 TABLET | Refills: 1 | Status: SHIPPED | OUTPATIENT
Start: 2023-12-15

## 2023-12-15 NOTE — PROGRESS NOTES
\"RUCREAT\"  FLP:    Lab Results   Component Value Date/Time    TRIG 91 12/07/2023 10:45 AM    HDL 54 12/07/2023 10:45 AM    LDLCALC 135 06/13/2023 08:03 AM    LABVLDL 27 06/13/2023 08:03 AM     TSH:    Lab Results   Component Value Date/Time    TSH 4.68 12/07/2023 10:45 AM     VITAMIN B12: No components found for: \"B12\"  IRON:    Lab Results   Component Value Date/Time    IRON 110 04/26/2022 10:35 AM     FERRITIN:    Lab Results   Component Value Date/Time    FERRITIN 283 04/26/2022 10:35 AM          Assessment and Plan:  Ken Rinne was seen today for hypertension. Diagnoses and all orders for this visit:    Primary hypertension  -     lisinopril (PRINIVIL;ZESTRIL) 10 MG tablet; Take 1 tablet by mouth daily  Well controlled. Labs reviewed in detail. Repeat in 6 months. Acquired hypothyroidism  -     levothyroxine (SYNTHROID) 50 MCG tablet; Take 1 tablet by mouth daily  Encouraged patient to take consistent. TSH elevated today. Will recheck in 6 months. Mixed hyperlipidemia  -     atorvastatin (LIPITOR) 20 MG tablet; Take 1 tablet by mouth daily    Gastroesophageal reflux disease without esophagitis  -     omeprazole (PRILOSEC) 40 MG delayed release capsule; Take 1 capsule by mouth Daily    Dysthymia  -     buPROPion (WELLBUTRIN XL) 150 MG extended release tablet; Take 1 tablet by mouth every morning  Unclear if helpful because he isn't taking daily. Recommend daily use. Testicular mass  -     US SCROTUM AND TESTICLES; Future  Check US and then refer to Uro        Return in about 3 months (around 3/15/2024), or if symptoms worsen or fail to improve.       Seen By:  Tressa Lay DO

## 2024-02-29 DIAGNOSIS — E55.9 VITAMIN D DEFICIENCY: ICD-10-CM

## 2024-02-29 RX ORDER — ERGOCALCIFEROL 1.25 MG/1
50000 CAPSULE ORAL WEEKLY
Qty: 12 CAPSULE | Refills: 1 | OUTPATIENT
Start: 2024-02-29

## 2024-03-29 ENCOUNTER — OFFICE VISIT (OUTPATIENT)
Dept: FAMILY MEDICINE CLINIC | Age: 74
End: 2024-03-29

## 2024-03-29 VITALS
TEMPERATURE: 98.5 F | WEIGHT: 159 LBS | BODY MASS INDEX: 27.14 KG/M2 | SYSTOLIC BLOOD PRESSURE: 128 MMHG | OXYGEN SATURATION: 98 % | RESPIRATION RATE: 18 BRPM | HEIGHT: 64 IN | HEART RATE: 62 BPM | DIASTOLIC BLOOD PRESSURE: 82 MMHG

## 2024-03-29 DIAGNOSIS — E55.9 VITAMIN D DEFICIENCY: ICD-10-CM

## 2024-03-29 DIAGNOSIS — E03.9 ACQUIRED HYPOTHYROIDISM: ICD-10-CM

## 2024-03-29 DIAGNOSIS — N18.31 STAGE 3A CHRONIC KIDNEY DISEASE (HCC): ICD-10-CM

## 2024-03-29 DIAGNOSIS — Z12.5 SCREENING FOR PROSTATE CANCER: ICD-10-CM

## 2024-03-29 DIAGNOSIS — R73.01 IMPAIRED FASTING GLUCOSE: ICD-10-CM

## 2024-03-29 DIAGNOSIS — I10 PRIMARY HYPERTENSION: Primary | ICD-10-CM

## 2024-03-29 DIAGNOSIS — Z96.651 STATUS POST TOTAL KNEE REPLACEMENT, RIGHT: ICD-10-CM

## 2024-03-29 RX ORDER — CLOBETASOL PROPIONATE 0.46 MG/ML
SOLUTION TOPICAL
COMMUNITY
Start: 2024-03-05

## 2024-03-29 RX ORDER — CELECOXIB 100 MG/1
100 CAPSULE ORAL 2 TIMES DAILY
Qty: 60 CAPSULE | Refills: 2 | Status: SHIPPED | OUTPATIENT
Start: 2024-03-29

## 2024-03-29 RX ORDER — LISINOPRIL 10 MG/1
10 TABLET ORAL DAILY
Qty: 100 TABLET | Refills: 1 | Status: SHIPPED | OUTPATIENT
Start: 2024-03-29

## 2024-03-29 RX ORDER — NAPROXEN 500 MG/1
500 TABLET ORAL 2 TIMES DAILY WITH MEALS
Qty: 60 TABLET | Refills: 5 | Status: CANCELLED | OUTPATIENT
Start: 2024-03-29

## 2024-03-29 RX ORDER — LEVOTHYROXINE SODIUM 0.05 MG/1
50 TABLET ORAL DAILY
Qty: 90 TABLET | Refills: 1 | Status: SHIPPED | OUTPATIENT
Start: 2024-03-29

## 2024-03-29 ASSESSMENT — PATIENT HEALTH QUESTIONNAIRE - PHQ9
8. MOVING OR SPEAKING SO SLOWLY THAT OTHER PEOPLE COULD HAVE NOTICED. OR THE OPPOSITE, BEING SO FIGETY OR RESTLESS THAT YOU HAVE BEEN MOVING AROUND A LOT MORE THAN USUAL: NOT AT ALL
3. TROUBLE FALLING OR STAYING ASLEEP: NOT AT ALL
4. FEELING TIRED OR HAVING LITTLE ENERGY: NOT AT ALL
10. IF YOU CHECKED OFF ANY PROBLEMS, HOW DIFFICULT HAVE THESE PROBLEMS MADE IT FOR YOU TO DO YOUR WORK, TAKE CARE OF THINGS AT HOME, OR GET ALONG WITH OTHER PEOPLE: NOT DIFFICULT AT ALL
SUM OF ALL RESPONSES TO PHQ QUESTIONS 1-9: 0
9. THOUGHTS THAT YOU WOULD BE BETTER OFF DEAD, OR OF HURTING YOURSELF: NOT AT ALL
SUM OF ALL RESPONSES TO PHQ9 QUESTIONS 1 & 2: 0
7. TROUBLE CONCENTRATING ON THINGS, SUCH AS READING THE NEWSPAPER OR WATCHING TELEVISION: NOT AT ALL
SUM OF ALL RESPONSES TO PHQ QUESTIONS 1-9: 0
5. POOR APPETITE OR OVEREATING: NOT AT ALL
SUM OF ALL RESPONSES TO PHQ QUESTIONS 1-9: 0
2. FEELING DOWN, DEPRESSED OR HOPELESS: NOT AT ALL
SUM OF ALL RESPONSES TO PHQ QUESTIONS 1-9: 0
6. FEELING BAD ABOUT YOURSELF - OR THAT YOU ARE A FAILURE OR HAVE LET YOURSELF OR YOUR FAMILY DOWN: NOT AT ALL
1. LITTLE INTEREST OR PLEASURE IN DOING THINGS: NOT AT ALL

## 2024-03-29 ASSESSMENT — ENCOUNTER SYMPTOMS
DIARRHEA: 0
BACK PAIN: 1
NAUSEA: 0
CONSTIPATION: 0
COUGH: 0
ABDOMINAL PAIN: 0
SHORTNESS OF BREATH: 0
VOMITING: 0
WHEEZING: 0

## 2024-03-29 NOTE — PROGRESS NOTES
3/29/24  Vito Campos : 1950 Sex: male  Age: 73 y.o.    Chief Complaint   Patient presents with    Hypertension     HPI:  73 y.o. male patient presents today for 3 month(s) follow up of chronic medical conditions, medication refills and FBW.  Patient's chart, medical, surgical and medication history all reviewed.    Hypertension   The patient presents today for follow up of HTN.  The problem is well controlled. Risk factors for coronary artery disease include Age > 30, male, HTN, and elevated cholesterol. Current treatments include lisinopril (Prinivil). The patient is compliant all of the time.  Lifestyle changes the patient has made include  none .  Today the patient is complaining of none.        Hyperlipidemia  The 10-year ASCVD risk score (Ashley CALVILLO, et al., 2019) is: 23.4%    Values used to calculate the score:      Age: 73 years      Sex: Male      Is Non- : No      Diabetic: No      Tobacco smoker: No      Systolic Blood Pressure: 128 mmHg      Is BP treated: Yes      HDL Cholesterol: 54 mg/dL      Total Cholesterol: 209 mg/dL  Lipitor was stopped in March due to muscle cramping.  Patient notes that he didn't have any improvement in muscle cramping with stopping statin.  Cholesterol significantly worsened.    Hypothyroidism  Patient presents for routine follow up of Hypothyroidism. Current symptoms: change in energy level. Patient denies diarrhea, nervousness, and weight changes. Symptoms have gradually worsened. No difficulty swallowing or masses felt.  Last TSH was 4.68.  Patient is currently taking levothyroxine 50 mcg.    ROS:  Review of Systems   Constitutional:  Negative for chills, fatigue and fever.   Respiratory:  Negative for cough, shortness of breath and wheezing.    Cardiovascular:  Negative for chest pain and palpitations.   Gastrointestinal:  Negative for abdominal pain, constipation, diarrhea, nausea and vomiting.   Musculoskeletal:  Positive for

## 2024-06-20 DIAGNOSIS — E03.9 ACQUIRED HYPOTHYROIDISM: ICD-10-CM

## 2024-06-20 DIAGNOSIS — E55.9 VITAMIN D DEFICIENCY: ICD-10-CM

## 2024-06-20 DIAGNOSIS — R73.01 IMPAIRED FASTING GLUCOSE: ICD-10-CM

## 2024-06-20 DIAGNOSIS — Z12.5 SCREENING FOR PROSTATE CANCER: ICD-10-CM

## 2024-06-20 DIAGNOSIS — I10 PRIMARY HYPERTENSION: ICD-10-CM

## 2024-06-20 DIAGNOSIS — N18.31 STAGE 3A CHRONIC KIDNEY DISEASE (HCC): ICD-10-CM

## 2024-06-20 LAB
ALBUMIN: 4.1 G/DL (ref 3.5–5.2)
ALP BLD-CCNC: 72 U/L (ref 40–129)
ALT SERPL-CCNC: 12 U/L (ref 0–40)
ANION GAP SERPL CALCULATED.3IONS-SCNC: 13 MMOL/L (ref 7–16)
AST SERPL-CCNC: 22 U/L (ref 0–39)
BASOPHILS ABSOLUTE: 0.04 K/UL (ref 0–0.2)
BASOPHILS RELATIVE PERCENT: 1 % (ref 0–2)
BILIRUB SERPL-MCNC: 0.3 MG/DL (ref 0–1.2)
BILIRUBIN, URINE: NEGATIVE
BUN BLDV-MCNC: 23 MG/DL (ref 6–23)
CALCIUM SERPL-MCNC: 9.1 MG/DL (ref 8.6–10.2)
CHLORIDE BLD-SCNC: 105 MMOL/L (ref 98–107)
CHOLESTEROL, TOTAL: 243 MG/DL
CO2: 20 MMOL/L (ref 22–29)
COLOR: YELLOW
COMMENT: ABNORMAL
CREAT SERPL-MCNC: 1.2 MG/DL (ref 0.7–1.2)
EOSINOPHILS ABSOLUTE: 0.19 K/UL (ref 0.05–0.5)
EOSINOPHILS RELATIVE PERCENT: 4 % (ref 0–6)
GFR, ESTIMATED: 63 ML/MIN/1.73M2
GLUCOSE BLD-MCNC: 96 MG/DL (ref 74–99)
GLUCOSE URINE: NEGATIVE MG/DL
HBA1C MFR BLD: 5.5 % (ref 4–5.6)
HCT VFR BLD CALC: 43.4 % (ref 37–54)
HDLC SERPL-MCNC: 62 MG/DL
HEMOGLOBIN: 13.5 G/DL (ref 12.5–16.5)
IMMATURE GRANULOCYTES %: 0 % (ref 0–5)
IMMATURE GRANULOCYTES ABSOLUTE: <0.03 K/UL (ref 0–0.58)
KETONES, URINE: NEGATIVE MG/DL
LDL CHOLESTEROL: 162 MG/DL
LEUKOCYTE ESTERASE, URINE: NEGATIVE
LYMPHOCYTES ABSOLUTE: 1.15 K/UL (ref 1.5–4)
LYMPHOCYTES RELATIVE PERCENT: 25 % (ref 20–42)
MAGNESIUM: 2.1 MG/DL (ref 1.6–2.6)
MCH RBC QN AUTO: 30.2 PG (ref 26–35)
MCHC RBC AUTO-ENTMCNC: 31.1 G/DL (ref 32–34.5)
MCV RBC AUTO: 97.1 FL (ref 80–99.9)
MONOCYTES ABSOLUTE: 0.44 K/UL (ref 0.1–0.95)
MONOCYTES RELATIVE PERCENT: 10 % (ref 2–12)
NEUTROPHILS ABSOLUTE: 2.78 K/UL (ref 1.8–7.3)
NEUTROPHILS RELATIVE PERCENT: 60 % (ref 43–80)
NITRITE, URINE: NEGATIVE
PDW BLD-RTO: 14.1 % (ref 11.5–15)
PH, URINE: 6 (ref 5–9)
PLATELET # BLD: 328 K/UL (ref 130–450)
PMV BLD AUTO: 10.9 FL (ref 7–12)
POTASSIUM SERPL-SCNC: 4.7 MMOL/L (ref 3.5–5)
PROSTATE SPECIFIC ANTIGEN: 2.01 NG/ML (ref 0–4)
PROTEIN UA: NEGATIVE MG/DL
RBC # BLD: 4.47 M/UL (ref 3.8–5.8)
SODIUM BLD-SCNC: 138 MMOL/L (ref 132–146)
SPECIFIC GRAVITY UA: >1.03 (ref 1–1.03)
T4 FREE: 1 NG/DL (ref 0.9–1.7)
TOTAL PROTEIN: 7.3 G/DL (ref 6.4–8.3)
TRIGL SERPL-MCNC: 94 MG/DL
TSH SERPL DL<=0.05 MIU/L-ACNC: 4.33 UIU/ML (ref 0.27–4.2)
TURBIDITY: CLEAR
URIC ACID: 6.1 MG/DL (ref 3.4–7)
URINE HGB: NEGATIVE
UROBILINOGEN, URINE: 0.2 EU/DL (ref 0–1)
VITAMIN D 25-HYDROXY: 31.9 NG/ML (ref 30–100)
VLDLC SERPL CALC-MCNC: 19 MG/DL
WBC # BLD: 4.6 K/UL (ref 4.5–11.5)

## 2024-06-28 ENCOUNTER — OFFICE VISIT (OUTPATIENT)
Dept: FAMILY MEDICINE CLINIC | Age: 74
End: 2024-06-28

## 2024-06-28 VITALS
HEIGHT: 64 IN | BODY MASS INDEX: 26.46 KG/M2 | TEMPERATURE: 98 F | SYSTOLIC BLOOD PRESSURE: 132 MMHG | OXYGEN SATURATION: 99 % | HEART RATE: 53 BPM | DIASTOLIC BLOOD PRESSURE: 82 MMHG | RESPIRATION RATE: 16 BRPM | WEIGHT: 155 LBS

## 2024-06-28 DIAGNOSIS — K21.9 GASTROESOPHAGEAL REFLUX DISEASE WITHOUT ESOPHAGITIS: ICD-10-CM

## 2024-06-28 DIAGNOSIS — E55.9 VITAMIN D INSUFFICIENCY: ICD-10-CM

## 2024-06-28 DIAGNOSIS — Z00.00 MEDICARE ANNUAL WELLNESS VISIT, SUBSEQUENT: Primary | ICD-10-CM

## 2024-06-28 DIAGNOSIS — E03.9 ACQUIRED HYPOTHYROIDISM: ICD-10-CM

## 2024-06-28 DIAGNOSIS — Z12.11 SCREENING FOR COLON CANCER: ICD-10-CM

## 2024-06-28 DIAGNOSIS — I10 PRIMARY HYPERTENSION: ICD-10-CM

## 2024-06-28 DIAGNOSIS — F34.1 DYSTHYMIA: ICD-10-CM

## 2024-06-28 DIAGNOSIS — E78.2 MIXED HYPERLIPIDEMIA: ICD-10-CM

## 2024-06-28 RX ORDER — OMEPRAZOLE 40 MG/1
40 CAPSULE, DELAYED RELEASE ORAL DAILY
Qty: 90 CAPSULE | Refills: 1 | Status: SHIPPED | OUTPATIENT
Start: 2024-06-28

## 2024-06-28 RX ORDER — ATORVASTATIN CALCIUM 20 MG/1
20 TABLET, FILM COATED ORAL DAILY
Qty: 90 TABLET | Refills: 1 | Status: SHIPPED | OUTPATIENT
Start: 2024-06-28

## 2024-06-28 RX ORDER — LEVOTHYROXINE SODIUM 0.07 MG/1
75 TABLET ORAL DAILY
Qty: 90 TABLET | Refills: 1 | Status: SHIPPED | OUTPATIENT
Start: 2024-06-28

## 2024-06-28 RX ORDER — BUPROPION HYDROCHLORIDE 150 MG/1
150 TABLET ORAL EVERY MORNING
Qty: 90 TABLET | Refills: 1 | Status: SHIPPED | OUTPATIENT
Start: 2024-06-28

## 2024-06-28 ASSESSMENT — PATIENT HEALTH QUESTIONNAIRE - PHQ9
9. THOUGHTS THAT YOU WOULD BE BETTER OFF DEAD, OR OF HURTING YOURSELF: NOT AT ALL
2. FEELING DOWN, DEPRESSED OR HOPELESS: NOT AT ALL
6. FEELING BAD ABOUT YOURSELF - OR THAT YOU ARE A FAILURE OR HAVE LET YOURSELF OR YOUR FAMILY DOWN: NOT AT ALL
1. LITTLE INTEREST OR PLEASURE IN DOING THINGS: NOT AT ALL
SUM OF ALL RESPONSES TO PHQ QUESTIONS 1-9: 0
4. FEELING TIRED OR HAVING LITTLE ENERGY: NOT AT ALL
SUM OF ALL RESPONSES TO PHQ9 QUESTIONS 1 & 2: 0
3. TROUBLE FALLING OR STAYING ASLEEP: NOT AT ALL
SUM OF ALL RESPONSES TO PHQ QUESTIONS 1-9: 0
10. IF YOU CHECKED OFF ANY PROBLEMS, HOW DIFFICULT HAVE THESE PROBLEMS MADE IT FOR YOU TO DO YOUR WORK, TAKE CARE OF THINGS AT HOME, OR GET ALONG WITH OTHER PEOPLE: NOT DIFFICULT AT ALL
8. MOVING OR SPEAKING SO SLOWLY THAT OTHER PEOPLE COULD HAVE NOTICED. OR THE OPPOSITE, BEING SO FIGETY OR RESTLESS THAT YOU HAVE BEEN MOVING AROUND A LOT MORE THAN USUAL: NOT AT ALL
SUM OF ALL RESPONSES TO PHQ QUESTIONS 1-9: 0
7. TROUBLE CONCENTRATING ON THINGS, SUCH AS READING THE NEWSPAPER OR WATCHING TELEVISION: NOT AT ALL
5. POOR APPETITE OR OVEREATING: NOT AT ALL
SUM OF ALL RESPONSES TO PHQ QUESTIONS 1-9: 0

## 2024-06-28 ASSESSMENT — LIFESTYLE VARIABLES
HOW OFTEN DO YOU HAVE A DRINK CONTAINING ALCOHOL: NEVER
HOW MANY STANDARD DRINKS CONTAINING ALCOHOL DO YOU HAVE ON A TYPICAL DAY: PATIENT DOES NOT DRINK

## 2024-06-28 NOTE — PATIENT INSTRUCTIONS
Personalized Preventive Plan for Vito Campos - 6/28/2024  Medicare offers a range of preventive health benefits. Some of the tests and screenings are paid in full while other may be subject to a deductible, co-insurance, and/or copay.    Some of these benefits include a comprehensive review of your medical history including lifestyle, illnesses that may run in your family, and various assessments and screenings as appropriate.    After reviewing your medical record and screening and assessments performed today your provider may have ordered immunizations, labs, imaging, and/or referrals for you.  A list of these orders (if applicable) as well as your Preventive Care list are included within your After Visit Summary for your review.    Other Preventive Recommendations:    A preventive eye exam performed by an eye specialist is recommended every 1-2 years to screen for glaucoma; cataracts, macular degeneration, and other eye disorders.  A preventive dental visit is recommended every 6 months.  Try to get at least 150 minutes of exercise per week or 10,000 steps per day on a pedometer .  Order or download the FREE \"Exercise & Physical Activity: Your Everyday Guide\" from [employer]. Call 1-685.728.8347 or search [employer] online.  You need 0463-9416 mg of calcium and 5543-7257 IU of vitamin D per day. It is possible to meet your calcium requirement with diet alone, but a vitamin D supplement is usually necessary to meet this goal.  When exposed to the sun, use a sunscreen that protects against both UVA and UVB radiation with an SPF of 30 or greater. Reapply every 2 to 3 hours or after sweating, drying off with a towel, or swimming.  Always wear a seat belt when traveling in a car. Always wear a helmet when riding a bicycle or motorcycle.

## 2024-06-28 NOTE — PROGRESS NOTES
senna (SENOKOT) 8.6 MG tablet Take 1 tablet by mouth Yes Provider, Historical, MD       CareTeam (Including outside providers/suppliers regularly involved in providing care):   Patient Care Team:  Brianna Obando DO as PCP - General (Family Medicine)  Brianna Obando DO as PCP - Empaneled Provider  Yosef Vines MD as Consulting Physician (Cardiology)  Bandar Rowley MD as Consulting Physician (Cardiothoracic Surgery)  Sven Lopez MD as Consulting Physician (Orthopedic Surgery)  Chucho Guzmán DO as Consulting Physician (Dermatology)  Emmett Thurman Jr., DPM as Consulting Physician (Podiatry)     Reviewed and updated this visit:  Tobacco  Allergies  Meds  Problems  Med Hx  Surg Hx  Soc Hx  Fam Hx

## 2024-07-16 LAB — NONINV COLON CA DNA+OCC BLD SCRN STL QL: NEGATIVE

## 2024-10-29 ENCOUNTER — TELEPHONE (OUTPATIENT)
Dept: PHARMACY | Facility: CLINIC | Age: 74
End: 2024-10-29

## 2024-10-29 NOTE — TELEPHONE ENCOUNTER
[facility] CLINICAL PHARMACY: ADHERENCE REVIEW  Identified care gap per [employer]: fills at [facility]: Statin adherence    Patient also appears to be prescribed: ACE/ARB (no lisinopri 10mg daily fills YTD; per reconcile dispense hx, 100ds last filled 10/29/23)    ASSESSMENT  STATIN ADHERENCE    Insurance Records claims through 10/21/24 (Prior Year PDC = not reported; YTD PDC = FIRST FILL; Potential Fail Date: 24):   ATORVASTATIN TAB 20MG last filled on 24 for 90 day supply. Next refill due: 24    Prescribed si tablet/capsule daily    Per chart, should have refill remaining    Lab Results   Component Value Date    CHOL 243 (H) 2024    TRIG 94 2024    HDL 62 2024     Lab Results   Component Value Date     (H) 2024      ALT   Date Value Ref Range Status   2024 12 0 - 40 U/L Final     AST   Date Value Ref Range Status   2024 22 0 - 39 U/L Final     The 10-year ASCVD risk score (Ashley CALVILLO, et al., 2019) is: 26.4%    Values used to calculate the score:      Age: 74 years      Sex: Male      Is Non- : No      Diabetic: No      Tobacco smoker: No      Systolic Blood Pressure: 132 mmHg      Is BP treated: Yes      HDL Cholesterol: 62 mg/dL      Total Cholesterol: 243 mg/dL     PLAN  The following are interventions that have been identified:   Patient OVERDUE refilling atorvastatin 20mg daily and active on home medication list.   Need to confirm if patient is taking lisinopril 10mg daily? (No fills YTD?)    Attempting to reach patient to review - unable to leave message.    Per [facility]: will get atorvastatin 90 day supply ready to  since past due. Will be ready after 2p tomorrow.    Letter sent to patient.     Last Visit: 24  Next Visit: 1/3/25    Cecilia Wilhelm PharmD, BCACP  Population Health Pharmacist  [facility]  Department, toll free: [phone], option 1

## 2024-11-07 DIAGNOSIS — Z96.651 STATUS POST TOTAL KNEE REPLACEMENT, RIGHT: ICD-10-CM

## 2024-11-08 RX ORDER — NAPROXEN 500 MG/1
500 TABLET ORAL 2 TIMES DAILY WITH MEALS
Qty: 60 TABLET | Refills: 5 | OUTPATIENT
Start: 2024-11-08

## 2024-12-10 DIAGNOSIS — N52.9 VASCULOGENIC ERECTILE DYSFUNCTION, UNSPECIFIED VASCULOGENIC ERECTILE DYSFUNCTION TYPE: ICD-10-CM

## 2024-12-10 RX ORDER — SILDENAFIL CITRATE 20 MG/1
20 TABLET ORAL DAILY PRN
Qty: 30 TABLET | Refills: 5 | Status: SHIPPED | OUTPATIENT
Start: 2024-12-10

## 2024-12-10 NOTE — TELEPHONE ENCOUNTER
Name of Medication(s) Requested:  Requested Prescriptions     Pending Prescriptions Disp Refills    sildenafil (REVATIO) 20 MG tablet 30 tablet 5     Sig: Take 1 tablet by mouth daily as needed (ED)       Medication is on current medication list Yes    Dosage and directions were verified? Yes    Quantity verified: 30 day supply     Pharmacy Verified?  Yes    Last Appointment:  6/28/2024    Future appts:  Future Appointments   Date Time Provider Department Center   1/3/2025  8:15 AM Brianna Obando DO [facility]        (If no appt send self scheduling link. .REFILLAPPT)  Scheduling request sent?     [] Yes  [x] No    Does patient need updated?  [] Yes  [x] No

## 2024-12-23 DIAGNOSIS — Z00.00 MEDICARE ANNUAL WELLNESS VISIT, SUBSEQUENT: ICD-10-CM

## 2024-12-23 DIAGNOSIS — E55.9 VITAMIN D INSUFFICIENCY: ICD-10-CM

## 2024-12-23 DIAGNOSIS — I10 PRIMARY HYPERTENSION: ICD-10-CM

## 2024-12-23 DIAGNOSIS — E78.2 MIXED HYPERLIPIDEMIA: ICD-10-CM

## 2024-12-23 DIAGNOSIS — K21.9 GASTROESOPHAGEAL REFLUX DISEASE WITHOUT ESOPHAGITIS: ICD-10-CM

## 2024-12-23 LAB
ALBUMIN: 3.9 G/DL (ref 3.5–5.2)
ALP BLD-CCNC: 81 U/L (ref 40–129)
ALT SERPL-CCNC: 13 U/L (ref 0–40)
ANION GAP SERPL CALCULATED.3IONS-SCNC: 12 MMOL/L (ref 7–16)
AST SERPL-CCNC: 19 U/L (ref 0–39)
BASOPHILS ABSOLUTE: 0.04 K/UL (ref 0–0.2)
BASOPHILS RELATIVE PERCENT: 1 % (ref 0–2)
BILIRUB SERPL-MCNC: 0.4 MG/DL (ref 0–1.2)
BILIRUBIN, URINE: NEGATIVE
BUN BLDV-MCNC: 17 MG/DL (ref 6–23)
CALCIUM SERPL-MCNC: 9.3 MG/DL (ref 8.6–10.2)
CHLORIDE BLD-SCNC: 105 MMOL/L (ref 98–107)
CHOLESTEROL, TOTAL: 186 MG/DL
CO2: 25 MMOL/L (ref 22–29)
COLOR, UA: YELLOW
CREAT SERPL-MCNC: 1.2 MG/DL (ref 0.7–1.2)
EOSINOPHILS ABSOLUTE: 0.25 K/UL (ref 0.05–0.5)
EOSINOPHILS RELATIVE PERCENT: 3 % (ref 0–6)
GFR, ESTIMATED: 62 ML/MIN/1.73M2
GLUCOSE BLD-MCNC: 96 MG/DL (ref 74–99)
GLUCOSE URINE: NEGATIVE MG/DL
HBA1C MFR BLD: 5.4 % (ref 4–5.6)
HCT VFR BLD CALC: 43.9 % (ref 37–54)
HDLC SERPL-MCNC: 51 MG/DL
HEMOGLOBIN: 14.1 G/DL (ref 12.5–16.5)
IMMATURE GRANULOCYTES %: 0 % (ref 0–5)
IMMATURE GRANULOCYTES ABSOLUTE: <0.03 K/UL (ref 0–0.58)
KETONES, URINE: NEGATIVE MG/DL
LDL CHOLESTEROL: 120 MG/DL
LEUKOCYTE ESTERASE, URINE: NEGATIVE
LYMPHOCYTES ABSOLUTE: 1.75 K/UL (ref 1.5–4)
LYMPHOCYTES RELATIVE PERCENT: 24 % (ref 20–42)
MCH RBC QN AUTO: 29.8 PG (ref 26–35)
MCHC RBC AUTO-ENTMCNC: 32.1 G/DL (ref 32–34.5)
MCV RBC AUTO: 92.8 FL (ref 80–99.9)
MONOCYTES ABSOLUTE: 0.68 K/UL (ref 0.1–0.95)
MONOCYTES RELATIVE PERCENT: 9 % (ref 2–12)
NEUTROPHILS ABSOLUTE: 4.61 K/UL (ref 1.8–7.3)
NEUTROPHILS RELATIVE PERCENT: 63 % (ref 43–80)
NITRITE, URINE: NEGATIVE
PDW BLD-RTO: 12.8 % (ref 11.5–15)
PH, URINE: 6 (ref 5–9)
PLATELET # BLD: 330 K/UL (ref 130–450)
PMV BLD AUTO: 10.7 FL (ref 7–12)
POTASSIUM SERPL-SCNC: 4.2 MMOL/L (ref 3.5–5)
PROTEIN UA: ABNORMAL MG/DL
RBC # BLD: 4.73 M/UL (ref 3.8–5.8)
RBC UA: NORMAL /HPF
SODIUM BLD-SCNC: 142 MMOL/L (ref 132–146)
SPECIFIC GRAVITY UA: 1.02 (ref 1–1.03)
T4 FREE: 1.2 NG/DL (ref 0.9–1.7)
TOTAL PROTEIN: 7.1 G/DL (ref 6.4–8.3)
TRIGL SERPL-MCNC: 76 MG/DL
TSH SERPL DL<=0.05 MIU/L-ACNC: 3.69 UIU/ML (ref 0.27–4.2)
TURBIDITY: CLEAR
URIC ACID: 5.7 MG/DL (ref 3.4–7)
URINE HGB: NEGATIVE
UROBILINOGEN, URINE: 0.2 EU/DL (ref 0–1)
VITAMIN D 25-HYDROXY: 23.4 NG/ML (ref 30–100)
VLDLC SERPL CALC-MCNC: 15 MG/DL
WBC # BLD: 7.4 K/UL (ref 4.5–11.5)
WBC UA: NORMAL /HPF

## 2025-01-03 ENCOUNTER — OFFICE VISIT (OUTPATIENT)
Dept: FAMILY MEDICINE CLINIC | Age: 75
End: 2025-01-03

## 2025-01-03 VITALS
SYSTOLIC BLOOD PRESSURE: 130 MMHG | OXYGEN SATURATION: 98 % | TEMPERATURE: 98 F | DIASTOLIC BLOOD PRESSURE: 78 MMHG | HEART RATE: 54 BPM | WEIGHT: 160 LBS | RESPIRATION RATE: 16 BRPM | BODY MASS INDEX: 27.31 KG/M2 | HEIGHT: 64 IN

## 2025-01-03 DIAGNOSIS — N18.31 STAGE 3A CHRONIC KIDNEY DISEASE (HCC): ICD-10-CM

## 2025-01-03 DIAGNOSIS — I10 PRIMARY HYPERTENSION: Primary | ICD-10-CM

## 2025-01-03 DIAGNOSIS — K59.09 CHRONIC CONSTIPATION: ICD-10-CM

## 2025-01-03 DIAGNOSIS — E78.2 MIXED HYPERLIPIDEMIA: ICD-10-CM

## 2025-01-03 DIAGNOSIS — E55.9 VITAMIN D INSUFFICIENCY: ICD-10-CM

## 2025-01-03 DIAGNOSIS — R73.01 IMPAIRED FASTING GLUCOSE: ICD-10-CM

## 2025-01-03 DIAGNOSIS — Z96.651 STATUS POST TOTAL KNEE REPLACEMENT, RIGHT: ICD-10-CM

## 2025-01-03 DIAGNOSIS — F34.1 DYSTHYMIA: ICD-10-CM

## 2025-01-03 DIAGNOSIS — K21.9 GASTROESOPHAGEAL REFLUX DISEASE WITHOUT ESOPHAGITIS: ICD-10-CM

## 2025-01-03 DIAGNOSIS — E03.9 ACQUIRED HYPOTHYROIDISM: ICD-10-CM

## 2025-01-03 RX ORDER — LEVOTHYROXINE SODIUM 75 UG/1
75 TABLET ORAL DAILY
Qty: 90 TABLET | Refills: 1 | Status: SHIPPED | OUTPATIENT
Start: 2025-01-03

## 2025-01-03 RX ORDER — CELECOXIB 100 MG/1
100 CAPSULE ORAL 2 TIMES DAILY
Qty: 60 CAPSULE | Refills: 2 | Status: CANCELLED | OUTPATIENT
Start: 2025-01-03

## 2025-01-03 RX ORDER — ATORVASTATIN CALCIUM 20 MG/1
20 TABLET, FILM COATED ORAL DAILY
Qty: 90 TABLET | Refills: 1 | Status: SHIPPED | OUTPATIENT
Start: 2025-01-03

## 2025-01-03 RX ORDER — MULTIVIT-MIN/IRON/FOLIC ACID/K 18-600-40
2000 CAPSULE ORAL DAILY
Qty: 90 CAPSULE | Refills: 1 | Status: SHIPPED | OUTPATIENT
Start: 2025-01-03

## 2025-01-03 RX ORDER — NAPROXEN 500 MG/1
500 TABLET ORAL 2 TIMES DAILY WITH MEALS
Qty: 180 TABLET | Refills: 1 | Status: SHIPPED | OUTPATIENT
Start: 2025-01-03

## 2025-01-03 RX ORDER — BUPROPION HYDROCHLORIDE 150 MG/1
150 TABLET ORAL EVERY MORNING
Qty: 90 TABLET | Refills: 1 | Status: SHIPPED | OUTPATIENT
Start: 2025-01-03

## 2025-01-03 RX ORDER — LISINOPRIL 10 MG/1
10 TABLET ORAL DAILY
Qty: 90 TABLET | Refills: 1 | Status: SHIPPED | OUTPATIENT
Start: 2025-01-03

## 2025-01-03 RX ORDER — OMEPRAZOLE 40 MG/1
40 CAPSULE, DELAYED RELEASE ORAL DAILY
Qty: 90 CAPSULE | Refills: 1 | Status: SHIPPED | OUTPATIENT
Start: 2025-01-03

## 2025-01-03 RX ORDER — SILDENAFIL CITRATE 20 MG/1
20 TABLET ORAL DAILY PRN
Qty: 30 TABLET | Refills: 5 | Status: CANCELLED | OUTPATIENT
Start: 2025-01-03

## 2025-01-03 RX ORDER — SENNOSIDES A AND B 8.6 MG/1
1 TABLET, FILM COATED ORAL DAILY
Qty: 90 TABLET | Refills: 1 | Status: SHIPPED | OUTPATIENT
Start: 2025-01-03

## 2025-01-03 SDOH — ECONOMIC STABILITY: INCOME INSECURITY: HOW HARD IS IT FOR YOU TO PAY FOR THE VERY BASICS LIKE FOOD, HOUSING, MEDICAL CARE, AND HEATING?: NOT HARD AT ALL

## 2025-01-03 SDOH — ECONOMIC STABILITY: FOOD INSECURITY: WITHIN THE PAST 12 MONTHS, THE FOOD YOU BOUGHT JUST DIDN'T LAST AND YOU DIDN'T HAVE MONEY TO GET MORE.: NEVER TRUE

## 2025-01-03 SDOH — ECONOMIC STABILITY: FOOD INSECURITY: WITHIN THE PAST 12 MONTHS, YOU WORRIED THAT YOUR FOOD WOULD RUN OUT BEFORE YOU GOT MONEY TO BUY MORE.: NEVER TRUE

## 2025-01-03 ASSESSMENT — ENCOUNTER SYMPTOMS
BACK PAIN: 1
DIARRHEA: 0
VOMITING: 0
ABDOMINAL PAIN: 0
CONSTIPATION: 0
WHEEZING: 0
SHORTNESS OF BREATH: 0
NAUSEA: 0
COUGH: 0

## 2025-01-03 ASSESSMENT — PATIENT HEALTH QUESTIONNAIRE - PHQ9
10. IF YOU CHECKED OFF ANY PROBLEMS, HOW DIFFICULT HAVE THESE PROBLEMS MADE IT FOR YOU TO DO YOUR WORK, TAKE CARE OF THINGS AT HOME, OR GET ALONG WITH OTHER PEOPLE: NOT DIFFICULT AT ALL
9. THOUGHTS THAT YOU WOULD BE BETTER OFF DEAD, OR OF HURTING YOURSELF: NOT AT ALL
SUM OF ALL RESPONSES TO PHQ QUESTIONS 1-9: 0
1. LITTLE INTEREST OR PLEASURE IN DOING THINGS: NOT AT ALL
3. TROUBLE FALLING OR STAYING ASLEEP: NOT AT ALL
8. MOVING OR SPEAKING SO SLOWLY THAT OTHER PEOPLE COULD HAVE NOTICED. OR THE OPPOSITE, BEING SO FIGETY OR RESTLESS THAT YOU HAVE BEEN MOVING AROUND A LOT MORE THAN USUAL: NOT AT ALL
2. FEELING DOWN, DEPRESSED OR HOPELESS: NOT AT ALL
SUM OF ALL RESPONSES TO PHQ QUESTIONS 1-9: 0
6. FEELING BAD ABOUT YOURSELF - OR THAT YOU ARE A FAILURE OR HAVE LET YOURSELF OR YOUR FAMILY DOWN: NOT AT ALL
5. POOR APPETITE OR OVEREATING: NOT AT ALL
4. FEELING TIRED OR HAVING LITTLE ENERGY: NOT AT ALL
SUM OF ALL RESPONSES TO PHQ QUESTIONS 1-9: 0
SUM OF ALL RESPONSES TO PHQ QUESTIONS 1-9: 0
SUM OF ALL RESPONSES TO PHQ9 QUESTIONS 1 & 2: 0
7. TROUBLE CONCENTRATING ON THINGS, SUCH AS READING THE NEWSPAPER OR WATCHING TELEVISION: NOT AT ALL

## 2025-01-03 NOTE — PROGRESS NOTES
1/3/25  Vito Campos : 1950 Sex: male  Age: 74 y.o.    Chief Complaint   Patient presents with    Hypertension     HPI:  74 y.o. male patient presents today for 3 month(s) follow up of chronic medical conditions, medication refills and FBW results.  Patient's chart, medical, surgical and medication history all reviewed.    Hypertension   The patient presents today for follow up of HTN.  The problem is well controlled. Risk factors for coronary artery disease include Age > 30, male, HTN, and elevated cholesterol. Current treatments include lisinopril (Prinivil). The patient is compliant all of the time.  Lifestyle changes the patient has made include  none .  Today the patient is complaining of none.        Hyperlipidemia  The 10-year ASCVD risk score (Ashley CALVILLO, et al., 2019) is: 25%    Values used to calculate the score:      Age: 74 years      Sex: Male      Is Non- : No      Diabetic: No      Tobacco smoker: No      Systolic Blood Pressure: 130 mmHg      Is BP treated: Yes      HDL Cholesterol: 51 mg/dL      Total Cholesterol: 186 mg/dL  Lipitor was stopped in 2024 due to muscle cramping.  Patient notes that he didn't have any improvement in muscle cramping with stopping statin.  Cholesterol significantly worsened, so Lipitor was restarted.    Hypothyroidism  Patient presents for routine follow up of Hypothyroidism. Current symptoms: change in energy level. Patient denies diarrhea, nervousness, and weight changes. Symptoms have gradually worsened. No difficulty swallowing or masses felt.    Lab Results   Component Value Date    TSH 3.69 2024   Patient is currently taking levothyroxine 75 mcg.    ROS:  Review of Systems   Constitutional:  Negative for chills, fatigue and fever.   Respiratory:  Negative for cough, shortness of breath and wheezing.    Cardiovascular:  Negative for chest pain and palpitations.   Gastrointestinal:  Negative for abdominal pain,

## 2025-01-03 NOTE — ASSESSMENT & PLAN NOTE
Better results with Naproxen than Celebrex    Orders:    naproxen (NAPROSYN) 500 MG tablet; Take 1 tablet by mouth 2 times daily (with meals)

## 2025-01-03 NOTE — ASSESSMENT & PLAN NOTE
Chronic, at goal (stable), continue current treatment plan    Orders:    lisinopril (PRINIVIL;ZESTRIL) 10 MG tablet; Take 1 tablet by mouth daily    CBC with Auto Differential; Future    Comprehensive Metabolic Panel; Future    Lipid Panel; Future    TSH; Future    Urinalysis; Future    Uric Acid; Future    T4, Free; Future

## 2025-01-03 NOTE — ASSESSMENT & PLAN NOTE
Orders:    omeprazole (PRILOSEC) 40 MG delayed release capsule; Take 1 capsule by mouth Daily

## 2025-01-03 NOTE — ASSESSMENT & PLAN NOTE
Improved since adding LIpitor back.  Continue current dosing    Orders:    atorvastatin (LIPITOR) 20 MG tablet; Take 1 tablet by mouth daily    Lipid Panel; Future

## 2025-01-03 NOTE — ASSESSMENT & PLAN NOTE
Chronic, at goal (stable), continue current treatment plan    Orders:    levothyroxine (SYNTHROID) 75 MCG tablet; Take 1 tablet by mouth daily    TSH; Future    T4, Free; Future

## 2025-01-03 NOTE — ASSESSMENT & PLAN NOTE
Labs reviewed in detail.  Add D3.    Orders:    vitamin D 50 MCG (2000 UT) CAPS capsule; Take 1 capsule by mouth daily    Vitamin D 25 Hydroxy; Future

## 2025-01-17 DIAGNOSIS — E78.2 MIXED HYPERLIPIDEMIA: ICD-10-CM

## 2025-01-17 RX ORDER — ATORVASTATIN CALCIUM 20 MG/1
20 TABLET, FILM COATED ORAL DAILY
Qty: 90 TABLET | Refills: 1 | OUTPATIENT
Start: 2025-01-17

## 2025-03-31 ENCOUNTER — TELEPHONE (OUTPATIENT)
Dept: PHARMACY | Facility: CLINIC | Age: 75
End: 2025-03-31

## 2025-03-31 NOTE — TELEPHONE ENCOUNTER
[facility] CLINICAL PHARMACY: ADHERENCE REVIEW  Identified care gap per [employer]: fills at [facility]: Statin adherence    Patient also appears to be prescribed: ACE/ARB        ASSESSMENT    STATIN ADHERENCE   Insurance Records claims through  3/27/2025  (Prior Year PDC =  48.13% - FAILED;  YTD PDC = 100%; Potential Fail Date: 6/14/2025):     Atorvastatin 20mg last filled on 1/3/2025 for 90 day supply. Next refill due: 4/3  Confirmed last filled 1/3 for 90 day supply per Med Rec Dispense Report     Prescribed sig:  Take 1 (ONE) (WHOLE) tablet by mouth ONCE daily   Last ordered on 1/3/2025  #90, 1RF        ACE/ARB ADHERENCE (not targeted med; active in EMR)  Lisinopril 10mg last ordered on 1/3/2025 #90, 1RF     Confirmed last filled 1/3 for 90 day supply per Med Rec Dispense Report; Anticipated next refill due: 4/3     Prescribed sig:  Take 1 (ONE) (WHOLE) tablet by mouth ONCE daily         PLAN  Per insurer report, LIS-0 - co-pays are based on tiers and patient is subject to coverage gap.        The following are interventions that have been identified:   Patient DUE to refill Atorvastatin and Lisinopril and active on home medication list.     Need to confirm how patient is taking both medications and update med list accordingly.  Per 1/3/25 OV note: Lipitor was stopped in March 2024 due to muscle cramping. Patient notes that he didn't have any improvement in muscle cramping with stopping statin. Cholesterol significantly worsened, so Lipitor was restarted.       Attempting to reach patient to review.  Left message asking for return call.         Last Visit: 1/3/2025 (PCP)   Next Visit: 7/11/2025 (PCP)         Jessa Massey, PharmD., BCACP  Population Health Pharmacist  [facility] Clinical Pharmacy  Department, toll free: [phone], option 1

## 2025-03-31 NOTE — TELEPHONE ENCOUNTER
Outreach:  Left message asking for return call.     Patient DUE to refill Atorvastatin and Lisinopril and active on home medication list.     Need to confirm how patient is taking both medications and update med list accordingly.      Per 1/3/25 OV note:  Lipitor was stopped in March 2024 due to muscle cramping. Patient notes that he didn't have any improvement in muscle cramping with stopping statin. Cholesterol significantly worsened, so Lipitor was restarted.         Jessa Massey, PharmD., [facility]  Population Health Pharmacist  [facility] Clinical Pharmacy  Department, toll free: [phone], option 1

## 2025-04-01 NOTE — TELEPHONE ENCOUNTER
Pt returned call and stated he is no longer taking atorvastatin (LIPITOR) 20 MG tablet once daily due to side effects.    He also stated that he does not need any refills at this time for   lisinopril (PRINIVIL;ZESTRIL) 10 MG tablet  Take 1 tablet by mouth daily     He said he still have a bunch of medication remaining.     I let him know to request a refill when he get down to his last 14 days

## 2025-04-02 NOTE — TELEPHONE ENCOUNTER
Patient returned call stating Atorvastatin was discontinued due to side effects.    Called to clarify most recent use of the Atorvastatin and when discontinued; also will see if patient tried/failed any other statins and if agreeable to trying alternative statin that has better tolerability and/or lower dose.    Will notify provider accordingly.      Prior tried/failed statin therapy:  - Atorvastatin 20m2020-current  - Rosuvastatin? (Not ordered in EMR but 2023 note stated \"Patient with significant cramping. Will HOLD Atorvastatin to see if culprit. If so, will start Crestor 5 mg instead.\"      Per 1/3/25 OV note:  Lipitor was stopped in 2024 due to muscle cramping. Patient notes that he didn't have any improvement in muscle cramping with stopping statin. Cholesterol significantly worsened, so Lipitor was restarted.      The 10-year ASCVD risk score (Ashley CALVILLO, et al., 2019) is: 25%    Lab Results   Component Value Date    CHOL 186 2024    TRIG 76 2024    HDL 51 2024     (H) 2024    VLDL 15 2024    CHOLHDLRATIO 5.0 2019           Jessa Massey, PharmD., BCACP  Population Health Pharmacist  [facility] Clinical Pharmacy  Department, toll free: [phone], option 1

## 2025-04-09 NOTE — TELEPHONE ENCOUNTER
Second follow-up attempt:  left voicemail requesting call back.    Patient returned call 2025 stating Atorvastatin was discontinued due to side effects.     Called to clarify most recent use of the Atorvastatin and when discontinued; also will see if patient tried/failed any other statins and if agreeable to trying alternative statin that has better tolerability and/or lower dose.       Jessa Massey, PharmD., [facility]  Population Health Pharmacist  [facility] Clinical Pharmacy  Department, toll free: [phone], option 1   =======================================================  For Pharmacy Admin Tracking Only    Program: [employer]  CPA in place:  No  Recommendation Provided To: Patient/Caregiver: 2 via Telephone  Intervention Detail: Adherence Monitorin  Intervention Accepted By: Patient/Caregiver: 1  Gap Closed?: No   Time Spent (min): 20

## 2025-04-10 NOTE — TELEPHONE ENCOUNTER
Patient's wife, Elvi, returned call with patient on phone as well.    Patient reports that he is actually still taking Atorvastatin 20mg 1 tablet once daily.  Patient had bottle in hand and confirmed accuracy and spelling of the drug to ensure we were discussing same medication product.  Patient denies side effects with the atorvastatin and is currently taking as prescribed.      Confirmed also patient is taking Lisinopril 10mg 1 tablet once daily.    Patient reports adequate supplies of both medications and declined to refill medication.    Rx Med Rec last fill date only through January claims retrievable.  Called [facility] and confirmed fill dates of 1/3/25 for both meds.  Pharmacy states all of his meds are refilled at same time and are on day 92 of 90 days currently.      Jessa Massey, PharmD., [facility]  Population Health Pharmacist  [facility] Clinical Pharmacy  Department, toll free: [phone], option 1    =======================================================  For Pharmacy Admin Tracking Only    Program: [employer]  CPA in place:  No  Recommendation Provided To: Patient/Caregiver: 2 via Telephone  Intervention Detail: Adherence Monitorin  Intervention Accepted By: Patient/Caregiver: 0  Gap Closed?: No   Time Spent (min): 30

## 2025-06-02 ENCOUNTER — TELEPHONE (OUTPATIENT)
Dept: PHARMACY | Facility: CLINIC | Age: 75
End: 2025-06-02

## 2025-06-02 NOTE — TELEPHONE ENCOUNTER
Patients wife Elvi returned call and stated that Vito is still currently taking LISINOPRIL TAB 10MG & ATORVASTATIN TAB 20MG. Elvi asked if I could call [facility] and get the medications ready for her to . [facility] was called and stated that they will get a 90 day supply of both medications ready.  will be 2025 in the afternoon.   Returned call back to Elvi to inform her that the RX's will be ready for  tomorrow 2025 afternoon. No answer, left message.     Gladys Arriaga MA  [facility] Clinical   [facility] Clinical Pharmacy  [phone] Option 1     For Pharmacy Admin Tracking Only    Program: [facility]  CPA in place:  No  Recommendation Provided To: Patient/Caregiver: 2 via Telephone and Pharmacy: 2  Intervention Detail: Adherence Monitorin  Intervention Accepted By: Patient/Caregiver: 2 and Pharmacy: 2  Gap Closed?: Yes   Time Spent (min): 15

## 2025-06-02 NOTE — TELEPHONE ENCOUNTER
[facility] CLINICAL PHARMACY: ADHERENCE REVIEW  Identified care gap per [employer]: fills at [facility]: ACE/ARB and Statin adherence    ASSESSMENT    ACE/ARB ADHERENCE    Insurance Records claims through 25 (Prior Year PDC = not reported; YTD PDC = 62%; Potential Fail Date: 25):   LISINOPRIL  TAB 10MG last filled on 25 for 90 day supply. Next refill due:     Prescribed si tablet/capsule daily    Per Insurer Portal: last filled on same    Per [facility]: last picked up on 25 for 90 day supply.    BP Readings from Last 3 Encounters:   25 130/78   24 132/82   24 128/82     Estimated Creatinine Clearance: 49 mL/min (based on SCr of 1.2 mg/dL).  Lab Results   Component Value Date    CREATININE 1.2 2024     Lab Results   Component Value Date    K 4.2 2024     STATIN ADHERENCE    Insurance Records claims through 25 (Prior Year PDC = not reported; YTD PDC = 62%; Potential Fail Date: 25):   ATORVASTATIN TAB 20MG last filled on 25 for 90 day supply. Next refill due: 25    Prescribed si tablet/capsule daily    Per Insurer Portal: last filled on same    Per [facility]: last picked up on 25 for 90 day supply.    Lab Results   Component Value Date    CHOL 186 2024    TRIG 76 2024    HDL 51 2024     Lab Results   Component Value Date     (H) 2024      ALT   Date Value Ref Range Status   2024 13 0 - 40 U/L Final     AST   Date Value Ref Range Status   2024 19 0 - 39 U/L Final     The 10-year ASCVD risk score (Ashley CALVILLO, et al., 2019) is: 26.6%    Values used to calculate the score:      Age: 75 years      Sex: Male      Is Non- : No      Diabetic: No      Tobacco smoker: No      Systolic Blood Pressure: 130 mmHg      Is BP treated: Yes      HDL Cholesterol: 51 mg/dL      Total Cholesterol: 186 mg/dL     The following are interventions

## 2025-07-23 DIAGNOSIS — E55.9 VITAMIN D INSUFFICIENCY: ICD-10-CM

## 2025-07-23 DIAGNOSIS — I10 PRIMARY HYPERTENSION: ICD-10-CM

## 2025-07-23 DIAGNOSIS — R73.01 IMPAIRED FASTING GLUCOSE: ICD-10-CM

## 2025-07-23 DIAGNOSIS — E03.9 ACQUIRED HYPOTHYROIDISM: ICD-10-CM

## 2025-07-23 DIAGNOSIS — N18.31 STAGE 3A CHRONIC KIDNEY DISEASE (HCC): ICD-10-CM

## 2025-07-23 DIAGNOSIS — E78.2 MIXED HYPERLIPIDEMIA: ICD-10-CM

## 2025-07-23 LAB
ALBUMIN: 4 G/DL (ref 3.5–5.2)
ALP BLD-CCNC: 73 U/L (ref 40–129)
ALT SERPL-CCNC: 12 U/L (ref 0–50)
ANION GAP SERPL CALCULATED.3IONS-SCNC: 10 MMOL/L (ref 7–16)
AST SERPL-CCNC: 24 U/L (ref 0–50)
BASOPHILS ABSOLUTE: 0.04 K/UL (ref 0–0.2)
BASOPHILS RELATIVE PERCENT: 1 % (ref 0–2)
BILIRUB SERPL-MCNC: 0.3 MG/DL (ref 0–1.2)
BILIRUBIN, URINE: NEGATIVE
BUN BLDV-MCNC: 16 MG/DL (ref 8–23)
CALCIUM SERPL-MCNC: 9.5 MG/DL (ref 8.8–10.2)
CHLORIDE BLD-SCNC: 106 MMOL/L (ref 98–107)
CHOLESTEROL, TOTAL: 219 MG/DL
CO2: 24 MMOL/L (ref 22–29)
COLOR, UA: YELLOW
CREAT SERPL-MCNC: 1.2 MG/DL (ref 0.7–1.2)
EOSINOPHILS ABSOLUTE: 0.16 K/UL (ref 0.05–0.5)
EOSINOPHILS RELATIVE PERCENT: 3 % (ref 0–6)
GFR, ESTIMATED: 67 ML/MIN/1.73M2
GLUCOSE BLD-MCNC: 107 MG/DL (ref 74–99)
GLUCOSE URINE: NEGATIVE MG/DL
HBA1C MFR BLD: 5.3 % (ref 4–5.6)
HCT VFR BLD CALC: 43.1 % (ref 37–54)
HDLC SERPL-MCNC: 58 MG/DL
HEMOGLOBIN: 13.7 G/DL (ref 12.5–16.5)
IMMATURE GRANULOCYTES %: 0 % (ref 0–5)
IMMATURE GRANULOCYTES ABSOLUTE: <0.03 K/UL (ref 0–0.58)
KETONES, URINE: NEGATIVE MG/DL
LDL CHOLESTEROL: 145 MG/DL
LEUKOCYTE ESTERASE, URINE: NEGATIVE
LYMPHOCYTES ABSOLUTE: 1.14 K/UL (ref 1.5–4)
LYMPHOCYTES RELATIVE PERCENT: 22 % (ref 20–42)
MCH RBC QN AUTO: 30.4 PG (ref 26–35)
MCHC RBC AUTO-ENTMCNC: 31.8 G/DL (ref 32–34.5)
MCV RBC AUTO: 95.8 FL (ref 80–99.9)
MONOCYTES ABSOLUTE: 0.58 K/UL (ref 0.1–0.95)
MONOCYTES RELATIVE PERCENT: 11 % (ref 2–12)
NEUTROPHILS ABSOLUTE: 3.2 K/UL (ref 1.8–7.3)
NEUTROPHILS RELATIVE PERCENT: 62 % (ref 43–80)
NITRITE, URINE: NEGATIVE
PDW BLD-RTO: 13.4 % (ref 11.5–15)
PH, URINE: 6 (ref 5–8)
PLATELET # BLD: 322 K/UL (ref 130–450)
PMV BLD AUTO: 11 FL (ref 7–12)
POTASSIUM SERPL-SCNC: 4.5 MMOL/L (ref 3.5–5.1)
PROTEIN UA: ABNORMAL MG/DL
RBC # BLD: 4.5 M/UL (ref 3.8–5.8)
RBC UA: NORMAL /HPF
SODIUM BLD-SCNC: 140 MMOL/L (ref 136–145)
SPECIFIC GRAVITY UA: 1.02 (ref 1–1.03)
T4 FREE: 0.9 NG/DL (ref 0.9–1.7)
TOTAL PROTEIN: 7 G/DL (ref 6.4–8.3)
TRIGL SERPL-MCNC: 80 MG/DL
TSH SERPL DL<=0.05 MIU/L-ACNC: 3.87 UIU/ML (ref 0.27–4.2)
TURBIDITY: CLEAR
URIC ACID: 6.4 MG/DL (ref 3.4–7)
URINE HGB: NEGATIVE
UROBILINOGEN, URINE: 0.2 EU/DL (ref 0–1)
VITAMIN D 25-HYDROXY: 42.3 NG/ML (ref 30–100)
VLDLC SERPL CALC-MCNC: 16 MG/DL
WBC # BLD: 5.1 K/UL (ref 4.5–11.5)
WBC UA: NORMAL /HPF

## 2025-08-29 ENCOUNTER — TELEPHONE (OUTPATIENT)
Dept: PHARMACY | Facility: CLINIC | Age: 75
End: 2025-08-29

## 2025-08-29 DIAGNOSIS — I10 PRIMARY HYPERTENSION: ICD-10-CM

## 2025-08-29 DIAGNOSIS — E78.2 MIXED HYPERLIPIDEMIA: ICD-10-CM

## 2025-08-29 RX ORDER — ATORVASTATIN CALCIUM 20 MG/1
20 TABLET, FILM COATED ORAL DAILY
Qty: 90 TABLET | Refills: 1 | Status: SHIPPED | OUTPATIENT
Start: 2025-08-29

## 2025-08-29 RX ORDER — LISINOPRIL 10 MG/1
10 TABLET ORAL DAILY
Qty: 90 TABLET | Refills: 1 | Status: SHIPPED | OUTPATIENT
Start: 2025-08-29

## (undated) DEVICE — SYRINGE MED 30ML STD CLR PLAS LUERLOCK TIP N CTRL DISP

## (undated) DEVICE — CHLORAPREP 26ML ORANGE

## (undated) DEVICE — STANDARD HYPODERMIC NEEDLE,POLYPROPYLENE HUB: Brand: MONOJECT

## (undated) DEVICE — PIN BNE FIX TEMP L140MM DIA4MM MAKO

## (undated) DEVICE — DRESSING FOAM ADH POLYUR W/ SIL WND SHT 4IN LEN 4IN W

## (undated) DEVICE — TRAY TRIATHLON SIZE 3-6FEM/TIB PREP REUSABLE

## (undated) DEVICE — STRYKER PERFORMANCE SERIES SAGITTAL BLADE: Brand: STRYKER PERFORMANCE SERIES

## (undated) DEVICE — CORD RETRCT SIL

## (undated) DEVICE — 3M™ IOBAN™ 2 ANTIMICROBIAL INCISE DRAPE 6651EZ: Brand: IOBAN™ 2

## (undated) DEVICE — ELECTRODE PT RET AD L9FT HI MOIST COND ADH HYDRGEL CORDED

## (undated) DEVICE — DRAPE,TOP,102X53,STERILE: Brand: MEDLINE

## (undated) DEVICE — SOLUTION IV IRRIG WATER 1000ML POUR BRL 2F7114

## (undated) DEVICE — BASIC SINGLE BASIN 1-LF: Brand: MEDLINE INDUSTRIES, INC.

## (undated) DEVICE — BOOT POS LEG DEMAYO

## (undated) DEVICE — COVER HNDL LT DISP

## (undated) DEVICE — GOWN,SIRUS,NONRNF,SETINSLV,XL,20/CS: Brand: MEDLINE

## (undated) DEVICE — GLOVE SURG SZ 8 CRM LTX FREE POLYISOPRENE POLYMER BEAD ANTI

## (undated) DEVICE — TRAY TRIATLON PATELLA PREP AND TRIAL REUSABLE

## (undated) DEVICE — TRAY TRIATHLON MISC INST REUSABLE

## (undated) DEVICE — STERILE PVP: Brand: MEDLINE INDUSTRIES, INC.

## (undated) DEVICE — SPONGE LAP W18XL18IN WHT COT 4 PLY FLD STRUNG RADPQ DISP ST

## (undated) DEVICE — Z DISCONTINUED USE 2275686 GLOVE SURG SZ 8 L12IN FNGR THK13MIL WHT ISOLEX POLYISOPRENE

## (undated) DEVICE — TRAY SYSTEM 7 DRILL REUSABLE

## (undated) DEVICE — PIN BNE FIX TEMP L110MM DIA4MM MAKO

## (undated) DEVICE — TUBE IRRIG HNDPC HI FLO TP INTRPULS W/SUCTION TUBE

## (undated) DEVICE — BANDAGE COMPR W6INXL12FT SMOOTH FOR LIMB EXSANG ESMARCH

## (undated) DEVICE — KIT TRK KNEE PROC VIZADISC

## (undated) DEVICE — TRAY TRIATHLON SIZE 3-6CR FEM/TIB TRIAL REUSABLE

## (undated) DEVICE — INSTRUMENT GRADUATE REUSABLE

## (undated) DEVICE — PACK PROCEDURE SURG GEN CUST

## (undated) DEVICE — SUTURE STRATAFIX SYMMETRIC SZ 1 L18IN ABSRB VLT CT1 L36CM SXPP1A404

## (undated) DEVICE — TOWEL,OR,DSP,ST,BLUE,STD,6/PK,12PK/CS: Brand: MEDLINE

## (undated) DEVICE — TRAY STRYKER MAKO LEG POSITIONER INSTR

## (undated) DEVICE — BLADE CLIPPER GEN PURP NS

## (undated) DEVICE — Z INACTIVE USE 2660664 SOLUTION IRRIG 3000ML 0.9% SOD CHL USP UROMATIC PLAS CONT

## (undated) DEVICE — KIT INT FIX FEM TIB CKPT MAKOPLASTY

## (undated) DEVICE — 3M™ STERI-DRAPE™ U-DRAPE 1015: Brand: STERI-DRAPE™

## (undated) DEVICE — INSTRUMENT ANGLED CURRETTES REUSABLE

## (undated) DEVICE — DRAPE,REIN 53X77,STERILE: Brand: MEDLINE

## (undated) DEVICE — Z DISCONTINUED USE 2744636  DRESSING AQUACEL 14 IN ALG W3.5XL14IN POLYUR FLM CVR W/ HYDRCOLL

## (undated) DEVICE — GLOVE SURG SZ 8 L12IN FNGR THK94MIL STD WHT LTX FREE

## (undated) DEVICE — 4-PORT MANIFOLD: Brand: NEPTUNE 2

## (undated) DEVICE — KIT DRP FOR RIO ROBOTIC ARM ASST SYS

## (undated) DEVICE — TAPE ADH W3INXL10YD WHT COT WVN BK POWERFUL RUB BASE HIGHLY

## (undated) DEVICE — DOUBLE BASIN SET: Brand: MEDLINE INDUSTRIES, INC.

## (undated) DEVICE — ADHESIVE SKIN CLSR 0.7ML TOP DERMBND ADV

## (undated) DEVICE — ZIMMER® STERILE DISPOSABLE TOURNIQUET CUFF WITH PLC, DUAL PORT, SINGLE BLADDER, 30 IN. (76 CM)

## (undated) DEVICE — 3 BONE CEMENT MIXER: Brand: MIXEVAC

## (undated) DEVICE — BNDG,ELSTC,MATRIX,STRL,6"X5YD,LF,HOOK&LP: Brand: MEDLINE

## (undated) DEVICE — 3M™ COBAN™ NL STERILE NON-LATEX SELF-ADHERENT WRAP, 2084S, 4 IN X 5 YD (10 CM X 4,5 M), 18 ROLLS/CASE: Brand: 3M™ COBAN™

## (undated) DEVICE — TRAY STRYKER MAKO TOTAL KNEE POWER

## (undated) DEVICE — SET ORTHO STD STORTSTD1

## (undated) DEVICE — DECANTER: Brand: UNBRANDED

## (undated) DEVICE — TRAY STRYKER MAKO TOTAL KNEE ARRAY

## (undated) DEVICE — TOTAL KNEE PK

## (undated) DEVICE — SOLUTION IV 500ML 0.9% SOD CHL PH 5 INJ USP VIAFLX PLAS

## (undated) DEVICE — TRAY LAMBOTS REUSABLE

## (undated) DEVICE — TUBING, SUCTION, 9/32" X 10', STRAIGHT: Brand: MEDLINE

## (undated) DEVICE — INSTRUMENT SYSTEM 7 BATTERY REUSABLE